# Patient Record
Sex: FEMALE | Race: WHITE | NOT HISPANIC OR LATINO | Employment: FULL TIME | URBAN - METROPOLITAN AREA
[De-identification: names, ages, dates, MRNs, and addresses within clinical notes are randomized per-mention and may not be internally consistent; named-entity substitution may affect disease eponyms.]

---

## 2017-11-30 ENCOUNTER — OFFICE VISIT (OUTPATIENT)
Dept: OBSTETRICS AND GYNECOLOGY | Facility: CLINIC | Age: 27
End: 2017-11-30

## 2017-11-30 VITALS
HEIGHT: 67 IN | BODY MASS INDEX: 35.31 KG/M2 | WEIGHT: 225 LBS | DIASTOLIC BLOOD PRESSURE: 98 MMHG | HEART RATE: 91 BPM | SYSTOLIC BLOOD PRESSURE: 142 MMHG

## 2017-11-30 DIAGNOSIS — Z01.419 WELL WOMAN EXAM WITH ROUTINE GYNECOLOGICAL EXAM: Primary | ICD-10-CM

## 2017-11-30 PROCEDURE — 99395 PREV VISIT EST AGE 18-39: CPT | Performed by: OBSTETRICS & GYNECOLOGY

## 2017-11-30 RX ORDER — MULTIVIT WITH MINERALS/LUTEIN
1000 TABLET ORAL DAILY
COMMUNITY

## 2017-11-30 RX ORDER — VALSARTAN 160 MG/1
TABLET ORAL
Refills: 1 | COMMUNITY
Start: 2017-09-14 | End: 2018-03-29

## 2017-11-30 RX ORDER — ERGOCALCIFEROL 1.25 MG/1
CAPSULE ORAL
Refills: 0 | COMMUNITY
Start: 2017-09-14 | End: 2018-05-31 | Stop reason: SDUPTHER

## 2017-11-30 RX ORDER — LANOLIN ALCOHOL/MO/W.PET/CERES
1000 CREAM (GRAM) TOPICAL DAILY
COMMUNITY

## 2017-11-30 NOTE — PROGRESS NOTES
Subjective   Callie Parks is a 27 y.o. female here for annual exam. Last Pap - 2014  History of Present Illness  Patient has Nexplanon and left upper extremity that was placed 3 years ago in February 2018.  She is ready to start pursuing fertility.  She has been treated for hypertension by her primary care provider.  Currently she is on valsartan.  She is aware that this is not ideal for her pregnancy.  We discussed that she would need to change her hypertensive therapy and get it under good control prior to pursuing fertility.  She sees her primary care provider in mid December.    Denies a history of abnormal Pap smears.  She is sexually active with her .  She previously lived in [place] and moved back to [place] recently where her family lives.  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  History reviewed. No pertinent past medical history.  History reviewed. No pertinent surgical history.  Social History   Substance Use Topics   • Smoking status: Never Smoker   • Smokeless tobacco: Never Used   • Alcohol use No     History reviewed. No pertinent family history.      Review of Systems   Constitutional: Negative for chills, fever and unexpected weight change.   HENT: Negative for congestion, nosebleeds and sore throat.    Eyes: Negative for visual disturbance.   Respiratory: Negative for cough, chest tightness and shortness of breath.    Cardiovascular: Negative for chest pain and palpitations.   Gastrointestinal: Negative for abdominal pain, constipation, diarrhea, nausea and vomiting.   Endocrine: Negative for polyuria.   Genitourinary: Negative for difficulty urinating, dyspareunia, dysuria, frequency, genital sores, hematuria, menstrual problem, pelvic pain, urgency, vaginal bleeding, vaginal discharge and vaginal pain.   Musculoskeletal: Negative for arthralgias and joint swelling.   Skin: Negative for color change and rash.   Neurological: Negative for dizziness, light-headedness and  "headaches.   Hematological: Does not bruise/bleed easily.   Psychiatric/Behavioral: Negative for dysphoric mood. The patient is not nervous/anxious.        Objective    /98  Pulse 91  Ht 67\" (170.2 cm)  Wt 225 lb (102 kg)  BMI 35.24 kg/m2   Physical Exam   Constitutional: She is oriented to person, place, and time. She appears well-developed and well-nourished. No distress.   HENT:   Head: Normocephalic and atraumatic.   Neck: No tracheal deviation present. No thyromegaly present.   Cardiovascular: Normal rate, regular rhythm and normal heart sounds.  Exam reveals no gallop and no friction rub.    No murmur heard.  Pulmonary/Chest: Effort normal and breath sounds normal. No respiratory distress. She has no wheezes. She has no rales. She exhibits no tenderness. Right breast exhibits no inverted nipple, no mass, no nipple discharge, no skin change and no tenderness. Left breast exhibits no inverted nipple, no mass, no nipple discharge, no skin change and no tenderness.   Abdominal: Soft. She exhibits no distension and no mass. There is no tenderness.   Genitourinary: Uterus normal. No labial fusion. There is no rash, lesion or injury on the right labia. There is no rash, lesion or injury on the left labia. Uterus is not deviated, not enlarged, not fixed and not tender. Cervix exhibits no motion tenderness, no discharge and no friability. Right adnexum displays no mass, no tenderness and no fullness. Left adnexum displays no mass, no tenderness and no fullness. No tenderness or bleeding in the vagina. No vaginal discharge found.   Musculoskeletal: Normal range of motion. She exhibits no edema or deformity.   Lymphadenopathy:     She has no cervical adenopathy.   Neurological: She is alert and oriented to person, place, and time.   Skin: Skin is warm and dry. No rash noted. She is not diaphoretic.   nexplanon palpable in left upper extremity   Psychiatric: She has a normal mood and affect. Her behavior is " normal. Judgment and thought content normal.         Assessment/Plan   Problems Addressed this Visit     None      Visit Diagnoses     Well woman exam with routine gynecological exam    -  Primary          Well woman counseling/screening:    Cervical cancer screening:    Reports no h/o cervical dysplasia   HPV vaccination recommended.   The patient is due for a pap today .    Screening guidelines discussed with patient  Breast cancer screening:    Discussed risks and benefits of earlier versus later screening   Clinical breast exam recommended for age 20-39 years every 1-3 years   Mammogram recommended starting age 40   Breast self awareness encouraged  STD Screening   Declined  Contraception :   Nexplanon in place, due out in Feb 2018   Patient was advised to change BP medication and ensure adequate control prior to getting Nexplanon removed. She is agreeable to this and plans to meet with PCP in mid December.  Acceptable blood pressure medications in pregnancy include Procardia XL and labetalol.  Patient was recommended to start taking a prenatal vitamin.  She was counseled on the removal procedure, risks and benefits.  We also discussed that in pregnancy some hypertension does improve and some patients may come off medication but she is at higher risk of preeclampsia and other pregnancy complications given hypertension and weight  Family history    does not demonstrate need for genetics referral   Healthy lifestyle counseling:   Patient was counseled on BMI of 35.  We discussed the risks including the risk to her fertility and her pregnancy.  I recommended that she start trying to lose weight.  When she does become pregnant if her BMI is over 30 we would recommend minimal to no weight gain healthy balanced diet.

## 2017-12-01 ENCOUNTER — DOCUMENTATION (OUTPATIENT)
Dept: OBSTETRICS AND GYNECOLOGY | Facility: CLINIC | Age: 27
End: 2017-12-01

## 2017-12-01 ENCOUNTER — TELEPHONE (OUTPATIENT)
Dept: OBSTETRICS AND GYNECOLOGY | Facility: CLINIC | Age: 27
End: 2017-12-01

## 2017-12-06 ENCOUNTER — TELEPHONE (OUTPATIENT)
Dept: OBSTETRICS AND GYNECOLOGY | Facility: CLINIC | Age: 27
End: 2017-12-06

## 2017-12-06 LAB
CONV .: NORMAL
CYTOLOGIST CVX/VAG CYTO: NORMAL
CYTOLOGY CVX/VAG DOC THIN PREP: NORMAL
DX ICD CODE: NORMAL
HIV 1 & 2 AB SER-IMP: NORMAL
OTHER STN SPEC: NORMAL
PATH REPORT.FINAL DX SPEC: NORMAL
STAT OF ADQ CVX/VAG CYTO-IMP: NORMAL

## 2017-12-06 NOTE — TELEPHONE ENCOUNTER
----- Message from Felicia Boswell MD sent at 12/6/2017  1:20 PM EST -----  Please notify patient of normal pap smear. Thanks!

## 2018-03-29 ENCOUNTER — PROCEDURE VISIT (OUTPATIENT)
Dept: OBSTETRICS AND GYNECOLOGY | Facility: CLINIC | Age: 28
End: 2018-03-29

## 2018-03-29 VITALS
HEART RATE: 70 BPM | WEIGHT: 216 LBS | HEIGHT: 67 IN | BODY MASS INDEX: 33.9 KG/M2 | SYSTOLIC BLOOD PRESSURE: 135 MMHG | DIASTOLIC BLOOD PRESSURE: 99 MMHG

## 2018-03-29 DIAGNOSIS — Z30.09 ENCOUNTER FOR OTHER GENERAL COUNSELING OR ADVICE ON CONTRACEPTION: Primary | ICD-10-CM

## 2018-03-29 PROCEDURE — 11982 REMOVE DRUG IMPLANT DEVICE: CPT | Performed by: OBSTETRICS & GYNECOLOGY

## 2018-03-29 RX ORDER — PRENATAL VIT NO.126/IRON/FOLIC 28MG-0.8MG
1 TABLET ORAL DAILY
COMMUNITY
End: 2018-12-19 | Stop reason: HOSPADM

## 2018-03-29 RX ORDER — NIFEDIPINE 60 MG/1
60 TABLET, FILM COATED, EXTENDED RELEASE ORAL DAILY
Refills: 5 | COMMUNITY
Start: 2018-03-22 | End: 2018-10-12 | Stop reason: SDUPTHER

## 2018-03-29 NOTE — PROGRESS NOTES
Procedure Note     Pre-Operative Diagnosis: 1. Desire for removal of Nexplanon device from left arm    Post-Operative Diagnosis: Same     Procedure: Nexplanon removal    Anesthetic: 3mL 1% plain Xylocaine     Estimated Blood Loss: Nil     Complications: no complications were noted     Counseling: Patient was seen and counseled.  Patient desires removal of the implant device because she desires conception.  She was counseled on other contraceptive options.  She was counseled on prenatal vitamin use if no contraceptive option chosen and healthy timing/spacing of pregnancy. She expressed understanding. She has already switched to an antihypertensive that is safe in pregnancy. The risks of removal were reviewed including bleeding, infection, damage to surrounding structures, and scarring.    Description of Procedure:   Patient was consented for procedure; questions were answered. The implant was identified in her left upper extremity . The area of the distal implant was cleaned with alcohol and injected with 3mL of 1% lidocaine.  The skin was prepped with betadine and incised approximately 0.5cm parallel to the plane of the implant.  The implant was identified and grasped with a hemostat.  The overlying capsule was dissected off with a hemostat, and the implant was removed in its entirety.  A single interrupted stitch of 4-0 monocryl was placed in the skin.  The incision was covered with Steri strips.  Patient tolerated the procedure well.     Felicia Boswell MD

## 2018-05-07 ENCOUNTER — INITIAL PRENATAL (OUTPATIENT)
Dept: OBSTETRICS AND GYNECOLOGY | Facility: CLINIC | Age: 28
End: 2018-05-07

## 2018-05-07 VITALS — DIASTOLIC BLOOD PRESSURE: 97 MMHG | BODY MASS INDEX: 33.2 KG/M2 | WEIGHT: 212 LBS | SYSTOLIC BLOOD PRESSURE: 141 MMHG

## 2018-05-07 DIAGNOSIS — Z34.01 ENCOUNTER FOR SUPERVISION OF NORMAL FIRST PREGNANCY IN FIRST TRIMESTER: Primary | ICD-10-CM

## 2018-05-07 PROBLEM — O10.919 CHRONIC HYPERTENSION AFFECTING PREGNANCY: Status: ACTIVE | Noted: 2018-05-07

## 2018-05-07 PROBLEM — E03.8 SUBCLINICAL HYPOTHYROIDISM: Status: ACTIVE | Noted: 2018-05-07

## 2018-05-07 PROBLEM — O09.90 HIGH-RISK PREGNANCY: Status: ACTIVE | Noted: 2018-05-07

## 2018-05-07 PROCEDURE — 0501F PRENATAL FLOW SHEET: CPT | Performed by: OBSTETRICS & GYNECOLOGY

## 2018-05-07 NOTE — PATIENT INSTRUCTIONS
"Nausea/vomiting in pregnancy:  To help with nausea and vomiting you may try the following over the counter products:  Nola chews, nola tea, nola gum  Mint tea, peppermint gum or candy  B6/Doxylamine: to be taken daily, not just when symptoms occur  Pyridoxine (also known as B6): 10 to 25 mg orally every six to eight hours; the maximum treatment dose suggested for pregnant women is 200 mg/day.  Doxylamine (available in some over-the-counter sleeping pills (eg, Unisom Sleep Tabs) and as an antihistamine chewable tablet (Aldex AN)). One-half of the 25 mg over-the-counter tablet or two chewable 5 mg tablets can be used off-label as an antiemetic  · The Association of Professors of Gynecology and Obstetrics has released a smart phone application that can help you monitor and manage your symptoms.  The Application is \"Managing NVP,\" and has a green icon that says \"APGO WELLMOM.\"    If these do not work, we may need to try prescription medications.    Please contact us if you are unable to tolerate liquids (even sips of water) for over six to eight hours, have weight loss of over 10% of your body weight, blood in vomit, fever (temp of 100.4 or higher), or other concerning symptoms arise.    Travel During Pregnancy:  • Always use seatbelts.  A lap belt should be worn below the abdomen (across the hips) and the shoulder belt should be worn across the center of your chest (between the breasts) away from your neck.  Do not put the shoulder belt under your arm or behind your back.  Pull any slack out of the belt.  • Air travel is safe in most uncomplicated pregnancies, but we do not recommend air travel past 36 weeks.  Airlines may also have restrictions, so check with your airline before flying.  For some international flights, the travel cut off may be as early as 28 weeks gestation, and some airlines may require letters from your physician.  • When going on long trips in car, plane, train, or bus, frequent ambulation " is important to prevent blood clots in legs and/or lungs.  The following may help with prevention of blood clots in legs: Drink lots of fluid, wear loose-fitting clothing, walk and stretch at regular intervals.    • Avoid areas with Zika outbreaks.  For the latest information, you may visit: www.cdc.gov/travel/notices/.  Resources: , , Committee Opinion 455  Nutrition during pregnancy  • Average weight gain during pregnancy is based on your pre-pregnancy body mass index (BMI).  See below for recommended weight gain:  o Underweight (BMI <18.5), we recommend 28 to 40lb weight gain  o Normal weight (BMI 18.5 to 24.9), we recommend a 25 to 35lb weight gain  o Overweight (BMI 25-29.9), we recommend a 15 to 25lb weight gain  o Obese (BMI >30), we recommend keeping weight gain under 20 lbs.    • You should speak with your physician regarding your specific weight goals for this pregnancy.   • Foods to avoid include:   o Fish: avoid certain types of fish such as shark, swordfish, amrita mackerel, tilefish.  Limit white (albacore) tuna to 6 oz per week.  Choose fish and shellfish such as shrimp, salmon, catfish, [place].  o Food-borne illness: Pregnant women are much more likely to get Listeriosis than non-pregnant women.  To help prevent this, avoid eating unpasteurized milk and unpasteurized milk products, hot dogs/lunch meat/cold cuts unless they are heated until steaming right before serving, refrigerated meat spreads, refrigerated smoked seafood, raw or undercooked seafood/eggs/meat.   • Vitamin D helps development of the baby’s bones and teeth.  Good sources of Vitamin D include milk fortified with Vitamin D and fatty fish such as salmon.    • Folic acid, also known as folate, helps develop the baby’s brain and spine.  You should make sure your vitamin contains extra folic acid - at least 400mcg.    • Iron helps make red blood cells.  You need to make extra red blood cell sin pregnancy.  We recommend eating  Iron-rich foods such as lean red meat, poultry, fish, dried beans and peas, iron-fortified cereals, and prune juice.  You may be recommended an iron supplement.  If so, it is absorbed more easily if taken with vitamin C-rich foods such as citrus fruits or tomatoes.    • It is important to eat a well balanced diet.  A good recourse for nutrition recommendations is: www.choosemyplate.gov.  • Limit caffeine intake to 200mg daily.  Some coffees/teas/sodas have very different levels of caffeine per serving, so check the nutrition labeling.   Resources: , Committee Opinion 548  Exercise during pregnancy  • If you are healthy and your pregnancy is normal, it is safe to continue or start most types of exercise.   Physical activity does not increase your risk of miscarriage, low birth weight or early delivery, but you should discuss specific limitations or any complications with your physician.    • Benefits of exercise during pregnancy include: reduced back pain, less constipation, promotes overall health and healthy weight gain which may decrease risks for certain pregnancy complications such as diabetes and/or preeclampsia.  • The CDC recommends 150 minutes of moderate intensity aerobic exercise per week.  Moderate intensity means that you are moving enough to raise your heart rate and start sweating, but you can talk normally.  Brisk walking, swimming/water workouts, modified yoga/pilates, and use of elliptical machines and/or stationary bikes are examples of aerobic activity.    • Precautions:  o Stay hydrated.  Drink plenty of water before, during and after your workout  o Wear supportive clothing such as a sports bra  o Do not become overheated.  Do not exercise outside when it is very hot or humid  o Avoid lying flat on your back as much as possible  o AVOID contact sports, skydiving, sports that risk falling (such as skiing, surfing, off-road cycling, gymnastics, horseback riding), hot yoga/hot pilates, scuba  diving  • Stop exercising if you experience: bleeding from the vagina, feeling dizzy/faint, shortness of breath before starting exercise, chest pain, headache, muscle weakness, calf pain or swelling, regular uterine contractions, fluid leaking from the vagina.    • Postpartum exercise: Continuing exercise after you deliver your baby will help boost your energy, strengthen muscles, promote better sleep, and relieve stress.  It also may be useful in preventing postpartum depression.  150 minutes of moderate-intensity aerobic activity is recommended.  Types of exercise and when you can start a regular exercise routine may be limited by the type of delivery you had.  Please discuss with your physician prior to resuming or starting exercise.    Resources: ,   Back pain during pregnancy  • Back pain is very common during pregnancy.  It may arise due to strain on your back muscles, weakness of the abdominal muscles, and pregnancy hormones.    • To prevent back pain:  o Wear shoes with good support. Flat shoes and high heels may not have good arch support.  o Consider a firm mattress  o Use good lifting practices.  Do not bend from the waist to pick things up.  Squat down and bend your knees, keeping a straight back.  o Sit in chairs with good back support or use a small pillow behind your lower back.    o Sleep on your side with one or two pillows between your legs  • To ease back pain:   o Exercise can help stretch strained muscles and strengthen weak muscles to promote good posture  • Contact your health care provider with severe pain, pain that persists for more than 2 weeks, fever, burning during urination, or vaginal bleeding.  Resources:

## 2018-05-07 NOTE — PROGRESS NOTES
Initial OB Visit    CC- Missed Menses    Callie Parks is being seen today for her first obstetrical visit.  She is a 27 y.o.    5w4d gestation.   FOB: Rigo Parks  This is a planned pregnancy.   Denies spotting, bleeding, cramping  #: 1, Date: None, Sex: None, Weight: None, GA: None, Delivery: None, Apgar1: None, Apgar5: None, Living: None, Birth Comments: None      Current obstetric complaints: mild nausea, no vomiting   Prior obstetric issues, potential pregnancy concerns: None  Family history of genetic issues (includes FOB):  FOB's brother had gastroschisis and passed away 2-3 months after birth.    Prior infections concerning in pregnancy (Rash, fever since LMP): none  Varicella Hx - reports vaccination   Prior testing for Cystic Fibrosis Carrier or Sickle Cell Trait- none  History of abnormal pap smears: none  History of STIs: no  Prepregnancy BMI - Body mass index is 33.2 kg/m².    Past Medical History:   Diagnosis Date   • Hypertension        Past Surgical History:   Procedure Laterality Date   • ADENOIDECTOMY     • WISDOM TOOTH EXTRACTION         Current Outpatient Prescriptions:   •  NIFEdipine CC (ADALAT CC) 60 MG 24 hr tablet, Take 60 mg by mouth Daily., Disp: , Rfl: 5  •  Prenatal Vit-Fe Fumarate-FA (PRENATAL, CLASSIC, VITAMIN) 28-0.8 MG tablet tablet, Take 1 tablet by mouth Daily., Disp: , Rfl:   •  vitamin B-12 (CYANOCOBALAMIN) 1000 MCG tablet, Take 1,000 mcg by mouth Daily., Disp: , Rfl:   •  vitamin D (ERGOCALCIFEROL) 19090 units capsule capsule, TK ONE C PO  Q 7 DAYS UNTIL GONE, Disp: , Rfl: 0  •  vitamin E 1000 UNIT capsule, Take 1,000 Units by mouth Daily., Disp: , Rfl:     No Known Allergies    Social History     Social History   • Marital status:      Spouse name: N/A   • Number of children: N/A   • Years of education: N/A     Occupational History   • Not on file.     Social History Main Topics   • Smoking status: Never Smoker   • Smokeless tobacco: Never Used   • Alcohol  use No   • Drug use: No   • Sexual activity: Yes     Partners: Male     Birth control/ protection: None     Other Topics Concern   • Not on file     Social History Narrative   • No narrative on file       Family History   Problem Relation Age of Onset   • Breast cancer Neg Hx    • Ovarian cancer Neg Hx    • Uterine cancer Neg Hx    • Colon cancer Neg Hx        Review of systems     Constitutional: negative for chills, fevers and for fatigue  Eyes: negative  Ears, nose, mouth, throat, and face: negative for hearing loss and nasal congestion  Respiratory: negative for asthma and wheezing  Cardiovascular: negative for chest pain and dyspnea  Gastrointestinal: negative for dyspepsia, dysphagia abdominal pain  Genitourinary:negative for urinary incontinence  Integument/breast: negative for breast lump  Hematologic/lymphatic: negative for bleeding  Musculoskeletal:negative for aches  Neurological: negative for numbness/tingling  Behavioral/Psych: negative for anhedonia  Allergic/Immunologic: negative for rash, allergy    Objective    /97   Wt 96.2 kg (212 lb)   LMP 03/29/2018 (Exact Date)   BMI 33.20 kg/m²       General Appearance:    Alert, cooperative, in no acute distress, habitus obese   Head:    Normocephalic, without obvious abnormality, atraumatic   Eyes:            Lids and lashes normal, conjunctivae and sclerae normal, no   icterus, no pallor, corneas clear   Ears:    Ears appear intact with no abnormalities noted       Neck:   No adenopathy, supple, trachea midline, no thyromegaly   Back:     No kyphosis present, no scoliosis present,                       Lungs:     Clear to auscultation,respirations regular, even and                   unlabored    Heart:    Regular rhythm and normal rate, normal S1 and S2, no            murmur, no gallop, no rub, no click   Breast Exam:    No masses, No nipple discharge   Abdomen:     Normal bowel sounds, no masses, no organomegaly, soft        non-tender,  "non-distended, no guarding, no rebound                 tenderness   Genitalia:    Vulva - BUS-WNL, NEFG    Vagina - No discharge, No bleeding    Cervix - No Lesions, closed     Uterus - Consistent with 5 weeks    Adnexa - No bill, NT    Pelvimetry - clinically adequate, gynecoid pelvis     Extremities:   Moves all extremities well, no edema, no cyanosis, no              redness   Pulses:   Pulses palpable and equal bilaterally   Skin:   No bleeding, bruising or rash   Lymph nodes:   No palpable adenopathy   Neurologic:   Sensation intact, A&O times 3      Assessment  Pregnancy at 5w4d   Chronic hypertension, on medication   \"Borderline\" hypothyroid, no medication  Obesity     Plan    Initial labs drawn, GC/CHL screen done  Patient is on Prenatal vitamins  Problem list reviewed and updated.  Reviewed routine prenatal care with the office to include but not limited to:   Zika (travel restrictions/ok to use insect repellant), not to changing cat litter, food restrictions, avoidance of alcohol, tobacco and drugs and saunas/hot tubs, anticipated weight gain/nutrition requirements.  Reviewed nature of practice and hospital.  Reviewed recommended follow up, importance of compliance with care. We reviewed testing in pregnancy including HIV testing and urine drug screen.    Reviewed aneuploidy screening and CF screening - declines at this time. Was given [employer] pamphlet and discussed limitations of testing.  She is on Keto diet which has uncertain safety in pregnancy. We reviewed recommendation for 15lb weight gain in pregnancy given BMI.  Reviewed healthy, balanced diet.  On Procardia, discussed use of ASA for preeclampsia prevention.  Will review further at next visit. Baseline labs today.   Counseled on limitations of ultrasound in pregnancy.  All questions answered.       Patient Active Problem List    Diagnosis Date Noted   • High-risk pregnancy 05/07/2018     Note Last Updated: 5/7/2018     Dated by LMP  [ ] first " trim labs [ ] 1 hour GCT  [x] Pap NIL Dec 2017  [ ] GC/CT  [x] flu [ ] tdap  Aneuploidy discussed, **  CF discussed,  **  AFP discussed, **  Breastfeeding encouraged         • Chronic hypertension affecting pregnancy 05/07/2018     Note Last Updated: 5/7/2018     On procardia  Baseline labs  ASA?     • Subclinical hypothyroidism 05/07/2018     Note Last Updated: 5/7/2018     Per PCP  TSH at first visit         Felicia Boswell MD

## 2018-05-08 ENCOUNTER — TELEPHONE (OUTPATIENT)
Dept: OBSTETRICS AND GYNECOLOGY | Facility: CLINIC | Age: 28
End: 2018-05-08

## 2018-05-08 PROBLEM — R74.01 TRANSAMINITIS: Status: ACTIVE | Noted: 2018-05-08

## 2018-05-08 LAB
ABO GROUP BLD: (no result)
ALBUMIN SERPL-MCNC: 4.7 G/DL (ref 3.5–5.5)
ALBUMIN/GLOB SERPL: 2 {RATIO} (ref 1.2–2.2)
ALP SERPL-CCNC: 77 IU/L (ref 39–117)
ALT SERPL-CCNC: 81 IU/L (ref 0–32)
AST SERPL-CCNC: 45 IU/L (ref 0–40)
BASOPHILS # BLD AUTO: 0 X10E3/UL (ref 0–0.2)
BASOPHILS NFR BLD AUTO: 0 %
BILIRUB SERPL-MCNC: 0.3 MG/DL (ref 0–1.2)
BLD GP AB SCN SERPL QL: NEGATIVE
BUN SERPL-MCNC: 11 MG/DL (ref 6–20)
BUN/CREAT SERPL: 18 (ref 9–23)
CALCIUM SERPL-MCNC: 9.5 MG/DL (ref 8.7–10.2)
CHLORIDE SERPL-SCNC: 101 MMOL/L (ref 96–106)
CO2 SERPL-SCNC: 21 MMOL/L (ref 18–29)
CREAT SERPL-MCNC: 0.61 MG/DL (ref 0.57–1)
CREAT UR-MCNC: 102.7 MG/DL
EOSINOPHIL # BLD AUTO: 0 X10E3/UL (ref 0–0.4)
EOSINOPHIL NFR BLD AUTO: 0 %
ERYTHROCYTE [DISTWIDTH] IN BLOOD BY AUTOMATED COUNT: 13.8 % (ref 12.3–15.4)
GFR SERPLBLD CREATININE-BSD FMLA CKD-EPI: 126 ML/MIN/1.73
GFR SERPLBLD CREATININE-BSD FMLA CKD-EPI: 146 ML/MIN/1.73
GLOBULIN SER CALC-MCNC: 2.3 G/DL (ref 1.5–4.5)
GLUCOSE SERPL-MCNC: 90 MG/DL (ref 65–99)
HBA1C MFR BLD: 5.3 % (ref 4.8–5.6)
HBV SURFACE AG SERPL QL IA: NEGATIVE
HCT VFR BLD AUTO: 41.8 % (ref 34–46.6)
HGB BLD-MCNC: 14.2 G/DL (ref 11.1–15.9)
HIV 1+2 AB+HIV1 P24 AG SERPL QL IA: NON REACTIVE
IMM GRANULOCYTES # BLD: 0 X10E3/UL (ref 0–0.1)
IMM GRANULOCYTES NFR BLD: 0 %
LYMPHOCYTES # BLD AUTO: 2.4 X10E3/UL (ref 0.7–3.1)
LYMPHOCYTES NFR BLD AUTO: 26 %
MCH RBC QN AUTO: 30.5 PG (ref 26.6–33)
MCHC RBC AUTO-ENTMCNC: 34 G/DL (ref 31.5–35.7)
MCV RBC AUTO: 90 FL (ref 79–97)
MONOCYTES # BLD AUTO: 0.5 X10E3/UL (ref 0.1–0.9)
MONOCYTES NFR BLD AUTO: 6 %
NEUTROPHILS # BLD AUTO: 6.3 X10E3/UL (ref 1.4–7)
NEUTROPHILS NFR BLD AUTO: 68 %
PLATELET # BLD AUTO: 244 X10E3/UL (ref 150–379)
POTASSIUM SERPL-SCNC: 4.2 MMOL/L (ref 3.5–5.2)
PROT SERPL-MCNC: 7 G/DL (ref 6–8.5)
PROT UR-MCNC: 6 MG/DL
PROT/CREAT UR: 58.4 MG/G CREA (ref 0–200)
RBC # BLD AUTO: 4.65 X10E6/UL (ref 3.77–5.28)
RH BLD: POSITIVE
RPR SER QL: NON REACTIVE
RUBV IGG SERPL IA-ACNC: <0.9 INDEX
SODIUM SERPL-SCNC: 140 MMOL/L (ref 134–144)
TSH SERPL DL<=0.005 MIU/L-ACNC: 2.35 UIU/ML (ref 0.45–4.5)
WBC # BLD AUTO: 9.3 X10E3/UL (ref 3.4–10.8)

## 2018-05-08 NOTE — TELEPHONE ENCOUNTER
Called and spoke with patient regarding labs.  She reports elevated transaminases in the past that then improved.  She thinks the elevation was around August 2017 and had an ultrasound that showed ALVARADO.  Normal in the beginning of November.  Informed of Rubella non immune.  Plan repeat CMP in one month.

## 2018-05-09 LAB
AMPHETAMINES UR QL SCN: NEGATIVE NG/ML
BARBITURATES UR QL SCN: NEGATIVE NG/ML
BENZODIAZ UR QL: NEGATIVE NG/ML
BZE UR QL: NEGATIVE NG/ML
CANNABINOIDS UR QL SCN: NEGATIVE NG/ML
METHADONE UR QL SCN: NEGATIVE NG/ML
OPIATES UR QL: NEGATIVE NG/ML
PCP UR QL: NEGATIVE NG/ML
PROPOXYPH UR QL SCN: NEGATIVE NG/ML

## 2018-05-10 LAB
BACTERIA UR CULT: NORMAL
BACTERIA UR CULT: NORMAL
C TRACH RRNA SPEC QL NAA+PROBE: NEGATIVE
N GONORRHOEA RRNA SPEC QL NAA+PROBE: NEGATIVE
T VAGINALIS RRNA SPEC QL NAA+PROBE: NEGATIVE

## 2018-05-31 ENCOUNTER — ROUTINE PRENATAL (OUTPATIENT)
Dept: OBSTETRICS AND GYNECOLOGY | Facility: CLINIC | Age: 28
End: 2018-05-31

## 2018-05-31 ENCOUNTER — PROCEDURE VISIT (OUTPATIENT)
Dept: OBSTETRICS AND GYNECOLOGY | Facility: CLINIC | Age: 28
End: 2018-05-31

## 2018-05-31 VITALS — WEIGHT: 213 LBS | BODY MASS INDEX: 33.35 KG/M2 | SYSTOLIC BLOOD PRESSURE: 131 MMHG | DIASTOLIC BLOOD PRESSURE: 94 MMHG

## 2018-05-31 DIAGNOSIS — O09.91 HIGH-RISK PREGNANCY IN FIRST TRIMESTER: ICD-10-CM

## 2018-05-31 DIAGNOSIS — Z34.91 PREGNANCY WITH UNCERTAIN DATES IN FIRST TRIMESTER: Primary | ICD-10-CM

## 2018-05-31 DIAGNOSIS — R74.01 TRANSAMINITIS: Primary | ICD-10-CM

## 2018-05-31 PROCEDURE — 0502F SUBSEQUENT PRENATAL CARE: CPT | Performed by: OBSTETRICS & GYNECOLOGY

## 2018-05-31 PROCEDURE — 76817 TRANSVAGINAL US OBSTETRIC: CPT | Performed by: OBSTETRICS & GYNECOLOGY

## 2018-05-31 RX ORDER — MULTIVIT-MIN/IRON/FOLIC ACID/K 18-600-40
CAPSULE ORAL DAILY
COMMUNITY
End: 2021-05-26

## 2018-05-31 NOTE — PATIENT INSTRUCTIONS
"ICD 10 code: O09.90 Supervision of high risk pregnancy    Nausea/vomiting in pregnancy:  To help with nausea and vomiting you may try the following over the counter products:  Nola chews, nola tea, nola gum  Mint tea, peppermint gum or candy  B6/Doxylamine: to be taken daily, not just when symptoms occur  Pyridoxine (also known as B6): 10 to 25 mg orally every six to eight hours; the maximum treatment dose suggested for pregnant women is 200 mg/day.  Doxylamine (available in some over-the-counter sleeping pills (eg, Unisom Sleep Tabs) and as an antihistamine chewable tablet (Aldex AN)). One-half of the 25 mg over-the-counter tablet or two chewable 5 mg tablets can be used off-label as an antiemetic  · The Association of Professors of Gynecology and Obstetrics has released a smart phone application that can help you monitor and manage your symptoms.  The Application is \"Managing NVP,\" and has a green icon that says \"APGO WELLMOM.\"    If these do not work, we may need to try prescription medications.    Please contact us if you are unable to tolerate liquids (even sips of water) for over six to eight hours, have weight loss of over 10% of your body weight, blood in vomit, fever (temp of 100.4 or higher), or other concerning symptoms arise.            "

## 2018-05-31 NOTE — PROGRESS NOTES
Chief Complaint   Patient presents with   • Routine Prenatal Visit      Callie Parks is a 27 y.o.  at 9w0d   Reports no issues. Has had some nausea and one episode of emesis yesterday  Denies vaginal bleeding, cramping  /94   Wt 96.6 kg (213 lb)   LMP 2018 (Exact Date)   BMI 33.35 kg/m²    Abd: gravid, nontender  See OB Flowsheet  ASSESSMENT:   IUP at 9w0d   Transaminitis  Chronic hypertension  Nausea without vomiting  PLAN:  See updated Problem List   Reviewed dating US.  Sure of LMP, will keep due date at 1/3/18  Transaminitis: patient was on keto diet which she thinks may have flaired  Will repeat at next visit  Desires aneuploidy, will check with insurance about cell free DNA vs. Quad and if desires CF  Continue antihypertensive, start ASA 81mg; 24 hour urine protein at 14 weeks  B6/doxyalmine for nausea/vomiting  First trimester bleeding/pain precautions reviewed.  Return in about 4 weeks (around 2018).      Patient Active Problem List    Diagnosis Date Noted   • Transaminitis 2018     Note Last Updated: 2018     AST/ALT: 45/81 at new OB     • High-risk pregnancy 2018     Note Last Updated: 2018     Dated by LMP  [ ] first trim labs [ ] 1 hour GCT  [x] Pap NIL Dec 2017  [ ] GC/CT  [x] flu [ ] tdap  Aneuploidy discussed, **  CF discussed,  **  AFP discussed, **  Breastfeeding encouraged         • Chronic hypertension affecting pregnancy 2018     Note Last Updated: 2018     On procardia  Baseline labs  ASA?     • Subclinical hypothyroidism 2018     Note Last Updated: 2018     Per PCP  TSH at first visit         Orders Placed This Encounter   Procedures   • Comprehensive Metabolic Panel

## 2018-06-29 ENCOUNTER — ROUTINE PRENATAL (OUTPATIENT)
Dept: OBSTETRICS AND GYNECOLOGY | Facility: CLINIC | Age: 28
End: 2018-06-29

## 2018-06-29 VITALS — WEIGHT: 213 LBS | SYSTOLIC BLOOD PRESSURE: 133 MMHG | BODY MASS INDEX: 33.35 KG/M2 | DIASTOLIC BLOOD PRESSURE: 90 MMHG

## 2018-06-29 DIAGNOSIS — O10.919 CHRONIC HYPERTENSION AFFECTING PREGNANCY: ICD-10-CM

## 2018-06-29 DIAGNOSIS — O09.91 HIGH-RISK PREGNANCY IN FIRST TRIMESTER: Primary | ICD-10-CM

## 2018-06-29 PROCEDURE — 0502F SUBSEQUENT PRENATAL CARE: CPT | Performed by: OBSTETRICS & GYNECOLOGY

## 2018-06-29 RX ORDER — ASPIRIN 81 MG/1
81 TABLET, CHEWABLE ORAL DAILY
COMMUNITY
End: 2018-12-19 | Stop reason: HOSPADM

## 2018-06-29 NOTE — PROGRESS NOTES
Chief Complaint   Patient presents with   • Routine Prenatal Visit      Callie Parks is a 27 y.o.  at 13w1d   Reports some heartburn and reflux which she thinks is contributing to her nausea/vomiting  Denies vaginal bleeding, cramping  /90   Wt 96.6 kg (213 lb)   LMP 2018 (Exact Date)   BMI 33.35 kg/m²    Abd: gravid, nontender  See OB Flowsheet  ASSESSMENT:   IUP at 13w1d   Chronic hypertension  Transaminitis  PLAN:  See updated Problem List   Reviewed aneuploidy testing - she declines any further testing unless indicated by US or other risk factors  Repeat CMP today.  24 hour urine protein to be collected.  First trimester bleeding/pain precautions reviewed.  Return in about 4 weeks (around 2018).      Patient Active Problem List    Diagnosis Date Noted   • Transaminitis 2018     Note Last Updated: 2018     AST/ALT: 45/81 at new OB     • High-risk pregnancy 2018     Note Last Updated: 2018     Dated by LMP=9w US  [x] first trim labs [ ] 1 hour GCT  [x] Pap NIL Dec 2017  [x] GC/CT  [x] flu [ ] tdap  Aneuploidy discussed, declines  CF discussed,  declines  AFP discussed, declines  Breastfeeding encouraged         • Chronic hypertension affecting pregnancy 2018     Note Last Updated: 2018     On procardia  Baseline labs  On ASA  Repeat CMP and 24 hour urine in first trimester     • Subclinical hypothyroidism 2018     Note Last Updated: 2018     Per PCP  TSH at first visit         Orders Placed This Encounter   Procedures   • Creatinine, Urine, 24 Hour - Urine, Clean Catch   • Protein, Urine, 24 Hour - Urine, Clean Catch

## 2018-06-30 LAB
ALBUMIN SERPL-MCNC: 4.6 G/DL (ref 3.5–5.2)
ALBUMIN/GLOB SERPL: 1.8 G/DL
ALP SERPL-CCNC: 67 U/L (ref 39–117)
ALT SERPL-CCNC: 61 U/L (ref 1–33)
AST SERPL-CCNC: 28 U/L (ref 1–32)
BILIRUB SERPL-MCNC: 0.4 MG/DL (ref 0.1–1.2)
BUN SERPL-MCNC: 10 MG/DL (ref 6–20)
BUN/CREAT SERPL: 19.6 (ref 7–25)
CALCIUM SERPL-MCNC: 9.4 MG/DL (ref 8.6–10.5)
CHLORIDE SERPL-SCNC: 100 MMOL/L (ref 98–107)
CO2 SERPL-SCNC: 22.9 MMOL/L (ref 22–29)
CREAT SERPL-MCNC: 0.51 MG/DL (ref 0.57–1)
GLOBULIN SER CALC-MCNC: 2.6 GM/DL
GLUCOSE SERPL-MCNC: 91 MG/DL (ref 65–99)
POTASSIUM SERPL-SCNC: 4.1 MMOL/L (ref 3.5–5.2)
PROT SERPL-MCNC: 7.2 G/DL (ref 6–8.5)
SODIUM SERPL-SCNC: 138 MMOL/L (ref 136–145)

## 2018-07-13 ENCOUNTER — TELEPHONE (OUTPATIENT)
Dept: OBSTETRICS AND GYNECOLOGY | Facility: CLINIC | Age: 28
End: 2018-07-13

## 2018-07-13 NOTE — TELEPHONE ENCOUNTER
Pt informed her ALT is still slightly elevated and that will continue to watch this as it is only mildly elevated and lower from before

## 2018-07-27 ENCOUNTER — ROUTINE PRENATAL (OUTPATIENT)
Dept: OBSTETRICS AND GYNECOLOGY | Facility: CLINIC | Age: 28
End: 2018-07-27

## 2018-07-27 VITALS — WEIGHT: 212 LBS | DIASTOLIC BLOOD PRESSURE: 95 MMHG | SYSTOLIC BLOOD PRESSURE: 130 MMHG | BODY MASS INDEX: 33.2 KG/M2

## 2018-07-27 DIAGNOSIS — O09.92 HIGH-RISK PREGNANCY IN SECOND TRIMESTER: Primary | ICD-10-CM

## 2018-07-27 PROCEDURE — 0502F SUBSEQUENT PRENATAL CARE: CPT | Performed by: OBSTETRICS & GYNECOLOGY

## 2018-07-27 NOTE — PROGRESS NOTES
Chief Complaint   Patient presents with   • Routine Prenatal Visit      Callie Parks is a 27 y.o.  at 17w1d   Reports no issues  Turned in 24 hour urine today  Denies vaginal bleeding, cramping, contractions, LOF  Has felt fetal movements.   /95   Wt 96.2 kg (212 lb)   LMP 2018 (Exact Date)   BMI 33.20 kg/m²    Abd: gravid, nontender  See OB Flowsheet  ASSESSMENT:   IUP at 17w1d   Chronic hypertension, on medication  PLAN:  Problem list updated  Continue Procardia and ASA.  24 hour urine pending  Second trimester precautions reviewed including  labor precautions, anticipated fetal movements  Rh +, GCT at 24 to 28 weeks  Return in about 3 weeks (around 2018) for OB ultrasound, 4 weeks for OB visit.    Patient Active Problem List    Diagnosis Date Noted   • Transaminitis 2018     Note Last Updated: 2018     AST/ALT: 45/81 at new OB     • High-risk pregnancy 2018     Note Last Updated: 2018     Dated by LMP=9w US  [x] first trim labs [ ] 1 hour GCT  [x] Pap NIL Dec 2017  [x] GC/CT  [x] flu [ ] tdap  Aneuploidy discussed, declines  CF discussed,  declines  AFP discussed, declines  Breastfeeding encouraged         • Chronic hypertension affecting pregnancy 2018     Note Last Updated: 2018     On procardia  Baseline labs  On ASA  Repeat CMP and 24 hour urine in first trimester     • Subclinical hypothyroidism 2018     Note Last Updated: 2018     Per PCP  TSH at first visit         No orders of the defined types were placed in this encounter.

## 2018-07-28 LAB
PROT 24H UR-MRATE: 163 MG/24 HR (ref 30–150)
PROT UR-MCNC: 6.5 MG/DL

## 2018-08-23 ENCOUNTER — PROCEDURE VISIT (OUTPATIENT)
Dept: OBSTETRICS AND GYNECOLOGY | Facility: CLINIC | Age: 28
End: 2018-08-23

## 2018-08-23 ENCOUNTER — ROUTINE PRENATAL (OUTPATIENT)
Dept: OBSTETRICS AND GYNECOLOGY | Facility: CLINIC | Age: 28
End: 2018-08-23

## 2018-08-23 VITALS — BODY MASS INDEX: 34.14 KG/M2 | SYSTOLIC BLOOD PRESSURE: 136 MMHG | WEIGHT: 218 LBS | DIASTOLIC BLOOD PRESSURE: 97 MMHG

## 2018-08-23 DIAGNOSIS — Z36.89 ENCOUNTER FOR FETAL ANATOMIC SURVEY: Primary | ICD-10-CM

## 2018-08-23 DIAGNOSIS — O09.92 SUPERVISION OF HIGH RISK PREGNANCY IN SECOND TRIMESTER: Primary | ICD-10-CM

## 2018-08-23 PROCEDURE — 76805 OB US >/= 14 WKS SNGL FETUS: CPT | Performed by: OBSTETRICS & GYNECOLOGY

## 2018-08-23 PROCEDURE — 0502F SUBSEQUENT PRENATAL CARE: CPT | Performed by: OBSTETRICS & GYNECOLOGY

## 2018-08-23 NOTE — PROGRESS NOTES
Chief Complaint   Patient presents with   • Routine Prenatal Visit      Callie Parks is a 27 y.o.  at 21w0d   Reports no issues.  Went to Collect in [place] and had some lower extremity edema during the trip.  This is subsequently resolved with rest and foot elevation.  Denies vaginal bleeding, cramping, contractions, LOF  Has felt fetal movements.   Denies headache/vision changes.  Reports BP at home mostly normal range.  Continued on Procardia and Aspirin  /97   Wt 98.9 kg (218 lb)   LMP 2018 (Exact Date)   BMI 34.14 kg/m²    Abd: gravid, nontender  See OB Flowsheet  ASSESSMENT:   IUP at 21w0d   Chronic hypertension on medication  PLAN:  Problem list updated  Reviewed normal anatomy US, breech.  Reviewed limitations of ultrasound in detecting aneuploidy.  Reviewed signs/symptoms of preeclampsia; baseline 24 hour urine 163.  Plan for ANFS at 32 weeks and serial growth US starting at 24 to 28 weeks  Second trimester precautions reviewed including  labor precautions, anticipated fetal movements  Rh +, GCT next visit  Return in about 4 weeks (around 2018).    Patient Active Problem List    Diagnosis Date Noted   • Transaminitis 2018     Note Last Updated: 2018     AST/ALT: 45/81 at new OB     • High-risk pregnancy 2018     Note Last Updated: 2018     Dated by LMP=9w US  [x] first trim labs [ ] 1 hour GCT  [x] Pap NIL Dec 2017  [x] GC/CT  [x] flu [ ] tdap  Aneuploidy discussed, declines  CF discussed,  declines  AFP discussed, declines  Breastfeeding encouraged         • Chronic hypertension affecting pregnancy 2018     Note Last Updated: 2018     On procardia  Baseline labs  On ASA  Repeat CMP and 24 hour urine in first trimester     • Subclinical hypothyroidism 2018     Note Last Updated: 2018     Per PCP  TSH at first visit         No orders of the defined types were placed in this encounter.

## 2018-08-31 ENCOUNTER — TELEPHONE (OUTPATIENT)
Dept: OBSTETRICS AND GYNECOLOGY | Facility: CLINIC | Age: 28
End: 2018-08-31

## 2018-08-31 RX ORDER — VALACYCLOVIR HYDROCHLORIDE 1 G/1
TABLET, FILM COATED ORAL
Qty: 20 TABLET | Refills: 0 | Status: SHIPPED | OUTPATIENT
Start: 2018-08-31 | End: 2018-09-13

## 2018-09-13 ENCOUNTER — ROUTINE PRENATAL (OUTPATIENT)
Dept: OBSTETRICS AND GYNECOLOGY | Facility: CLINIC | Age: 28
End: 2018-09-13

## 2018-09-13 VITALS — BODY MASS INDEX: 34.45 KG/M2 | WEIGHT: 220 LBS | DIASTOLIC BLOOD PRESSURE: 97 MMHG | SYSTOLIC BLOOD PRESSURE: 137 MMHG

## 2018-09-13 DIAGNOSIS — O09.92 HIGH-RISK PREGNANCY IN SECOND TRIMESTER: Primary | ICD-10-CM

## 2018-09-13 LAB
ERYTHROCYTE [DISTWIDTH] IN BLOOD BY AUTOMATED COUNT: 15 % (ref 11.7–13)
HCT VFR BLD AUTO: 37.3 % (ref 35.6–45.5)
HGB BLD-MCNC: 12.1 G/DL (ref 11.9–15.5)
MCH RBC QN AUTO: 30.5 PG (ref 26.9–32)
MCHC RBC AUTO-ENTMCNC: 32.4 G/DL (ref 32.4–36.3)
MCV RBC AUTO: 94 FL (ref 80.5–98.2)
PLATELET # BLD AUTO: 212 10*3/MM3 (ref 140–500)
RBC # BLD AUTO: 3.97 10*6/MM3 (ref 3.9–5.2)
WBC # BLD AUTO: 12.87 10*3/MM3 (ref 4.5–10.7)

## 2018-09-13 PROCEDURE — 0502F SUBSEQUENT PRENATAL CARE: CPT | Performed by: OBSTETRICS & GYNECOLOGY

## 2018-09-13 NOTE — PROGRESS NOTES
Chief Complaint   Patient presents with   • Routine Prenatal Visit      Callie Parks is a 27 y.o.  at 24w0d   Reports no issues   Denies vaginal bleeding, cramping, contractions, LOF  Has felt fetal movements.   /97   Wt 99.8 kg (220 lb)   LMP 2018 (Exact Date)   BMI 34.45 kg/m²    Abd: gravid, nontender  See OB Flowsheet  ASSESSMENT:   IUP at 24w0d   Chronic hypertension, on medication  PLAN:  Problem list updated  Plan for growth US next visit.   Continue antihypertensive medication and ASA.  Follow up with symptoms  Second trimester precautions reviewed including  labor precautions, anticipated fetal movements  Rh +, GCT today  FU 4 weeks for OB US and OB visit    Patient Active Problem List    Diagnosis Date Noted   • Transaminitis 2018     Note Last Updated: 2018     AST/ALT: 45/81 at new OB     • High-risk pregnancy 2018     Note Last Updated: 2018     Dated by LMP=9w US  [x] first trim labs [ ] 1 hour GCT  [x] Pap NIL Dec 2017  [x] GC/CT  [x] flu [ ] tdap  Aneuploidy discussed, declines  CF discussed,  declines  AFP discussed, declines  Breastfeeding encouraged         • Chronic hypertension affecting pregnancy 2018     Note Last Updated: 2018     On procardia  Baseline labs  On ASA  Repeat CMP and 24 hour urine in first trimester     • Subclinical hypothyroidism 2018     Note Last Updated: 2018     Per PCP  TSH at first visit         No orders of the defined types were placed in this encounter.

## 2018-09-14 ENCOUNTER — TELEPHONE (OUTPATIENT)
Dept: OBSTETRICS AND GYNECOLOGY | Facility: CLINIC | Age: 28
End: 2018-09-14

## 2018-09-14 DIAGNOSIS — R73.09 ABNORMAL GLUCOSE: Primary | ICD-10-CM

## 2018-09-14 PROBLEM — O99.810 ABNORMAL GLUCOSE AFFECTING PREGNANCY: Status: ACTIVE | Noted: 2018-09-14

## 2018-09-14 LAB — GLUCOSE 1H P 50 G GLC PO SERPL-MCNC: 143 MG/DL (ref 65–139)

## 2018-09-14 NOTE — TELEPHONE ENCOUNTER
Please call patient and let her know that her one hour glucose testing (diabetes screen) was elevated.  She needs to complete a three hour glucose screening test as soon as possible.  For this test, she should come in fasting (nothing to eat or drink) for at least 8 hours.  We recommend doing it early in the morning.  The test will take three hours as we need to test a blood sugar each hour.  Thanks!

## 2018-09-17 NOTE — TELEPHONE ENCOUNTER
09/17/18  Called patient to inform results and instructions on the 3 hr glucose, no answer. Left a message to return call.

## 2018-09-27 ENCOUNTER — TELEPHONE (OUTPATIENT)
Dept: OBSTETRICS AND GYNECOLOGY | Facility: CLINIC | Age: 28
End: 2018-09-27

## 2018-09-27 LAB
GLUCOSE 1H P 100 G GLC PO SERPL-MCNC: 149 MG/DL (ref 40–300)
GLUCOSE 2H P 100 G GLC PO SERPL-MCNC: 145 MG/DL (ref 40–300)
GLUCOSE 3H P 100 G GLC PO SERPL-MCNC: 105 MG/DL (ref 40–300)
GLUCOSE P FAST SERPL-MCNC: 76 MG/DL

## 2018-10-12 ENCOUNTER — ROUTINE PRENATAL (OUTPATIENT)
Dept: OBSTETRICS AND GYNECOLOGY | Facility: CLINIC | Age: 28
End: 2018-10-12

## 2018-10-12 ENCOUNTER — PROCEDURE VISIT (OUTPATIENT)
Dept: OBSTETRICS AND GYNECOLOGY | Facility: CLINIC | Age: 28
End: 2018-10-12

## 2018-10-12 VITALS — BODY MASS INDEX: 34.76 KG/M2 | SYSTOLIC BLOOD PRESSURE: 131 MMHG | DIASTOLIC BLOOD PRESSURE: 96 MMHG | WEIGHT: 222 LBS

## 2018-10-12 DIAGNOSIS — E03.8 SUBCLINICAL HYPOTHYROIDISM: ICD-10-CM

## 2018-10-12 DIAGNOSIS — Z36.89 ENCOUNTER FOR ULTRASOUND TO CHECK FETAL GROWTH: Primary | ICD-10-CM

## 2018-10-12 DIAGNOSIS — O09.93 HIGH-RISK PREGNANCY IN THIRD TRIMESTER: ICD-10-CM

## 2018-10-12 DIAGNOSIS — O99.210 OBESITY DURING PREGNANCY: ICD-10-CM

## 2018-10-12 DIAGNOSIS — I15.9 SECONDARY HYPERTENSION: Primary | ICD-10-CM

## 2018-10-12 DIAGNOSIS — O10.919 CHRONIC HYPERTENSION AFFECTING PREGNANCY: ICD-10-CM

## 2018-10-12 DIAGNOSIS — O10.919 HTN IN PREGNANCY, CHRONIC: ICD-10-CM

## 2018-10-12 LAB
ALBUMIN SERPL-MCNC: 4.2 G/DL (ref 3.5–5.2)
ALBUMIN/GLOB SERPL: 1.6 G/DL
ALP SERPL-CCNC: 111 U/L (ref 39–117)
ALT SERPL-CCNC: 48 U/L (ref 1–33)
AST SERPL-CCNC: 36 U/L (ref 1–32)
BILIRUB SERPL-MCNC: 0.2 MG/DL (ref 0.1–1.2)
BUN SERPL-MCNC: 6 MG/DL (ref 6–20)
BUN/CREAT SERPL: 13 (ref 7–25)
CALCIUM SERPL-MCNC: 9.4 MG/DL (ref 8.6–10.5)
CHLORIDE SERPL-SCNC: 104 MMOL/L (ref 98–107)
CO2 SERPL-SCNC: 21.3 MMOL/L (ref 22–29)
CREAT SERPL-MCNC: 0.46 MG/DL (ref 0.57–1)
CREAT UR-MCNC: 81.5 MG/DL
ERYTHROCYTE [DISTWIDTH] IN BLOOD BY AUTOMATED COUNT: 14.5 % (ref 11.7–13)
GLOBULIN SER CALC-MCNC: 2.7 GM/DL
GLUCOSE SERPL-MCNC: 89 MG/DL (ref 65–99)
HCT VFR BLD AUTO: 39 % (ref 35.6–45.5)
HGB BLD-MCNC: 13.1 G/DL (ref 11.9–15.5)
MCH RBC QN AUTO: 31.2 PG (ref 26.9–32)
MCHC RBC AUTO-ENTMCNC: 33.6 G/DL (ref 32.4–36.3)
MCV RBC AUTO: 92.9 FL (ref 80.5–98.2)
PLATELET # BLD AUTO: 215 10*3/MM3 (ref 140–500)
POTASSIUM SERPL-SCNC: 4.2 MMOL/L (ref 3.5–5.2)
PROT SERPL-MCNC: 6.9 G/DL (ref 6–8.5)
PROT UR-MCNC: 12 MG/DL
PROT/CREAT UR: 147.2 MG/G CREA (ref 0–200)
RBC # BLD AUTO: 4.2 10*6/MM3 (ref 3.9–5.2)
SODIUM SERPL-SCNC: 139 MMOL/L (ref 136–145)
WBC # BLD AUTO: 15.36 10*3/MM3 (ref 4.5–10.7)

## 2018-10-12 PROCEDURE — 0502F SUBSEQUENT PRENATAL CARE: CPT | Performed by: OBSTETRICS & GYNECOLOGY

## 2018-10-12 PROCEDURE — 76816 OB US FOLLOW-UP PER FETUS: CPT | Performed by: OBSTETRICS & GYNECOLOGY

## 2018-10-12 RX ORDER — NIFEDIPINE 60 MG/1
60 TABLET, FILM COATED, EXTENDED RELEASE ORAL DAILY
Qty: 28 TABLET | Refills: 5 | Status: SHIPPED | OUTPATIENT
Start: 2018-10-12 | End: 2018-11-24 | Stop reason: HOSPADM

## 2018-10-12 NOTE — PROGRESS NOTES
Chief Complaint   Patient presents with   • Routine Prenatal Visit      Callie Parks is a 28 y.o.  at 28w1d   Reports some RUQ pain that started last week. Attributes to diet and has had in the past. She was on vacation and ate poorly.  /96   Wt 101 kg (222 lb)   LMP 2018 (Exact Date)   BMI 34.76 kg/m²    Abd: gravid, nontender, no RUQ pain to palpation  See OB Flowsheet  ASSESSMENT:   IUP at 28w1d   Chronic hypertension  RUQ pain  PLAN:  Reviewed preeclamspia precautions, labs will be obtained.  If no improvement in one week repeat US  Continue to monitor BP at home. Growth US reviewed today and appropriate but breech.  Declined Tdap as she has URI symptoms today.  Will get flu at work. Tdap next visit  third trimester precautions reviewed.  Return in about 2 weeks (around 10/26/2018).

## 2018-10-26 ENCOUNTER — ROUTINE PRENATAL (OUTPATIENT)
Dept: OBSTETRICS AND GYNECOLOGY | Facility: CLINIC | Age: 28
End: 2018-10-26

## 2018-10-26 VITALS — BODY MASS INDEX: 35.7 KG/M2 | SYSTOLIC BLOOD PRESSURE: 147 MMHG | WEIGHT: 228 LBS | DIASTOLIC BLOOD PRESSURE: 103 MMHG

## 2018-10-26 DIAGNOSIS — O09.93 HIGH-RISK PREGNANCY IN THIRD TRIMESTER: Primary | ICD-10-CM

## 2018-10-26 LAB
CREAT UR-MCNC: 67.6 MG/DL
ERYTHROCYTE [DISTWIDTH] IN BLOOD BY AUTOMATED COUNT: 14.2 % (ref 11.7–13)
HCT VFR BLD AUTO: 40.2 % (ref 35.6–45.5)
HGB BLD-MCNC: 12.7 G/DL (ref 11.9–15.5)
MCH RBC QN AUTO: 29.8 PG (ref 26.9–32)
MCHC RBC AUTO-ENTMCNC: 31.6 G/DL (ref 32.4–36.3)
MCV RBC AUTO: 94.4 FL (ref 80.5–98.2)
PLATELET # BLD AUTO: 209 10*3/MM3 (ref 140–500)
PROT UR-MCNC: 13 MG/DL
PROT/CREAT UR: 192.3 MG/G CREA (ref 0–200)
RBC # BLD AUTO: 4.26 10*6/MM3 (ref 3.9–5.2)
WBC # BLD AUTO: 11.69 10*3/MM3 (ref 4.5–10.7)

## 2018-10-26 PROCEDURE — 90471 IMMUNIZATION ADMIN: CPT | Performed by: OBSTETRICS & GYNECOLOGY

## 2018-10-26 PROCEDURE — 0502F SUBSEQUENT PRENATAL CARE: CPT | Performed by: OBSTETRICS & GYNECOLOGY

## 2018-10-26 PROCEDURE — 90715 TDAP VACCINE 7 YRS/> IM: CPT | Performed by: OBSTETRICS & GYNECOLOGY

## 2018-10-26 NOTE — PROGRESS NOTES
Chief Complaint   Patient presents with   • Routine Prenatal Visit      Callie Parks is a 28 y.o.  at 30w1d   Denies headache, vision changes, right upper quadrant pain.  She reports that her blood pressures at home have been in the normal to low mild range. Notes normal fetal movements    BP (!) 147/103   Wt 103 kg (228 lb)   LMP 2018 (Exact Date)   BMI 35.70 kg/m²    Abd: gravid, nontender  See OB Flowsheet    ASSESSMENT:   1. IUP at 30w1d   2. Chronic hypertension  PLAN:  Preeclampsia precautions reviewed.  BP slightly worse this visit however, she reports normal and frequent monitoring at home.  We will repeat labs today.  We have reviewed the symptoms of severe preeclampsia and indications for sooner follow-up.  She received the flu shot at work.  Agrees to TDAP today.  We discussed delivery at 37 weeks for chronic hypertension on medication if stable.    Return in about 2 weeks (around 2018) for Growth US, BPP, OB visit.  Orders Placed This Encounter   Procedures   • Tdap Vaccine Greater Than or Equal To 6yo IM   • CBC (No Diff)   • Comprehensive Metabolic Panel   • Protein / Creatinine Ratio, Urine - Urine, Clean Catch

## 2018-10-27 LAB
ALBUMIN SERPL-MCNC: 3.8 G/DL (ref 3.5–5.2)
ALBUMIN/GLOB SERPL: 1.4 G/DL
ALP SERPL-CCNC: 118 U/L (ref 39–117)
ALT SERPL-CCNC: 44 U/L (ref 1–33)
AST SERPL-CCNC: 36 U/L (ref 1–32)
BILIRUB SERPL-MCNC: 0.2 MG/DL (ref 0.1–1.2)
BUN SERPL-MCNC: 6 MG/DL (ref 6–20)
BUN/CREAT SERPL: 12.8 (ref 7–25)
CALCIUM SERPL-MCNC: 9.4 MG/DL (ref 8.6–10.5)
CHLORIDE SERPL-SCNC: 105 MMOL/L (ref 98–107)
CO2 SERPL-SCNC: 21.7 MMOL/L (ref 22–29)
CREAT SERPL-MCNC: 0.47 MG/DL (ref 0.57–1)
GLOBULIN SER CALC-MCNC: 2.7 GM/DL
GLUCOSE SERPL-MCNC: 76 MG/DL (ref 65–99)
POTASSIUM SERPL-SCNC: 3.9 MMOL/L (ref 3.5–5.2)
PROT SERPL-MCNC: 6.5 G/DL (ref 6–8.5)
SODIUM SERPL-SCNC: 140 MMOL/L (ref 136–145)
T4 FREE SERPL-MCNC: 0.87 NG/DL (ref 0.93–1.7)
TSH SERPL DL<=0.005 MIU/L-ACNC: 5.03 MIU/ML (ref 0.27–4.2)

## 2018-10-29 ENCOUNTER — TELEPHONE (OUTPATIENT)
Dept: OBSTETRICS AND GYNECOLOGY | Facility: CLINIC | Age: 28
End: 2018-10-29

## 2018-10-29 RX ORDER — LEVOTHYROXINE SODIUM 0.05 MG/1
50 TABLET ORAL DAILY
Qty: 30 TABLET | Refills: 1 | Status: SHIPPED | OUTPATIENT
Start: 2018-10-29 | End: 2021-01-13 | Stop reason: SDUPTHER

## 2018-11-08 ENCOUNTER — ROUTINE PRENATAL (OUTPATIENT)
Dept: OBSTETRICS AND GYNECOLOGY | Facility: CLINIC | Age: 28
End: 2018-11-08

## 2018-11-08 ENCOUNTER — PROCEDURE VISIT (OUTPATIENT)
Dept: OBSTETRICS AND GYNECOLOGY | Facility: CLINIC | Age: 28
End: 2018-11-08

## 2018-11-08 VITALS — BODY MASS INDEX: 35.7 KG/M2 | SYSTOLIC BLOOD PRESSURE: 146 MMHG | WEIGHT: 228 LBS | DIASTOLIC BLOOD PRESSURE: 104 MMHG

## 2018-11-08 DIAGNOSIS — O10.919 HTN IN PREGNANCY, CHRONIC: ICD-10-CM

## 2018-11-08 DIAGNOSIS — O99.210 OBESITY DURING PREGNANCY: Primary | ICD-10-CM

## 2018-11-08 DIAGNOSIS — O09.93 HIGH-RISK PREGNANCY IN THIRD TRIMESTER: Primary | ICD-10-CM

## 2018-11-08 DIAGNOSIS — O10.919 CHRONIC HYPERTENSION AFFECTING PREGNANCY: ICD-10-CM

## 2018-11-08 PROCEDURE — 76816 OB US FOLLOW-UP PER FETUS: CPT | Performed by: OBSTETRICS & GYNECOLOGY

## 2018-11-08 PROCEDURE — 76819 FETAL BIOPHYS PROFIL W/O NST: CPT | Performed by: OBSTETRICS & GYNECOLOGY

## 2018-11-08 PROCEDURE — 0502F SUBSEQUENT PRENATAL CARE: CPT | Performed by: OBSTETRICS & GYNECOLOGY

## 2018-11-08 NOTE — PROGRESS NOTES
Chief Complaint   Patient presents with   • Routine Prenatal Visit      Callie Parks is a 28 y.o.  at 32w0d   Reports no issues. Has been taking BP at home and only 1 out >15 was 150's systolic/100s diastolic with repeat 7 minutes later that was normal.  Mostly normal to low mild BP.  Denies headache/vision changes/RUQ pain.  Notes normal fetal movements    BP (!) 146/104   Wt 103 kg (228 lb)   LMP 2018 (Exact Date)   BMI 35.70 kg/m²    Abd: gravid, nontender  See OB Flowsheet    ASSESSMENT:   1. IUP at 32w0d   2. Chronic hypertension on medication  3. Hypothyroidism  PLAN:  Will  synthroid (unable to leave VM previously as VM full).  Reviewed indications for follow up/BP medication change/preeclampsia signs/symptoms.  At this time home BP monitoring is mostly normal to low mild and so no indication to increase medication.  Will monitor closely with weekly visits and weekly BPP  Growth US reviewed - vertex, EFW 1729g.  BPP weekly and monthly growth US  Repeat TSH in 4 weeks  IOL scheduled for 39 weeks on  with cervical ripening likely.     Return in about 1 week (around 11/15/2018).  No orders of the defined types were placed in this encounter.

## 2018-11-16 ENCOUNTER — ROUTINE PRENATAL (OUTPATIENT)
Dept: OBSTETRICS AND GYNECOLOGY | Facility: CLINIC | Age: 28
End: 2018-11-16

## 2018-11-16 ENCOUNTER — PROCEDURE VISIT (OUTPATIENT)
Dept: OBSTETRICS AND GYNECOLOGY | Facility: CLINIC | Age: 28
End: 2018-11-16

## 2018-11-16 VITALS — BODY MASS INDEX: 36.48 KG/M2 | SYSTOLIC BLOOD PRESSURE: 150 MMHG | DIASTOLIC BLOOD PRESSURE: 106 MMHG | WEIGHT: 233 LBS

## 2018-11-16 DIAGNOSIS — O99.210 OBESITY DURING PREGNANCY: Primary | ICD-10-CM

## 2018-11-16 DIAGNOSIS — O10.919 HTN IN PREGNANCY, CHRONIC: ICD-10-CM

## 2018-11-16 DIAGNOSIS — I10 CHRONIC HYPERTENSION: Primary | ICD-10-CM

## 2018-11-16 DIAGNOSIS — O10.919 CHRONIC HYPERTENSION AFFECTING PREGNANCY: ICD-10-CM

## 2018-11-16 PROCEDURE — 0502F SUBSEQUENT PRENATAL CARE: CPT | Performed by: OBSTETRICS & GYNECOLOGY

## 2018-11-16 PROCEDURE — 76819 FETAL BIOPHYS PROFIL W/O NST: CPT | Performed by: OBSTETRICS & GYNECOLOGY

## 2018-11-16 NOTE — PROGRESS NOTES
Chief Complaint   Patient presents with   • Routine Prenatal Visit      Callie Parks is a 28 y.o.  at 33w1d   Patient has list of BP at home.  Highest is one 150/100 which was before she took her blood pressure medication that morning. Denies any headache/vision changes/RUQ pain.  Most BP are in normal to low 140's over 90s range.  No severe BP at home.  Taking Procardia 60XL qAM    BP (!) 150/106   Wt 106 kg (233 lb)   LMP 2018 (Exact Date)   BMI 36.48 kg/m²    Abd: gravid, nontender  See OB Flowsheet    ASSESSMENT:   1. IUP at 33w1d   Chronic hypertension, on medication  ANFS  Hypothyroid    PLAN:  Reviewed preeclampsia precautions. Patient will repeat labs today due to isolated severe.    We reviewed increasing medication which based on BP in office I would consider, but her BP at home have been normal to low mild range.  Given this, I recommended continuing to pattern and call if BP elevate.  Pt was encouraged to follow up next week. Has limitations due to work schedule.  She will get BPP and I will check in on her by phone next week.  If severe range blood pressure at home, headache/vision changes/RUQ pain arise, or if labs abnormal, recommend evaluation in L&D.  Patient expressed understanding  BPP , MAIK nl.   Reviewed common symptoms of the third trimester.  Counseled on labor precautions and anticipated fetal movements.  Reviewed kick counts.  Patient is aware of the location of L&D.        No Follow-up on file.  Orders Placed This Encounter   Procedures   • Comprehensive Metabolic Panel   • CBC (No Diff)   • Protein / Creatinine Ratio, Urine - Urine, Clean Catch

## 2018-11-17 LAB
ALBUMIN SERPL-MCNC: 3.4 G/DL (ref 3.5–5.2)
ALBUMIN/GLOB SERPL: 1.3 G/DL
ALP SERPL-CCNC: 140 U/L (ref 39–117)
ALT SERPL-CCNC: 37 U/L (ref 1–33)
AST SERPL-CCNC: 32 U/L (ref 1–32)
BILIRUB SERPL-MCNC: 0.2 MG/DL (ref 0.1–1.2)
BUN SERPL-MCNC: 6 MG/DL (ref 6–20)
BUN/CREAT SERPL: 14.3 (ref 7–25)
CALCIUM SERPL-MCNC: 9.2 MG/DL (ref 8.6–10.5)
CHLORIDE SERPL-SCNC: 106 MMOL/L (ref 98–107)
CO2 SERPL-SCNC: 21.7 MMOL/L (ref 22–29)
CREAT SERPL-MCNC: 0.42 MG/DL (ref 0.57–1)
CREAT UR-MCNC: 37.4 MG/DL
ERYTHROCYTE [DISTWIDTH] IN BLOOD BY AUTOMATED COUNT: 13.7 % (ref 11.7–13)
GLOBULIN SER CALC-MCNC: 2.7 GM/DL
GLUCOSE SERPL-MCNC: 80 MG/DL (ref 65–99)
HCT VFR BLD AUTO: 39.5 % (ref 35.6–45.5)
HGB BLD-MCNC: 12.9 G/DL (ref 11.9–15.5)
MCH RBC QN AUTO: 30.5 PG (ref 26.9–32)
MCHC RBC AUTO-ENTMCNC: 32.7 G/DL (ref 32.4–36.3)
MCV RBC AUTO: 93.4 FL (ref 80.5–98.2)
PLATELET # BLD AUTO: 168 10*3/MM3 (ref 140–500)
POTASSIUM SERPL-SCNC: 3.9 MMOL/L (ref 3.5–5.2)
PROT SERPL-MCNC: 6.1 G/DL (ref 6–8.5)
PROT UR-MCNC: 8 MG/DL
PROT/CREAT UR: 213.9 MG/G CREA (ref 0–200)
RBC # BLD AUTO: 4.23 10*6/MM3 (ref 3.9–5.2)
SODIUM SERPL-SCNC: 140 MMOL/L (ref 136–145)
WBC # BLD AUTO: 10.39 10*3/MM3 (ref 4.5–10.7)

## 2018-11-21 ENCOUNTER — PROCEDURE VISIT (OUTPATIENT)
Dept: OBSTETRICS AND GYNECOLOGY | Facility: CLINIC | Age: 28
End: 2018-11-21

## 2018-11-21 ENCOUNTER — HOSPITAL ENCOUNTER (INPATIENT)
Facility: HOSPITAL | Age: 28
LOS: 3 days | Discharge: HOME | End: 2018-11-24
Attending: OBSTETRICS & GYNECOLOGY | Admitting: OBSTETRICS & GYNECOLOGY

## 2018-11-21 ENCOUNTER — ROUTINE PRENATAL (OUTPATIENT)
Dept: OBSTETRICS AND GYNECOLOGY | Facility: CLINIC | Age: 28
End: 2018-11-21

## 2018-11-21 VITALS — SYSTOLIC BLOOD PRESSURE: 162 MMHG | BODY MASS INDEX: 37.42 KG/M2 | DIASTOLIC BLOOD PRESSURE: 110 MMHG | WEIGHT: 239 LBS

## 2018-11-21 DIAGNOSIS — O10.919 HTN IN PREGNANCY, CHRONIC: ICD-10-CM

## 2018-11-21 DIAGNOSIS — O10.919 CHRONIC HYPERTENSION AFFECTING PREGNANCY: ICD-10-CM

## 2018-11-21 DIAGNOSIS — E03.9 HYPOTHYROIDISM AFFECTING PREGNANCY IN THIRD TRIMESTER: ICD-10-CM

## 2018-11-21 DIAGNOSIS — O99.210 OBESITY DURING PREGNANCY: ICD-10-CM

## 2018-11-21 DIAGNOSIS — Z34.03 PRIMIGRAVIDA IN THIRD TRIMESTER: Primary | ICD-10-CM

## 2018-11-21 DIAGNOSIS — O99.283 HYPOTHYROIDISM AFFECTING PREGNANCY IN THIRD TRIMESTER: ICD-10-CM

## 2018-11-21 DIAGNOSIS — O99.210 OBESITY DURING PREGNANCY: Primary | ICD-10-CM

## 2018-11-21 PROBLEM — O16.9 HYPERTENSION AFFECTING PREGNANCY: Status: ACTIVE | Noted: 2018-11-21

## 2018-11-21 LAB
ABO GROUP BLD: NORMAL
ALBUMIN SERPL-MCNC: 3.2 G/DL (ref 3.5–5.2)
ALBUMIN/GLOB SERPL: 1.1 G/DL
ALP SERPL-CCNC: 137 U/L (ref 39–117)
ALT SERPL W P-5'-P-CCNC: 35 U/L (ref 1–33)
ANION GAP SERPL CALCULATED.3IONS-SCNC: 12.7 MMOL/L
AST SERPL-CCNC: 32 U/L (ref 1–32)
BACTERIA UR QL AUTO: NORMAL /HPF
BASOPHILS # BLD AUTO: 0.03 10*3/MM3 (ref 0–0.2)
BASOPHILS NFR BLD AUTO: 0.3 % (ref 0–1.5)
BILIRUB SERPL-MCNC: 0.2 MG/DL (ref 0.1–1.2)
BILIRUB UR QL STRIP: NEGATIVE
BLD GP AB SCN SERPL QL: NEGATIVE
BUN BLD-MCNC: 7 MG/DL (ref 6–20)
BUN/CREAT SERPL: 16.3 (ref 7–25)
CALCIUM SPEC-SCNC: 9.1 MG/DL (ref 8.6–10.5)
CHLORIDE SERPL-SCNC: 106 MMOL/L (ref 98–107)
CLARITY UR: CLEAR
CO2 SERPL-SCNC: 20.3 MMOL/L (ref 22–29)
COLOR UR: YELLOW
CREAT BLD-MCNC: 0.43 MG/DL (ref 0.57–1)
DEPRECATED RDW RBC AUTO: 44.6 FL (ref 37–54)
EOSINOPHIL # BLD AUTO: 0.05 10*3/MM3 (ref 0–0.7)
EOSINOPHIL NFR BLD AUTO: 0.5 % (ref 0.3–6.2)
ERYTHROCYTE [DISTWIDTH] IN BLOOD BY AUTOMATED COUNT: 13.7 % (ref 11.7–13)
GFR SERPL CREATININE-BSD FRML MDRD: >150 ML/MIN/1.73
GLOBULIN UR ELPH-MCNC: 2.8 GM/DL
GLUCOSE BLD-MCNC: 85 MG/DL (ref 65–99)
GLUCOSE UR STRIP-MCNC: NEGATIVE MG/DL
HCT VFR BLD AUTO: 36.8 % (ref 35.6–45.5)
HGB BLD-MCNC: 12.8 G/DL (ref 11.9–15.5)
HGB UR QL STRIP.AUTO: NEGATIVE
HYALINE CASTS UR QL AUTO: NORMAL /LPF
IMM GRANULOCYTES # BLD: 0.03 10*3/MM3 (ref 0–0.03)
IMM GRANULOCYTES NFR BLD: 0.3 % (ref 0–0.5)
KETONES UR QL STRIP: NEGATIVE
LEUKOCYTE ESTERASE UR QL STRIP.AUTO: NEGATIVE
LYMPHOCYTES # BLD AUTO: 2.72 10*3/MM3 (ref 0.9–4.8)
LYMPHOCYTES NFR BLD AUTO: 28.2 % (ref 19.6–45.3)
MCH RBC QN AUTO: 31.2 PG (ref 26.9–32)
MCHC RBC AUTO-ENTMCNC: 34.8 G/DL (ref 32.4–36.3)
MCV RBC AUTO: 89.8 FL (ref 80.5–98.2)
MONOCYTES # BLD AUTO: 0.89 10*3/MM3 (ref 0.2–1.2)
MONOCYTES NFR BLD AUTO: 9.2 % (ref 5–12)
NEUTROPHILS # BLD AUTO: 5.97 10*3/MM3 (ref 1.9–8.1)
NEUTROPHILS NFR BLD AUTO: 61.8 % (ref 42.7–76)
NITRITE UR QL STRIP: NEGATIVE
PH UR STRIP.AUTO: 6.5 [PH] (ref 5–8)
PLATELET # BLD AUTO: 163 10*3/MM3 (ref 140–500)
PMV BLD AUTO: 13.1 FL (ref 6–12)
POTASSIUM BLD-SCNC: 3.7 MMOL/L (ref 3.5–5.2)
PROT SERPL-MCNC: 6 G/DL (ref 6–8.5)
PROT UR QL STRIP: ABNORMAL
RBC # BLD AUTO: 4.1 10*6/MM3 (ref 3.9–5.2)
RBC # UR: NORMAL /HPF
REF LAB TEST METHOD: NORMAL
RH BLD: POSITIVE
SODIUM BLD-SCNC: 139 MMOL/L (ref 136–145)
SP GR UR STRIP: 1.01 (ref 1–1.03)
SQUAMOUS #/AREA URNS HPF: NORMAL /HPF
T&S EXPIRATION DATE: NORMAL
UROBILINOGEN UR QL STRIP: ABNORMAL
WBC NRBC COR # BLD: 9.66 10*3/MM3 (ref 4.5–10.7)
WBC UR QL AUTO: NORMAL /HPF

## 2018-11-21 PROCEDURE — 80053 COMPREHEN METABOLIC PANEL: CPT | Performed by: OBSTETRICS & GYNECOLOGY

## 2018-11-21 PROCEDURE — 86901 BLOOD TYPING SEROLOGIC RH(D): CPT | Performed by: OBSTETRICS & GYNECOLOGY

## 2018-11-21 PROCEDURE — G0378 HOSPITAL OBSERVATION PER HR: HCPCS

## 2018-11-21 PROCEDURE — 86900 BLOOD TYPING SEROLOGIC ABO: CPT | Performed by: OBSTETRICS & GYNECOLOGY

## 2018-11-21 PROCEDURE — 86850 RBC ANTIBODY SCREEN: CPT | Performed by: OBSTETRICS & GYNECOLOGY

## 2018-11-21 PROCEDURE — 85025 COMPLETE CBC W/AUTO DIFF WBC: CPT | Performed by: OBSTETRICS & GYNECOLOGY

## 2018-11-21 PROCEDURE — 99219 PR INITIAL OBSERVATION CARE/DAY 50 MINUTES: CPT | Performed by: OBSTETRICS & GYNECOLOGY

## 2018-11-21 PROCEDURE — 76819 FETAL BIOPHYS PROFIL W/O NST: CPT | Performed by: OBSTETRICS & GYNECOLOGY

## 2018-11-21 PROCEDURE — 0502F SUBSEQUENT PRENATAL CARE: CPT | Performed by: OBSTETRICS & GYNECOLOGY

## 2018-11-21 PROCEDURE — 25010000002 BETAMETHASONE ACET & SOD PHOS PER 4 MG: Performed by: OBSTETRICS & GYNECOLOGY

## 2018-11-21 PROCEDURE — 81001 URINALYSIS AUTO W/SCOPE: CPT | Performed by: OBSTETRICS & GYNECOLOGY

## 2018-11-21 RX ORDER — LABETALOL HYDROCHLORIDE 5 MG/ML
20 INJECTION, SOLUTION INTRAVENOUS ONCE
Status: COMPLETED | OUTPATIENT
Start: 2018-11-21 | End: 2018-11-21

## 2018-11-21 RX ORDER — BETAMETHASONE SODIUM PHOSPHATE AND BETAMETHASONE ACETATE 3; 3 MG/ML; MG/ML
12 INJECTION, SUSPENSION INTRA-ARTICULAR; INTRALESIONAL; INTRAMUSCULAR; SOFT TISSUE EVERY 24 HOURS
Status: COMPLETED | OUTPATIENT
Start: 2018-11-21 | End: 2018-11-22

## 2018-11-21 RX ORDER — LABETALOL 200 MG/1
400 TABLET, FILM COATED ORAL EVERY 12 HOURS SCHEDULED
Status: DISCONTINUED | OUTPATIENT
Start: 2018-11-21 | End: 2018-11-24 | Stop reason: HOSPADM

## 2018-11-21 RX ADMIN — BETAMETHASONE ACETATE AND BETAMETHASONE SODIUM PHOSPHATE 12 MG: 3; 3 INJECTION, SUSPENSION INTRA-ARTICULAR; INTRALESIONAL; INTRAMUSCULAR; SOFT TISSUE at 17:56

## 2018-11-21 RX ADMIN — LABETALOL HYDROCHLORIDE 20 MG: 5 INJECTION, SOLUTION INTRAVENOUS at 17:18

## 2018-11-21 RX ADMIN — LABETALOL HCL 400 MG: 200 TABLET, FILM COATED ORAL at 19:29

## 2018-11-21 NOTE — H&P
Patient Care Team:  Xochitl Hall DO as PCP - General    Chief complaint high blood pressures    Subjective     History of Present Illness - Patient is a 28 year old  with chronic HTN who was seen in office today for routine visit.  Patient was feeling well; however, BP was noted to be 150's/100's on two separate readings.  She had trace protein.  She denied headache or visual symptoms.  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  She took Procardia prior to pregnancy for HTN.    Review of Systems   Constitutional: Negative for chills and fever.   HENT: Negative for hearing loss and nosebleeds.    Eyes: Negative for photophobia and visual disturbance.   Respiratory: Positive for cough. Negative for chest tightness and shortness of breath.    Cardiovascular: Negative for chest pain and palpitations.   Gastrointestinal: Negative for abdominal pain, nausea and vomiting.   Genitourinary: Negative for dysuria and frequency.   Musculoskeletal: Negative for gait problem and joint swelling.   Skin: Negative for pallor and rash.   Neurological: Negative for tremors and speech difficulty.   Hematological: Negative for adenopathy. Does not bruise/bleed easily.   Psychiatric/Behavioral: Negative for behavioral problems and confusion.        Past Medical History:   Diagnosis Date   • Elevated liver enzymes    • Gestational hypertension    • Hypertension      Past Surgical History:   Procedure Laterality Date   • ADENOIDECTOMY     • WISDOM TOOTH EXTRACTION       Family History   Problem Relation Age of Onset   • Breast cancer Neg Hx    • Ovarian cancer Neg Hx    • Uterine cancer Neg Hx    • Colon cancer Neg Hx      Social History     Tobacco Use   • Smoking status: Never Smoker   • Smokeless tobacco: Never Used   Substance Use Topics   • Alcohol use: No   • Drug use: No     Medications Prior to Admission   Medication Sig Dispense  Refill Last Dose   • aspirin 81 MG chewable tablet Chew 81 mg Daily.   11/20/2018 at 0900   • Cholecalciferol (VITAMIN D) 2000 units capsule Take  by mouth Daily.   11/20/2018 at 0900   • levothyroxine (SYNTHROID) 50 MCG tablet Take 1 tablet by mouth Daily. 30 tablet 1 11/21/2018 at 0800   • NIFEdipine CC (ADALAT CC) 60 MG 24 hr tablet Take 1 tablet by mouth Daily. 28 tablet 5 11/21/2018 at 0800   • Prenatal Vit-Fe Fumarate-FA (PRENATAL, CLASSIC, VITAMIN) 28-0.8 MG tablet tablet Take 1 tablet by mouth Daily.   11/20/2018 at 0900   • vitamin B-12 (CYANOCOBALAMIN) 1000 MCG tablet Take 1,000 mcg by mouth Daily.   11/20/2018 at 0900   • vitamin E 1000 UNIT capsule Take 1,000 Units by mouth Daily.   11/20/2018 at 0900     Allergies:  Patient has no known allergies.    Objective      Vital Signs  Temp:  [98.1 °F (36.7 °C)] 98.1 °F (36.7 °C)  Heart Rate:  [75-97] 82  Resp:  [18] 18  BP: (162-185)/(106-116) 185/116    Physical Exam   Constitutional: She appears well-developed and well-nourished.   HENT:   Right Ear: External ear normal.   Left Ear: External ear normal.   Nose: Nose normal.   Eyes: Conjunctivae are normal.   Neck: Normal range of motion.   Cardiovascular: Normal rate, regular rhythm and normal heart sounds.   Pulmonary/Chest: Effort normal. No stridor. She has no wheezes.   Abdominal: Soft. There is no tenderness. There is no guarding.   Musculoskeletal: Normal range of motion. She exhibits edema.   Neurological: She is alert. Coordination normal.   Skin: Skin is warm and dry.   Psychiatric: She has a normal mood and affect. Her behavior is normal. Judgment and thought content normal.   Vitals reviewed.      Results Review:  WBC   Date Value Ref Range Status   11/21/2018 9.66 4.50 - 10.70 10*3/mm3 Final     RBC   Date Value Ref Range Status   11/21/2018 4.10 3.90 - 5.20 10*6/mm3 Final     Hemoglobin   Date Value Ref Range Status   11/21/2018 12.8 11.9 - 15.5 g/dL Final     Hematocrit   Date Value Ref Range  Status   11/21/2018 36.8 35.6 - 45.5 % Final     MCV   Date Value Ref Range Status   11/21/2018 89.8 80.5 - 98.2 fL Final     MCH   Date Value Ref Range Status   11/21/2018 31.2 26.9 - 32.0 pg Final     MCHC   Date Value Ref Range Status   11/21/2018 34.8 32.4 - 36.3 g/dL Final     RDW   Date Value Ref Range Status   11/21/2018 13.7 (H) 11.7 - 13.0 % Final     RDW-SD   Date Value Ref Range Status   11/21/2018 44.6 37.0 - 54.0 fl Final     MPV   Date Value Ref Range Status   11/21/2018 13.1 (H) 6.0 - 12.0 fL Final     Platelets   Date Value Ref Range Status   11/21/2018 163 140 - 500 10*3/mm3 Final     Neutrophil %   Date Value Ref Range Status   11/21/2018 61.8 42.7 - 76.0 % Final     Lymphocyte %   Date Value Ref Range Status   11/21/2018 28.2 19.6 - 45.3 % Final     Monocyte %   Date Value Ref Range Status   11/21/2018 9.2 5.0 - 12.0 % Final     Eosinophil %   Date Value Ref Range Status   11/21/2018 0.5 0.3 - 6.2 % Final     Basophil %   Date Value Ref Range Status   11/21/2018 0.3 0.0 - 1.5 % Final     Immature Grans %   Date Value Ref Range Status   11/21/2018 0.3 0.0 - 0.5 % Final     Neutrophils, Absolute   Date Value Ref Range Status   11/21/2018 5.97 1.90 - 8.10 10*3/mm3 Final     Lymphocytes, Absolute   Date Value Ref Range Status   11/21/2018 2.72 0.90 - 4.80 10*3/mm3 Final     Monocytes, Absolute   Date Value Ref Range Status   11/21/2018 0.89 0.20 - 1.20 10*3/mm3 Final     Eosinophils, Absolute   Date Value Ref Range Status   11/21/2018 0.05 0.00 - 0.70 10*3/mm3 Final     Basophils, Absolute   Date Value Ref Range Status   11/21/2018 0.03 0.00 - 0.20 10*3/mm3 Final     Immature Grans, Absolute   Date Value Ref Range Status   11/21/2018 0.03 0.00 - 0.03 10*3/mm3 Final     Lab Results   Component Value Date    BUN 6 11/16/2018    CREATININE 0.42 (L) 11/16/2018    EGFRIFNONA >150 11/16/2018    EGFRIFAFRI >150 11/16/2018    BCR 14.3 11/16/2018    K 3.9 11/16/2018    CO2 21.7 (L) 11/16/2018    CALCIUM 9.2  11/16/2018    PROTENTOTREF 6.1 11/16/2018    ALBUMIN 3.40 (L) 11/16/2018    LABIL2 1.3 11/16/2018    AST 32 11/16/2018    ALT 37 (H) 11/16/2018     NST Category 1      Assessment/Plan       Hypertension affecting pregnancy      Assessment & Plan  IUP at 33 weeks with chronic HTN, possible superimposed pre-eclampsia.  - Upon initial evaluation, BP were in severe range with -180's/110's.  I ordered betamethasone and gave one dose of IV Labetalol 20 mg.  This brought blood pressure down to normal range.  Plan to admit patient and complete 24 hour urine and finish course of steroids.  Serial BP monitored.  Will add Labetalol to regimen and start at 400 mg po BID.    - Fetal status reassuring overall.  I discussed the patients findings and my recommendations with patient and family    Heber Winters MD  11/21/18  6:49 PM    Time: More than 50% of time spent in counseling and coordination of care:  Total face-to-face/floor time 45 min.  Time spent in counseling 25 min. Counseling included the following topics: management of pregnancy induced hypertension in 3rd trimester.

## 2018-11-21 NOTE — PROGRESS NOTES
Cc:  Pregnancy follow up.  Patient presents today and feels well.  She denies headache, chest pain or visual symptoms.  Reports AFM.  No bleeding or spotting.  BPP done 8/8 with normal MAIK  BP repeated at 166/110  Patient sent to labor and delivery for further evaluation

## 2018-11-22 PROBLEM — Z34.90 PREGNANCY: Status: ACTIVE | Noted: 2018-11-22

## 2018-11-22 LAB
COLLECT DURATION TIME UR: 24 HRS
COLLECT DURATION TIME UR: 24 HRS
CREAT UR-MCNC: 77.9 MG/DL
CREATINE 24H UR-MRATE: 1.6 G/24 HR (ref 0.7–1.6)
PROT 24H UR-MRATE: 943 MG/24HOURS (ref 0–150)
PROT UR-MCNC: 46 MG/DL
SPECIMEN VOL 24H UR: 2050 ML
SPECIMEN VOL 24H UR: 2050 ML

## 2018-11-22 PROCEDURE — 82570 ASSAY OF URINE CREATININE: CPT | Performed by: OBSTETRICS & GYNECOLOGY

## 2018-11-22 PROCEDURE — 59025 FETAL NON-STRESS TEST: CPT

## 2018-11-22 PROCEDURE — 59025 FETAL NON-STRESS TEST: CPT | Performed by: OBSTETRICS & GYNECOLOGY

## 2018-11-22 PROCEDURE — 81050 URINALYSIS VOLUME MEASURE: CPT | Performed by: OBSTETRICS & GYNECOLOGY

## 2018-11-22 PROCEDURE — 84156 ASSAY OF PROTEIN URINE: CPT | Performed by: OBSTETRICS & GYNECOLOGY

## 2018-11-22 PROCEDURE — 25010000002 BETAMETHASONE ACET & SOD PHOS PER 4 MG: Performed by: OBSTETRICS & GYNECOLOGY

## 2018-11-22 RX ORDER — LEVOTHYROXINE SODIUM 0.05 MG/1
50 TABLET ORAL
Status: DISCONTINUED | OUTPATIENT
Start: 2018-11-23 | End: 2018-11-24 | Stop reason: HOSPADM

## 2018-11-22 RX ORDER — NIFEDIPINE 30 MG/1
30 TABLET, EXTENDED RELEASE ORAL
Status: DISCONTINUED | OUTPATIENT
Start: 2018-11-22 | End: 2018-11-23

## 2018-11-22 RX ADMIN — LABETALOL HCL 400 MG: 200 TABLET, FILM COATED ORAL at 08:18

## 2018-11-22 RX ADMIN — BETAMETHASONE ACETATE AND BETAMETHASONE SODIUM PHOSPHATE 12 MG: 3; 3 INJECTION, SUSPENSION INTRA-ARTICULAR; INTRALESIONAL; INTRAMUSCULAR; SOFT TISSUE at 18:08

## 2018-11-22 RX ADMIN — NIFEDIPINE 30 MG: 30 TABLET, FILM COATED, EXTENDED RELEASE ORAL at 21:55

## 2018-11-22 RX ADMIN — LABETALOL HCL 400 MG: 200 TABLET, FILM COATED ORAL at 20:17

## 2018-11-22 NOTE — PLAN OF CARE
Problem: Patient Care Overview  Goal: Plan of Care Review  Outcome: Ongoing (interventions implemented as appropriate)   11/22/18 0607   Coping/Psychosocial   Plan of Care Reviewed With patient;spouse   Plan of Care Review   Progress improving   OTHER   Outcome Summary Pt admitted with elevated BPs. After labatelol administration returned to WNLs. RNST. +FM. Denies LOF, VB, CTXs, visual disturbances, epigastric pain, HA. Rested well overnight.      Goal: Individualization and Mutuality  Outcome: Ongoing (interventions implemented as appropriate)    Goal: Discharge Needs Assessment  Outcome: Ongoing (interventions implemented as appropriate)   11/22/18 0607   Discharge Needs Assessment   Readmission Within the Last 30 Days no previous admission in last 30 days   Concerns to be Addressed no discharge needs identified   Patient/Family Anticipates Transition to home with family   Patient/Family Anticipated Services at Transition none   Transportation Anticipated car, drives self   Equipment Needed After Discharge none   Disability   Equipment Currently Used at Home none     Goal: Interprofessional Rounds/Family Conf  Outcome: Ongoing (interventions implemented as appropriate)   11/22/18 0607   Interdisciplinary Rounds/Family Conf   Participants patient;physician;nursing;pharmacy       Problem: Hypertensive Disorders in Pregnancy (Adult,Obstetrics,Pediatric)  Goal: Signs and Symptoms of Listed Potential Problems Will be Absent, Minimized or Managed (Hypertensive Disorders in Pregnancy)  Outcome: Ongoing (interventions implemented as appropriate)   11/22/18 0607   Goal/Outcome Evaluation   Problems Assessed (Hypertensive Disorders in Pregnancy) all   Problems Present (Hypertensive/in PG) none       Problem: Prenatal Patient, Hospitalized (Adult,Obstetrics,Pediatric)  Goal: Signs and Symptoms of Listed Potential Problems Will be Absent, Minimized or Managed (Prenatal Patient, Hospitalized)  Outcome: Ongoing (interventions  implemented as appropriate)   11/22/18 0607   Goal/Outcome Evaluation   Problems Assessed (Prenatal Patient) all   Problems Present (Prenatal Patient) none

## 2018-11-22 NOTE — PROGRESS NOTES
DAILY PROGRESS NOTE    Patient Identification:  Name: Callie Parks  Age: 28 y.o.  Sex: female  :  1990  MRN: [id number]               Subjective:  Interval History: 34 0/7 weeks with Chronic Hypertension  And superimposed PIH. No c/o headache, abdominal pain etc.  LFTs better this AM   with only elevation SGPT= 35 , lower than in MAY 2018 .  Pt states prior to pregnancy LFTs were worse. .  Platelet ct= 163.    Objective:    Scheduled Meds:  betamethasone acetate-betamethasone sodium phosphate 12 mg Intramuscular Q24H   labetalol 400 mg Oral Q12H     Continuous Infusions:   PRN Meds:    Vital signs in last 24 hours:  Temp:  [98.1 °F (36.7 °C)-98.7 °F (37.1 °C)] 98.7 °F (37.1 °C)  Heart Rate:  [71-97] 83  Resp:  [18] 18  BP: (127-185)/() 159/91    Intake/Output:    Intake/Output Summary (Last 24 hours) at 201838  Last data filed at 2018  Gross per 24 hour   Intake --   Output 400 ml   Net -400 ml       Exam:  General Appearance:    Alert, cooperative, no distress, appears stated age   Lungs:     Clear to auscultation bilaterally, respirations unlabored    Heart:    Regular rate and rhythm, S1 and S2 normal, no murmur, rub   or gallop   Abdomen:     Soft, non-tender, bowel sounds active all four quadrants,     no masses, no organomegaly   Extremities:   Extremities normal, atraumatic, no cyanosis or edema   Skin:   Skin color, texture, turgor normal, no rashes or lesions        Data Review:    Lab Results (last 24 hours)     Procedure Component Value Units Date/Time    Comprehensive Metabolic Panel [[id number]]  (Abnormal) Collected:  18    Specimen:  Blood Updated:  18     Glucose 85 mg/dL      BUN 7 mg/dL      Creatinine 0.43 mg/dL      Sodium 139 mmol/L      Potassium 3.7 mmol/L      Chloride 106 mmol/L      CO2 20.3 mmol/L      Calcium 9.1 mg/dL      Total Protein 6.0 g/dL      Albumin 3.20 g/dL      ALT (SGPT) 35 U/L      AST (SGOT) 32 U/L       Alkaline Phosphatase 137 U/L      Total Bilirubin 0.2 mg/dL      eGFR Non African Amer >150 mL/min/1.73      Globulin 2.8 gm/dL      A/G Ratio 1.1 g/dL      BUN/Creatinine Ratio 16.3     Anion Gap 12.7 mmol/L     CBC & Differential [[id number]] Collected:  11/21/18 1716    Specimen:  Blood Updated:  11/21/18 1734    Narrative:       The following orders were created for panel order CBC & Differential.  Procedure                               Abnormality         Status                     ---------                               -----------         ------                     CBC Auto Differential[[id number]]        Abnormal            Final result                 Please view results for these tests on the individual orders.    CBC Auto Differential [[id number]]  (Abnormal) Collected:  11/21/18 1716    Specimen:  Blood Updated:  11/21/18 1734     WBC 9.66 10*3/mm3      RBC 4.10 10*6/mm3      Hemoglobin 12.8 g/dL      Hematocrit 36.8 %      MCV 89.8 fL      MCH 31.2 pg      MCHC 34.8 g/dL      RDW 13.7 %      RDW-SD 44.6 fl      MPV 13.1 fL      Platelets 163 10*3/mm3      Neutrophil % 61.8 %      Lymphocyte % 28.2 %      Monocyte % 9.2 %      Eosinophil % 0.5 %      Basophil % 0.3 %      Immature Grans % 0.3 %      Neutrophils, Absolute 5.97 10*3/mm3      Lymphocytes, Absolute 2.72 10*3/mm3      Monocytes, Absolute 0.89 10*3/mm3      Eosinophils, Absolute 0.05 10*3/mm3      Basophils, Absolute 0.03 10*3/mm3      Immature Grans, Absolute 0.03 10*3/mm3     Urinalysis With Microscopic If Indicated (No Culture) - Urine, Clean Catch [[id number]]  (Abnormal) Collected:  11/21/18 1641    Specimen:  Urine, Clean Catch Updated:  11/21/18 1657     Color, UA Yellow     Appearance, UA Clear     pH, UA 6.5     Specific Gravity, UA 1.009     Glucose, UA Negative     Ketones, UA Negative     Bilirubin, UA Negative     Blood, UA Negative     Protein,  mg/dL (2+)     Leuk Esterase, UA Negative     Nitrite, UA Negative      Urobilinogen, UA 0.2 E.U./dL    Urinalysis, Microscopic Only - Urine, Clean Catch [[id number]] Collected:  11/21/18 1641    Specimen:  Urine, Clean Catch Updated:  11/21/18 1657     RBC, UA 0-2 /HPF      WBC, UA 0-2 /HPF      Bacteria, UA None Seen /HPF      Squamous Epithelial Cells, UA 0-2 /HPF      Hyaline Casts, UA None Seen /LPF      Methodology Automated Microscopy        Assessment:    Hypertension affecting pregnancy    Pregnancy    1. 34 Weeks with CHT and superimposed PIH  , no evidence HELLP as LFTs always mildly elevated and platelets normal    Plan:  1. Continue 24 hour urine today and to receive 2nd dose steroids   2. OK for Reg diet     Brigido Bejarano MD  11/22/2018

## 2018-11-22 NOTE — NON STRESS TEST
Callie Parks, a  at 34w0d with an ELISABETH of 1/3/2019, by Last Menstrual Period, was seen at [facility] for a nonstress test.    Chief Complaint   Patient presents with   • Hypertension-Pregnant     Elevated BPS in the office. Swelling. No HA, visual changes       Patient Active Problem List   Diagnosis   • High-risk pregnancy   • Chronic hypertension affecting pregnancy   • Subclinical hypothyroidism   • Transaminitis   • Abnormal glucose affecting pregnancy   • Hypertension affecting pregnancy   • Pregnancy       Start Time: 826  Stop Time: 906  Interpretation A  Nonstress Test Interpretation A: Reactive (18 0900 : Calista Holm, RN)

## 2018-11-23 LAB
ALBUMIN SERPL-MCNC: 2.7 G/DL (ref 3.5–5.2)
ALBUMIN/GLOB SERPL: 0.9 G/DL
ALP SERPL-CCNC: 119 U/L (ref 39–117)
ALT SERPL W P-5'-P-CCNC: 24 U/L (ref 1–33)
ANION GAP SERPL CALCULATED.3IONS-SCNC: 12.8 MMOL/L
AST SERPL-CCNC: 19 U/L (ref 1–32)
BASOPHILS # BLD AUTO: 0.02 10*3/MM3 (ref 0–0.2)
BASOPHILS NFR BLD AUTO: 0.2 % (ref 0–1.5)
BILIRUB SERPL-MCNC: 0.2 MG/DL (ref 0.1–1.2)
BUN BLD-MCNC: 8 MG/DL (ref 6–20)
BUN/CREAT SERPL: 15.1 (ref 7–25)
CALCIUM SPEC-SCNC: 9 MG/DL (ref 8.6–10.5)
CHLORIDE SERPL-SCNC: 110 MMOL/L (ref 98–107)
CO2 SERPL-SCNC: 18.2 MMOL/L (ref 22–29)
CREAT BLD-MCNC: 0.53 MG/DL (ref 0.57–1)
DEPRECATED RDW RBC AUTO: 48.3 FL (ref 37–54)
EOSINOPHIL # BLD AUTO: 0 10*3/MM3 (ref 0–0.7)
EOSINOPHIL NFR BLD AUTO: 0 % (ref 0.3–6.2)
ERYTHROCYTE [DISTWIDTH] IN BLOOD BY AUTOMATED COUNT: 14.3 % (ref 11.7–13)
GFR SERPL CREATININE-BSD FRML MDRD: 139 ML/MIN/1.73
GLOBULIN UR ELPH-MCNC: 3 GM/DL
GLUCOSE BLD-MCNC: 113 MG/DL (ref 65–99)
HCT VFR BLD AUTO: 33.8 % (ref 35.6–45.5)
HGB BLD-MCNC: 11.2 G/DL (ref 11.9–15.5)
LYMPHOCYTES # BLD AUTO: 1.6 10*3/MM3 (ref 0.9–4.8)
LYMPHOCYTES NFR BLD AUTO: 17.5 % (ref 19.6–45.3)
MCH RBC QN AUTO: 30.7 PG (ref 26.9–32)
MCHC RBC AUTO-ENTMCNC: 33.1 G/DL (ref 32.4–36.3)
MCV RBC AUTO: 92.6 FL (ref 80.5–98.2)
MONOCYTES # BLD AUTO: 0.47 10*3/MM3 (ref 0.2–1.2)
MONOCYTES NFR BLD AUTO: 5.2 % (ref 5–12)
NEUTROPHILS # BLD AUTO: 7.03 10*3/MM3 (ref 1.9–8.1)
NEUTROPHILS NFR BLD AUTO: 77.1 % (ref 42.7–76)
PLATELET # BLD AUTO: 140 10*3/MM3 (ref 140–500)
PMV BLD AUTO: 12.7 FL (ref 6–12)
POTASSIUM BLD-SCNC: 4.2 MMOL/L (ref 3.5–5.2)
PROT SERPL-MCNC: 5.7 G/DL (ref 6–8.5)
RBC # BLD AUTO: 3.65 10*6/MM3 (ref 3.9–5.2)
SODIUM BLD-SCNC: 141 MMOL/L (ref 136–145)
WBC NRBC COR # BLD: 9.12 10*3/MM3 (ref 4.5–10.7)

## 2018-11-23 PROCEDURE — 59025 FETAL NON-STRESS TEST: CPT | Performed by: OBSTETRICS & GYNECOLOGY

## 2018-11-23 PROCEDURE — 80053 COMPREHEN METABOLIC PANEL: CPT | Performed by: OBSTETRICS & GYNECOLOGY

## 2018-11-23 PROCEDURE — 85027 COMPLETE CBC AUTOMATED: CPT | Performed by: OBSTETRICS & GYNECOLOGY

## 2018-11-23 PROCEDURE — 59025 FETAL NON-STRESS TEST: CPT

## 2018-11-23 PROCEDURE — 59020 FETAL CONTRACT STRESS TEST: CPT

## 2018-11-23 PROCEDURE — 99232 SBSQ HOSP IP/OBS MODERATE 35: CPT | Performed by: OBSTETRICS & GYNECOLOGY

## 2018-11-23 RX ORDER — NIFEDIPINE 30 MG/1
30 TABLET, EXTENDED RELEASE ORAL
Status: DISCONTINUED | OUTPATIENT
Start: 2018-11-24 | End: 2018-11-24 | Stop reason: HOSPADM

## 2018-11-23 RX ADMIN — LABETALOL HCL 400 MG: 200 TABLET, FILM COATED ORAL at 20:54

## 2018-11-23 RX ADMIN — LEVOTHYROXINE SODIUM 50 MCG: 50 TABLET ORAL at 06:25

## 2018-11-23 RX ADMIN — LABETALOL HCL 400 MG: 200 TABLET, FILM COATED ORAL at 08:50

## 2018-11-23 NOTE — PLAN OF CARE
Problem: Patient Care Overview  Goal: Individualization and Mutuality   11/22/18 0607   Individualization   Patient Specific Interventions BP monitored. Fetal monitoring. Medications as ordered. 24 hour urine initiated/.       Problem: Hypertensive Disorders in Pregnancy (Adult,Obstetrics,Pediatric)  Intervention: Promote/Monitor Maternal/Fetal Wellbeing   11/23/18 0557   Reproductive Interventions   Fetal Wellbeing Promotion fetal heart rate monitored;intake and output monitored   Positioning   Body Position position maintained

## 2018-11-23 NOTE — PROGRESS NOTES
Patient name: Callie Parks  Age: 28 y.o.  Sex: female  YOB: 1990   MRN: [id number]  Admission Date: 2018    Gestational age: 34w1d    Chief Complaint   Patient presents with   • Hypertension-Pregnant     Elevated BPS in the office. Swelling. No HA, visual changes      Subjective:    Callie Parks is a 28 y.o.  at 34w1d who presented with new elevation in blood pressures, evaluation for severe preeclampsia.    Patient denies headache/vision changes/RUQ pain. Notes normal fetal movements.    She denies chest pain or SOB    Objective:   Temp:  [97.9 °F (36.6 °C)] 97.9 °F (36.6 °C)  Heart Rate:  [70-83] 79  Resp:  [16-18] 18  BP: (129-180)/(75-99) 149/89   Vitals:    18 0303 18 0402 18 0628 18 0832   BP: 129/75 156/83 153/87 149/89   BP Location: Right arm Right arm Right arm Right arm   Patient Position: Lying Lying Lying Lying   Pulse: 75 81 77 79   Resp:    18   Temp:    97.9 °F (36.6 °C)   TempSrc:    Oral   SpO2:    98%   Weight:       Height:            Exam:     Physical Examination: General appearance - alert, well appearing, and in no distress  Mental status - alert, oriented to person, place, and time  Eyes - sclera anicteric, left eye normal, right eye normal  Ears - right ear normal, left ear normal  Neck - normal ROM  Chest - clear to auscultation, no wheezes, rales or rhonchi, symmetric air entry  Heart - normal rate and regular rhythm  Abdomen - soft, nontender, gravid,   Pelvic - examination not indicated  Back exam -  CVA tenderness  Neurological - alert, oriented, normal speech, no focal findings or movement disorder noted, 2 + reflexes, no clonus  Musculoskeletal - no joint tenderness, deformity or swelling  Extremities -  pedal edema noted  Skin - normal coloration and turgor, no rashes, no suspicious skin lesions noted    Labs:  Lab Results   Component Value Date    WBC 9.12 2018    HGB 11.2 (L) 2018    HCT 33.8 (L) 2018     MCV 92.6 11/23/2018     11/23/2018       NST: Category 1        Assessment:      IUP at 34w1d   Chronic hypertension with superimposed preeclampsia    Plan:    Continue labetalol 400mg BID  Started on Procardia 30XL last night (was on Procardia 60XL at home).  Will time Procardia 30XL for midway between doses of labetalol.  At this time, no symptoms of severe disease.  Would monitor BP for at least next 24 hours and repeat labs in AM.  Discussed with patient activity restriction for the remainder of pregnancy.  Normal activities of daily living are acceptable, but would limit working (currently works 3 twelve hour shifts per week) and possibility of inpatient management till delivery versus outpatient management with frequent visits/fetal monitoring/labs.  Patient expressed understanding   NST BID  Felicia Boswell MD  11/23/2018  10:26 AM

## 2018-11-23 NOTE — PLAN OF CARE
Problem: Patient Care Overview  Goal: Plan of Care Review  Outcome: Ongoing (interventions implemented as appropriate)   11/22/18 0607 11/22/18 0831 11/22/18 1937   Coping/Psychosocial   Plan of Care Reviewed With --  patient;spouse;family --    Plan of Care Review   Progress improving --  --    OTHER   Outcome Summary --  --  pt finished 24 hr urine, continue to monitor BPs will draw labs in AM     Goal: Discharge Needs Assessment  Outcome: Ongoing (interventions implemented as appropriate)   11/22/18 0607   Discharge Needs Assessment   Readmission Within the Last 30 Days no previous admission in last 30 days   Concerns to be Addressed no discharge needs identified   Patient/Family Anticipates Transition to home with family   Patient/Family Anticipated Services at Transition none   Transportation Anticipated car, drives self   Equipment Needed After Discharge none   Disability   Equipment Currently Used at Home none     Goal: Interprofessional Rounds/Family Conf  Outcome: Ongoing (interventions implemented as appropriate)   11/22/18 0607   Interdisciplinary Rounds/Family Conf   Participants patient;physician;nursing;pharmacy       Problem: Hypertensive Disorders in Pregnancy (Adult,Obstetrics,Pediatric)  Goal: Signs and Symptoms of Listed Potential Problems Will be Absent, Minimized or Managed (Hypertensive Disorders in Pregnancy)  Outcome: Ongoing (interventions implemented as appropriate)   11/22/18 1937   Goal/Outcome Evaluation   Problems Assessed (Hypertensive Disorders in Pregnancy) all   Problems Present (Hypertensive/in PG) none       Problem: Prenatal Patient, Hospitalized (Adult,Obstetrics,Pediatric)  Goal: Signs and Symptoms of Listed Potential Problems Will be Absent, Minimized or Managed (Prenatal Patient, Hospitalized)  Outcome: Ongoing (interventions implemented as appropriate)   11/22/18 1937   Goal/Outcome Evaluation   Problems Assessed (Prenatal Patient) all   Problems Present (Prenatal Patient)  none

## 2018-11-23 NOTE — PLAN OF CARE
Problem: Patient Care Overview  Goal: Plan of Care Review  Outcome: Ongoing (interventions implemented as appropriate)   11/23/18 1850   Coping/Psychosocial   Plan of Care Reviewed With patient   Plan of Care Review   Progress improving     Goal: Individualization and Mutuality  Outcome: Ongoing (interventions implemented as appropriate)   11/22/18 0607 11/23/18 1850   Individualization   Patient Specific Goals (Include Timeframe) WNL BPS. Maintain pregnancy to term.  --    Patient Specific Interventions --  blood pressures every 4 hours, patient on labetalol and procardia scheduled, monitor for symptoms of worsening disease process.        Problem: Hypertensive Disorders in Pregnancy (Adult,Obstetrics,Pediatric)  Goal: Signs and Symptoms of Listed Potential Problems Will be Absent, Minimized or Managed (Hypertensive Disorders in Pregnancy)  Outcome: Ongoing (interventions implemented as appropriate)   11/23/18 1850   Goal/Outcome Evaluation   Problems Assessed (Hypertensive Disorders in Pregnancy) all   Problems Present (Hypertensive/in PG) none       Problem: Prenatal Patient, Hospitalized (Adult,Obstetrics,Pediatric)  Goal: Signs and Symptoms of Listed Potential Problems Will be Absent, Minimized or Managed (Prenatal Patient, Hospitalized)  Outcome: Ongoing (interventions implemented as appropriate)   11/23/18 1850   Goal/Outcome Evaluation   Problems Assessed (Prenatal Patient) all   Problems Present (Prenatal Patient) none          not examined

## 2018-11-23 NOTE — NON STRESS TEST
Callie Parks, a  at 34w1d with an ELISABETH of 1/3/2019, by Last Menstrual Period, was seen at [facility] ANTEPARTUM UNIT for a nonstress test.    Chief Complaint   Patient presents with   • Hypertension-Pregnant     Elevated BPS in the office. Swelling. No HA, visual changes       Patient Active Problem List   Diagnosis   • High-risk pregnancy   • Chronic hypertension affecting pregnancy   • Subclinical hypothyroidism   • Transaminitis   • Abnormal glucose affecting pregnancy   • Hypertension affecting pregnancy   • Pregnancy       Start Time: 1025  Stop Time:     Interpretation A  Nonstress Test Interpretation A: Reactive (18 1056 : Paloma Young RN)

## 2018-11-24 VITALS
BODY MASS INDEX: 37.51 KG/M2 | HEART RATE: 66 BPM | TEMPERATURE: 97.7 F | SYSTOLIC BLOOD PRESSURE: 136 MMHG | DIASTOLIC BLOOD PRESSURE: 85 MMHG | RESPIRATION RATE: 18 BRPM | HEIGHT: 67 IN | OXYGEN SATURATION: 98 % | WEIGHT: 239 LBS

## 2018-11-24 LAB
ALBUMIN SERPL-MCNC: 2.9 G/DL (ref 3.5–5.2)
ALBUMIN/GLOB SERPL: 1 G/DL
ALP SERPL-CCNC: 119 U/L (ref 39–117)
ALT SERPL W P-5'-P-CCNC: 22 U/L (ref 1–33)
ANION GAP SERPL CALCULATED.3IONS-SCNC: 11.5 MMOL/L
AST SERPL-CCNC: 20 U/L (ref 1–32)
BASOPHILS # BLD AUTO: 0.04 10*3/MM3 (ref 0–0.2)
BASOPHILS NFR BLD AUTO: 0.4 % (ref 0–1.5)
BILIRUB SERPL-MCNC: <0.2 MG/DL (ref 0.1–1.2)
BUN BLD-MCNC: 6 MG/DL (ref 6–20)
BUN/CREAT SERPL: 11.5 (ref 7–25)
CALCIUM SPEC-SCNC: 8.9 MG/DL (ref 8.6–10.5)
CHLORIDE SERPL-SCNC: 109 MMOL/L (ref 98–107)
CO2 SERPL-SCNC: 23.5 MMOL/L (ref 22–29)
CREAT BLD-MCNC: 0.52 MG/DL (ref 0.57–1)
DEPRECATED RDW RBC AUTO: 51.7 FL (ref 37–54)
EOSINOPHIL # BLD AUTO: 0.01 10*3/MM3 (ref 0–0.7)
EOSINOPHIL NFR BLD AUTO: 0.1 % (ref 0.3–6.2)
ERYTHROCYTE [DISTWIDTH] IN BLOOD BY AUTOMATED COUNT: 14.4 % (ref 11.7–13)
GFR SERPL CREATININE-BSD FRML MDRD: 143 ML/MIN/1.73
GLOBULIN UR ELPH-MCNC: 2.8 GM/DL
GLUCOSE BLD-MCNC: 77 MG/DL (ref 65–99)
HCT VFR BLD AUTO: 34 % (ref 35.6–45.5)
HGB BLD-MCNC: 10.6 G/DL (ref 11.9–15.5)
IMM GRANULOCYTES # BLD: 0.08 10*3/MM3 (ref 0–0.03)
IMM GRANULOCYTES NFR BLD: 0.9 % (ref 0–0.5)
LYMPHOCYTES # BLD AUTO: 3.04 10*3/MM3 (ref 0.9–4.8)
LYMPHOCYTES NFR BLD AUTO: 32.3 % (ref 19.6–45.3)
MCH RBC QN AUTO: 30.7 PG (ref 26.9–32)
MCHC RBC AUTO-ENTMCNC: 31.2 G/DL (ref 32.4–36.3)
MCV RBC AUTO: 98.6 FL (ref 80.5–98.2)
MONOCYTES # BLD AUTO: 0.8 10*3/MM3 (ref 0.2–1.2)
MONOCYTES NFR BLD AUTO: 8.5 % (ref 5–12)
NEUTROPHILS # BLD AUTO: 5.43 10*3/MM3 (ref 1.9–8.1)
NEUTROPHILS NFR BLD AUTO: 57.8 % (ref 42.7–76)
PLATELET # BLD AUTO: 142 10*3/MM3 (ref 140–500)
PMV BLD AUTO: 12.6 FL (ref 6–12)
POTASSIUM BLD-SCNC: 3.9 MMOL/L (ref 3.5–5.2)
PROT SERPL-MCNC: 5.7 G/DL (ref 6–8.5)
RBC # BLD AUTO: 3.45 10*6/MM3 (ref 3.9–5.2)
SODIUM BLD-SCNC: 144 MMOL/L (ref 136–145)
WBC NRBC COR # BLD: 9.4 10*3/MM3 (ref 4.5–10.7)

## 2018-11-24 PROCEDURE — 85025 COMPLETE CBC W/AUTO DIFF WBC: CPT | Performed by: OBSTETRICS & GYNECOLOGY

## 2018-11-24 PROCEDURE — 80053 COMPREHEN METABOLIC PANEL: CPT | Performed by: OBSTETRICS & GYNECOLOGY

## 2018-11-24 PROCEDURE — 99238 HOSP IP/OBS DSCHRG MGMT 30/<: CPT | Performed by: OBSTETRICS & GYNECOLOGY

## 2018-11-24 PROCEDURE — 59025 FETAL NON-STRESS TEST: CPT | Performed by: OBSTETRICS & GYNECOLOGY

## 2018-11-24 PROCEDURE — 59025 FETAL NON-STRESS TEST: CPT

## 2018-11-24 RX ORDER — LABETALOL 200 MG/1
400 TABLET, FILM COATED ORAL 2 TIMES DAILY
Qty: 60 TABLET | Refills: 1 | Status: ON HOLD | OUTPATIENT
Start: 2018-11-24 | End: 2018-12-19 | Stop reason: SDUPTHER

## 2018-11-24 RX ORDER — NIFEDIPINE 30 MG/1
30 TABLET, FILM COATED, EXTENDED RELEASE ORAL DAILY
Qty: 30 TABLET | Refills: 1 | Status: ON HOLD | OUTPATIENT
Start: 2018-11-24 | End: 2018-12-19 | Stop reason: SDUPTHER

## 2018-11-24 RX ADMIN — LEVOTHYROXINE SODIUM 50 MCG: 50 TABLET ORAL at 06:28

## 2018-11-24 RX ADMIN — NIFEDIPINE 30 MG: 30 TABLET, FILM COATED, EXTENDED RELEASE ORAL at 02:27

## 2018-11-24 RX ADMIN — LABETALOL HCL 400 MG: 200 TABLET, FILM COATED ORAL at 09:00

## 2018-11-24 NOTE — NON STRESS TEST
Reason for test: Antepartum  Date of Test: 2018  Time frame of test: 9038-9777  RN NST Interpretation: Reactive       Callie Parks, antonio  at 34w1d with an ELISABETH of 1/3/2019, by Last Menstrual Period, was seen at [facility]TEPARTUM UN[facility] for a nonstress test.    Chief Complaint   Patient presents with   • Hypertension-Pregnant     Elevated BPS in the office. Swelling. No HA, visual changes       Patient Active Problem List   Diagnosis   • High-risk pregnancy   • Chronic hypertension affecting pregnancy   • Subclinical hypothyroidism   • Transaminitis   • Abnormal glucose affecting pregnancy   • Hypertension affecting pregnancy   • Pregnancy       Start Time:   Stop Time:

## 2018-11-24 NOTE — DISCHARGE SUMMARY
Date of Discharge:  2018    Discharge Diagnosis: 34 weeks 2 days gestation with chronic hypertension and superimposed PIH    Presenting Problem/History of Present Illness  Hypertension affecting pregnancy [O16.9]  Pregnancy [Z34.90]       Hospital Course  Patient is a 28 y.o. female  admitted 3 days ago with history of chronic hypertension on 60 mg XL Procardia and worsening blood pressures.  Patient received 2 doses of steroids and a 24-hour urine protein was performed and came back elevated at 943 mg.  Patient had no headaches or abdominal pain or other signs of severe PIH.  She was placed on labetalol 400 mg twice a day, and Procardia XL 30 was added.  She is currently doing well on Limited bed rest and would like to go home.  She has been told to stop working.  She can take her blood pressure daily at home and will call if it is rising or she begins to experience headaches blurred vision etc.  She did have a minimal elevation of LFTs at admission, which she says she has even when not pregnant, but this has resolved during her admission and they are now normal today.  Her platelet count is 142,000 and stable.      Procedures Performed         Consults:   Consults     No orders found from 10/23/2018 to 2018.          Pertinent Test Results: as above    Condition on Discharge:  Good     Vital Signs  Temp:  [97.7 °F (36.5 °C)-98 °F (36.7 °C)] 97.7 °F (36.5 °C)  Heart Rate:  [66-80] 66  Resp:  [16-18] 18  BP: (131-150)/(83-98) 136/85    Physical Exam:   VSS afebrile  /85 now.  Fetal movement.  NST reactive.    Discharge Disposition  Home on bedrest    Discharge Medications     Discharge Medications      ASK your doctor about these medications      Instructions Start Date   aspirin 81 MG chewable tablet   81 mg, Oral, Daily      levothyroxine 50 MCG tablet  Commonly known as:  SYNTHROID   50 mcg, Oral, Daily      NIFEdipine CC 60 MG 24 hr tablet  Commonly known as:  ADALAT CC   60 mg, Oral,  Daily      prenatal (CLASSIC) vitamin 28-0.8 MG tablet tablet   1 tablet, Oral, Daily      vitamin B-12 1000 MCG tablet  Commonly known as:  CYANOCOBALAMIN   1,000 mcg, Oral, Daily      Vitamin D 2000 units capsule   Oral, Daily      vitamin E 1000 UNIT capsule   1,000 Units, Oral, Daily             Discharge Diet:   reg  Activity at Discharge:   Bedrest   Follow-up Appointments  Future Appointments   Date Time Provider Department Center   11/30/2018  1:00 PM [facility] None   11/30/2018  1:30 PM Felicia Boswell MD [facility] None   12/6/2018 10:40 AM [facility] None   12/6/2018 11:15 AM Felicia Boswell MD [facility] None   12/13/2018 10:40 AM [facility] None   12/13/2018 11:15 AM Felicia Boswell MD [facility] None   12/13/2018  9:00 PM [facility] LD INDUCTION ROOM [facility] L[facility]         Test Results Pending at Discharge       Brigido Bejarano MD  11/24/18  12:00 PM    Time: 1204

## 2018-11-24 NOTE — NON STRESS TEST
Callie Parks, a  at 34w2d with an ELISABETH of 1/3/2019, by Last Menstrual Period, was seen at [facility]TEPARTUM UNIT for a nonstress test.    Chief Complaint   Patient presents with   • Hypertension-Pregnant     Elevated BPS in the office. Swelling. No HA, visual changes       Patient Active Problem List   Diagnosis   • High-risk pregnancy   • Chronic hypertension affecting pregnancy   • Subclinical hypothyroidism   • Transaminitis   • Abnormal glucose affecting pregnancy   • Hypertension affecting pregnancy   • Pregnancy       Start Time: 957  Stop Time: 1037    Interpretation A  Nonstress Test Interpretation A: Reactive (18 1047 : Carlota White, RN)  Comments A: NST from 0957 to 1037 (18 1047 : Carlota White, RN)

## 2018-11-24 NOTE — PLAN OF CARE
Problem: Patient Care Overview  Goal: Individualization and Mutuality   11/22/18 0607 11/23/18 3301   Individualization   Patient Specific Goals (Include Timeframe) WNL BPS. Maintain pregnancy to term.  --    Patient Specific Interventions --  blood pressures every 4 hours, patient on labetalol and procardia scheduled, monitor for symptoms of worsening disease process.        Problem: Hypertensive Disorders in Pregnancy (Adult,Obstetrics,Pediatric)  Goal: Signs and Symptoms of Listed Potential Problems Will be Absent, Minimized or Managed (Hypertensive Disorders in Pregnancy)  Outcome: Ongoing (interventions implemented as appropriate)      Problem: Prenatal Patient, Hospitalized (Adult,Obstetrics,Pediatric)  Goal: Signs and Symptoms of Listed Potential Problems Will be Absent, Minimized or Managed (Prenatal Patient, Hospitalized)  Outcome: Ongoing (interventions implemented as appropriate)

## 2018-11-24 NOTE — PLAN OF CARE
Problem: Patient Care Overview  Goal: Plan of Care Review  Outcome: Outcome(s) achieved Date Met: 11/24/18 11/24/18 1200   Coping/Psychosocial   Plan of Care Reviewed With patient   Plan of Care Review   Progress improving   OTHER   Outcome Summary PT will follow up Monday amarilys Boswell, Labs improved, desires discharge, will continue bedrest, stay off work     Goal: Individualization and Mutuality  Outcome: Unable to achieve outcome(s) by discharge Date Met: 11/24/18    Goal: Discharge Needs Assessment  Outcome: Outcome(s) achieved Date Met: 11/24/18    Goal: Interprofessional Rounds/Family Conf  Outcome: Outcome(s) achieved Date Met: 11/24/18      Problem: Hypertensive Disorders in Pregnancy (Adult,Obstetrics,Pediatric)  Goal: Signs and Symptoms of Listed Potential Problems Will be Absent, Minimized or Managed (Hypertensive Disorders in Pregnancy)  Outcome: Unable to achieve outcome(s) by discharge Date Met: 11/24/18      Problem: Prenatal Patient, Hospitalized (Adult,Obstetrics,Pediatric)  Goal: Signs and Symptoms of Listed Potential Problems Will be Absent, Minimized or Managed (Prenatal Patient, Hospitalized)  Outcome: Unable to achieve outcome(s) by discharge Date Met: 11/24/18

## 2018-11-26 ENCOUNTER — TELEPHONE (OUTPATIENT)
Dept: OBSTETRICS AND GYNECOLOGY | Facility: CLINIC | Age: 28
End: 2018-11-26

## 2018-11-26 ENCOUNTER — PROCEDURE VISIT (OUTPATIENT)
Dept: OBSTETRICS AND GYNECOLOGY | Facility: CLINIC | Age: 28
End: 2018-11-26

## 2018-11-26 ENCOUNTER — ROUTINE PRENATAL (OUTPATIENT)
Dept: OBSTETRICS AND GYNECOLOGY | Facility: CLINIC | Age: 28
End: 2018-11-26

## 2018-11-26 VITALS — SYSTOLIC BLOOD PRESSURE: 147 MMHG | WEIGHT: 228 LBS | DIASTOLIC BLOOD PRESSURE: 92 MMHG | BODY MASS INDEX: 35.71 KG/M2

## 2018-11-26 DIAGNOSIS — O10.919 HTN IN PREGNANCY, CHRONIC: Primary | ICD-10-CM

## 2018-11-26 DIAGNOSIS — O14.93 PRE-ECLAMPSIA IN THIRD TRIMESTER: Primary | ICD-10-CM

## 2018-11-26 PROCEDURE — 76819 FETAL BIOPHYS PROFIL W/O NST: CPT | Performed by: OBSTETRICS & GYNECOLOGY

## 2018-11-26 PROCEDURE — 0502F SUBSEQUENT PRENATAL CARE: CPT | Performed by: OBSTETRICS & GYNECOLOGY

## 2018-11-26 NOTE — PATIENT INSTRUCTIONS
Please call if:   Systolic >159 or diastolic >109 for two readings 20 minutes apart   OR   Systolics >50% in 150's or diastolics >50% in 100s over a day    Or call with headache/spots in your vision/ right upper abdominal pain

## 2018-11-26 NOTE — TELEPHONE ENCOUNTER
Pt called, requesting an appt to be seen today. Pt was released from the hospital on Saturday and was told to follow up before the end of the week. Where can this pt be put? Please advise.       Pt callback: [phone]

## 2018-11-26 NOTE — PROGRESS NOTES
Chief Complaint   Patient presents with   • Hypertension      Callie Parks is a 28 y.o.  at 34w4d   Reports no headache/vision changes/RUQ pain. Reports her blood pressure prior to coming here was 140/90s.  She is staking labetalol 400mg BID and Procardia 30XL in the middle of the night.  She has felt normal fetal movements, no vaginal bleeding or LOF.  /92   Wt 103 kg (228 lb)   LMP 2018 (Exact Date)   BMI 35.71 kg/m²    Abd: gravid, nontender  See OB Flowsheet    ASSESSMENT:   1. IUP at 34w4d   2. Chronic hypertension with superimposed preeclampsia without severe features  PLAN:  BPP today   Patient does not have persistent severe blood pressures. I reviewed her blood pressures she has taken since discharge from the hospital and they are mild outside of one 160 systolic prior to her medication.  She denies any headache/vision changes/RUQ pain.  I reviewed with patient that I would have a very low threshold to recommend inpatient mangement.  She desires to stay outpatient if possible. Given that she is diligent in taking her blood pressures and compliant with weekly labs and twice weekly BPP I think this is reasonable, but if severe blood pressures were to arise at home or if she had a headache/spots in her vision/RUQ pain or any other concerning finding, she should go to the hospital immediately.  Reviewed that I would recommend calling if her blood pressures were greater than 50% in the 150s systolic or 100s diastolic.  She will take blood pressures at least 3 times per day.  We reviewed the risks of severe preeclampsia and eclampsia. Daily fetal kick counts.  Follow up on Friday.    Return in about 1 week (around 12/3/2018) for BPP twice weekly and weekly office visit.  Orders Placed This Encounter   Procedures   • CBC (No Diff)     Order Specific Question:   [facility] Has the patient fasted?     Answer:   No   • Comprehensive Metabolic Panel     Order Specific Question:   [facility] Has  the patient fasted?     Answer:   No   • Protein, Urine, 24 Hour - Urine, Clean Catch   • Creatinine Clearance, Urine, 24 Hour - Urine, Clean Catch

## 2018-11-26 NOTE — TELEPHONE ENCOUNTER
I do need to see her today and she needs twice weekly BPP - can she get a BPP and see me at 12:30?

## 2018-11-27 LAB
ALBUMIN SERPL-MCNC: 3.1 G/DL (ref 3.5–5.2)
ALBUMIN/GLOB SERPL: 1 G/DL
ALP SERPL-CCNC: 150 U/L (ref 39–117)
ALT SERPL-CCNC: 22 U/L (ref 1–33)
AST SERPL-CCNC: 23 U/L (ref 1–32)
BILIRUB SERPL-MCNC: 0.2 MG/DL (ref 0.1–1.2)
BUN SERPL-MCNC: 10 MG/DL (ref 6–20)
BUN/CREAT SERPL: 15.9 (ref 7–25)
CALCIUM SERPL-MCNC: 9.2 MG/DL (ref 8.6–10.5)
CHLORIDE SERPL-SCNC: 104 MMOL/L (ref 98–107)
CO2 SERPL-SCNC: 22.8 MMOL/L (ref 22–29)
CREAT SERPL-MCNC: 0.63 MG/DL (ref 0.57–1)
ERYTHROCYTE [DISTWIDTH] IN BLOOD BY AUTOMATED COUNT: 14.4 % (ref 11.7–13)
GLOBULIN SER CALC-MCNC: 3 GM/DL
GLUCOSE SERPL-MCNC: 109 MG/DL (ref 65–99)
HCT VFR BLD AUTO: 39.1 % (ref 35.6–45.5)
HGB BLD-MCNC: 13 G/DL (ref 11.9–15.5)
MCH RBC QN AUTO: 31.1 PG (ref 26.9–32)
MCHC RBC AUTO-ENTMCNC: 33.2 G/DL (ref 32.4–36.3)
MCV RBC AUTO: 93.5 FL (ref 80.5–98.2)
PLATELET # BLD AUTO: 174 10*3/MM3 (ref 140–500)
POTASSIUM SERPL-SCNC: 4.1 MMOL/L (ref 3.5–5.2)
PROT SERPL-MCNC: 6.1 G/DL (ref 6–8.5)
RBC # BLD AUTO: 4.18 10*6/MM3 (ref 3.9–5.2)
SODIUM SERPL-SCNC: 139 MMOL/L (ref 136–145)
WBC # BLD AUTO: 10.66 10*3/MM3 (ref 4.5–10.7)

## 2018-11-30 ENCOUNTER — ROUTINE PRENATAL (OUTPATIENT)
Dept: OBSTETRICS AND GYNECOLOGY | Facility: CLINIC | Age: 28
End: 2018-11-30

## 2018-11-30 ENCOUNTER — PROCEDURE VISIT (OUTPATIENT)
Dept: OBSTETRICS AND GYNECOLOGY | Facility: CLINIC | Age: 28
End: 2018-11-30

## 2018-11-30 VITALS — BODY MASS INDEX: 35.4 KG/M2 | WEIGHT: 226 LBS | SYSTOLIC BLOOD PRESSURE: 150 MMHG | DIASTOLIC BLOOD PRESSURE: 96 MMHG

## 2018-11-30 DIAGNOSIS — O10.919 HTN IN PREGNANCY, CHRONIC: Primary | ICD-10-CM

## 2018-11-30 DIAGNOSIS — Z34.90 PRENATAL CARE, ANTEPARTUM: Primary | ICD-10-CM

## 2018-11-30 PROCEDURE — 76819 FETAL BIOPHYS PROFIL W/O NST: CPT | Performed by: OBSTETRICS & GYNECOLOGY

## 2018-11-30 PROCEDURE — 0502F SUBSEQUENT PRENATAL CARE: CPT | Performed by: OBSTETRICS & GYNECOLOGY

## 2018-11-30 NOTE — PROGRESS NOTES
Chief Complaint   Patient presents with   • Routine Prenatal Visit      Callie Parks is a 28 y.o.  at 35w1d   Patient denies headache, right upper quadrant pain, vision changes.  She reports that she is doing well overall.  Her blood pressures at home since yesterday have been: 131/75 (315AM), 138/78 (845), 122/84 (12PM), 127/82 (1600), 132/97 (2000), 126/86 (2300), 133/76 (0300), 140/94 (0920), 135/87 (1130).  Notes normal fetal movements  /96   Wt 103 kg (226 lb)   LMP 2018 (Exact Date)   BMI 35.40 kg/m²    Abd: gravid, nontender  See OB Flowsheet  ASSESSMENT:   1. IUP at 35w1d   2. Preeclampsia without severe features superimposed on chronic hypertension  PLAN:  GBS today  Re-iterated severe features and reasons for delivery.  Discussed low threshold to come to ED with severe blood pressure, severe symptoms, decreased fetal movements, or other concerning findings  GBS today.  Reviewed common symptoms of the third trimester.  Counseled on labor precautions and anticipated fetal movements.  Reviewed kick counts.  Patient is aware of the location of L&D.    Pt in agreement with plan for outpatient management.  Repeat BPP and labs on Monday, BPP and OB visit Friday    No Follow-up on file.  Orders Placed This Encounter   Procedures   • Group B Streptococcus Culture - Swab, Vaginal/Rectum

## 2018-12-01 LAB
CREAT 24H UR-MRATE: 1.82 G/24 HR (ref 0.7–1.6)
CREAT CL 24H UR+SERPL-VRATE: 213.9 ML/MIN (ref 74.3–113.9)
CREAT SERPL-MCNC: 0.59 MG/DL (ref 0.57–1)
CREAT UR-MCNC: 79 MG/DL
PROT 24H UR-MRATE: 598 MG/24HOURS
PROT UR-MCNC: 26 MG/DL

## 2018-12-03 ENCOUNTER — PROCEDURE VISIT (OUTPATIENT)
Dept: OBSTETRICS AND GYNECOLOGY | Facility: CLINIC | Age: 28
End: 2018-12-03

## 2018-12-03 DIAGNOSIS — O10.919 CHRONIC HYPERTENSION AFFECTING PREGNANCY: ICD-10-CM

## 2018-12-03 DIAGNOSIS — O10.919 HTN IN PREGNANCY, CHRONIC: Primary | ICD-10-CM

## 2018-12-03 LAB
ERYTHROCYTE [DISTWIDTH] IN BLOOD BY AUTOMATED COUNT: 14 % (ref 11.7–13)
HCT VFR BLD AUTO: 40.4 % (ref 35.6–45.5)
HGB BLD-MCNC: 12.7 G/DL (ref 11.9–15.5)
MCH RBC QN AUTO: 30.3 PG (ref 26.9–32)
MCHC RBC AUTO-ENTMCNC: 31.4 G/DL (ref 32.4–36.3)
MCV RBC AUTO: 96.4 FL (ref 80.5–98.2)
PLATELET # BLD AUTO: 192 10*3/MM3 (ref 140–500)
RBC # BLD AUTO: 4.19 10*6/MM3 (ref 3.9–5.2)
WBC # BLD AUTO: 9.33 10*3/MM3 (ref 4.5–10.7)

## 2018-12-03 PROCEDURE — 76819 FETAL BIOPHYS PROFIL W/O NST: CPT | Performed by: OBSTETRICS & GYNECOLOGY

## 2018-12-04 LAB
ALBUMIN SERPL-MCNC: 3.8 G/DL (ref 3.5–5.2)
ALBUMIN/GLOB SERPL: 1.5 G/DL
ALP SERPL-CCNC: 169 U/L (ref 39–117)
ALT SERPL-CCNC: 30 U/L (ref 1–33)
AST SERPL-CCNC: 26 U/L (ref 1–32)
BILIRUB SERPL-MCNC: 0.2 MG/DL (ref 0.1–1.2)
BUN SERPL-MCNC: 11 MG/DL (ref 6–20)
BUN/CREAT SERPL: 17.7 (ref 7–25)
CALCIUM SERPL-MCNC: 9.3 MG/DL (ref 8.6–10.5)
CHLORIDE SERPL-SCNC: 106 MMOL/L (ref 98–107)
CO2 SERPL-SCNC: 22.7 MMOL/L (ref 22–29)
CREAT SERPL-MCNC: 0.62 MG/DL (ref 0.57–1)
GLOBULIN SER CALC-MCNC: 2.6 GM/DL
GLUCOSE SERPL-MCNC: 73 MG/DL (ref 65–99)
POTASSIUM SERPL-SCNC: 4.4 MMOL/L (ref 3.5–5.2)
PROT SERPL-MCNC: 6.4 G/DL (ref 6–8.5)
SODIUM SERPL-SCNC: 139 MMOL/L (ref 136–145)
TSH SERPL DL<=0.005 MIU/L-ACNC: 3.31 MIU/ML (ref 0.27–4.2)

## 2018-12-05 LAB — B-HEM STREP SPEC QL CULT: NEGATIVE

## 2018-12-06 ENCOUNTER — ROUTINE PRENATAL (OUTPATIENT)
Dept: OBSTETRICS AND GYNECOLOGY | Facility: CLINIC | Age: 28
End: 2018-12-06

## 2018-12-06 ENCOUNTER — PROCEDURE VISIT (OUTPATIENT)
Dept: OBSTETRICS AND GYNECOLOGY | Facility: CLINIC | Age: 28
End: 2018-12-06

## 2018-12-06 VITALS — BODY MASS INDEX: 35.08 KG/M2 | SYSTOLIC BLOOD PRESSURE: 134 MMHG | WEIGHT: 224 LBS | DIASTOLIC BLOOD PRESSURE: 100 MMHG

## 2018-12-06 DIAGNOSIS — I10 CHRONIC HYPERTENSION: Primary | ICD-10-CM

## 2018-12-06 DIAGNOSIS — O10.919 HTN IN PREGNANCY, CHRONIC: Primary | ICD-10-CM

## 2018-12-06 PROCEDURE — 76816 OB US FOLLOW-UP PER FETUS: CPT | Performed by: OBSTETRICS & GYNECOLOGY

## 2018-12-06 PROCEDURE — 76819 FETAL BIOPHYS PROFIL W/O NST: CPT | Performed by: OBSTETRICS & GYNECOLOGY

## 2018-12-06 PROCEDURE — 0502F SUBSEQUENT PRENATAL CARE: CPT | Performed by: OBSTETRICS & GYNECOLOGY

## 2018-12-06 NOTE — PROGRESS NOTES
Chief Complaint   Patient presents with   • Routine Prenatal Visit      Callie Parks is a 28 y.o.  at 36w0d   Denies headache/vision changes/RUQ pain.  Denies vaginal bleeding/LOF/ctx  Notes normal fetal movements  Blood pressures at home, max 140's/90s immediately prior to labetalol    /100   Wt 102 kg (224 lb)   LMP 2018 (Exact Date)   BMI 35.08 kg/m²    Abd: gravid, nontender  See OB Flowsheet    ASSESSMENT:   1. IUP at 36w0d   2. Superimposed preeclampsia without severe features    PLAN:  Labs, BPP, visit Monday  To go to hospital with severe signs such as headache/vision changes/RUQ pain.  Reviewed common symptoms of the third trimester.  Counseled on labor precautions and anticipated fetal movements.  Reviewed kick counts.  Patient is aware of the location of L&D.        Return for BPP on Monday and labs and appointment - okay to overbook.  Orders Placed This Encounter   Procedures   • Comprehensive Metabolic Panel   • CBC (No Diff)

## 2018-12-10 ENCOUNTER — ROUTINE PRENATAL (OUTPATIENT)
Dept: OBSTETRICS AND GYNECOLOGY | Facility: CLINIC | Age: 28
End: 2018-12-10

## 2018-12-10 ENCOUNTER — PROCEDURE VISIT (OUTPATIENT)
Dept: OBSTETRICS AND GYNECOLOGY | Facility: CLINIC | Age: 28
End: 2018-12-10

## 2018-12-10 VITALS — WEIGHT: 229 LBS | BODY MASS INDEX: 35.87 KG/M2 | DIASTOLIC BLOOD PRESSURE: 102 MMHG | SYSTOLIC BLOOD PRESSURE: 145 MMHG

## 2018-12-10 DIAGNOSIS — O10.919 CHRONIC HYPERTENSION AFFECTING PREGNANCY: Primary | ICD-10-CM

## 2018-12-10 DIAGNOSIS — O99.213 OBESITY COMPLICATING PREGNANCY IN THIRD TRIMESTER: ICD-10-CM

## 2018-12-10 DIAGNOSIS — O14.93 PRE-ECLAMPSIA IN THIRD TRIMESTER: Primary | ICD-10-CM

## 2018-12-10 PROCEDURE — 0502F SUBSEQUENT PRENATAL CARE: CPT | Performed by: OBSTETRICS & GYNECOLOGY

## 2018-12-10 PROCEDURE — 76819 FETAL BIOPHYS PROFIL W/O NST: CPT | Performed by: OBSTETRICS & GYNECOLOGY

## 2018-12-10 RX ORDER — OXYTOCIN 10 [USP'U]/ML
125 INJECTION, SOLUTION INTRAMUSCULAR; INTRAVENOUS CONTINUOUS PRN
Status: CANCELLED | OUTPATIENT
Start: 2018-12-10

## 2018-12-10 RX ORDER — LIDOCAINE HYDROCHLORIDE 10 MG/ML
5 INJECTION, SOLUTION EPIDURAL; INFILTRATION; INTRACAUDAL; PERINEURAL AS NEEDED
Status: CANCELLED | OUTPATIENT
Start: 2018-12-10

## 2018-12-10 RX ORDER — SODIUM CHLORIDE 0.9 % (FLUSH) 0.9 %
3 SYRINGE (ML) INJECTION EVERY 12 HOURS SCHEDULED
Status: CANCELLED | OUTPATIENT
Start: 2018-12-10

## 2018-12-10 RX ORDER — ONDANSETRON 4 MG/1
4 TABLET, FILM COATED ORAL EVERY 6 HOURS PRN
Status: CANCELLED | OUTPATIENT
Start: 2018-12-10

## 2018-12-10 RX ORDER — SODIUM CHLORIDE 0.9 % (FLUSH) 0.9 %
1-10 SYRINGE (ML) INJECTION AS NEEDED
Status: CANCELLED | OUTPATIENT
Start: 2018-12-10

## 2018-12-10 RX ORDER — ONDANSETRON 2 MG/ML
4 INJECTION INTRAMUSCULAR; INTRAVENOUS EVERY 6 HOURS PRN
Status: CANCELLED | OUTPATIENT
Start: 2018-12-10

## 2018-12-10 RX ORDER — OXYTOCIN 10 [USP'U]/ML
999 INJECTION, SOLUTION INTRAMUSCULAR; INTRAVENOUS ONCE
Status: CANCELLED | OUTPATIENT
Start: 2018-12-10

## 2018-12-10 RX ORDER — CARBOPROST TROMETHAMINE 250 UG/ML
250 INJECTION, SOLUTION INTRAMUSCULAR AS NEEDED
Status: CANCELLED | OUTPATIENT
Start: 2018-12-10

## 2018-12-10 RX ORDER — MISOPROSTOL 100 UG/1
800 TABLET ORAL AS NEEDED
Status: CANCELLED | OUTPATIENT
Start: 2018-12-10

## 2018-12-10 RX ORDER — OXYTOCIN 10 [USP'U]/ML
250 INJECTION, SOLUTION INTRAMUSCULAR; INTRAVENOUS CONTINUOUS
Status: CANCELLED | OUTPATIENT
Start: 2018-12-10 | End: 2018-12-10

## 2018-12-10 RX ORDER — METHYLERGONOVINE MALEATE 0.2 MG/ML
200 INJECTION INTRAVENOUS ONCE AS NEEDED
Status: CANCELLED | OUTPATIENT
Start: 2018-12-10

## 2018-12-10 RX ORDER — ONDANSETRON 4 MG/1
4 TABLET, ORALLY DISINTEGRATING ORAL EVERY 6 HOURS PRN
Status: CANCELLED | OUTPATIENT
Start: 2018-12-10

## 2018-12-10 NOTE — PROGRESS NOTES
Chief Complaint   Patient presents with   • Routine Prenatal Visit      Callie Parks is a 28 y.o.  at 36w4d   Denies hypertension, vision changes, right upper quadrant pain.  Notes normal fetal movements.  Has been taking frequent blood pressures at home and the highest is 144/93 right before her labetalol was due.    BP (!) 145/102   Wt 104 kg (229 lb)   LMP 2018 (Exact Date)   BMI 35.87 kg/m²    Abd: gravid, nontender  See OB Flowsheet    ASSESSMENT:   1. IUP at 36w4d   2. Superimposed preeclampsia with severe features  PLAN:  Preeclampsia precautiosn reviewed  BPP   Vertex  Reviewed common symptoms of the third trimester.  Counseled on labor precautions and anticipated fetal movements.  Reviewed kick counts.  Patient is aware of the location of L&D.    Plan for IOL Thursday night with cervidil, orders placed.        No orders of the defined types were placed in this encounter.

## 2018-12-11 LAB
ERYTHROCYTE [DISTWIDTH] IN BLOOD BY AUTOMATED COUNT: 15.2 % (ref 11.7–13)
HCT VFR BLD AUTO: 43.4 % (ref 35.6–45.5)
HGB BLD-MCNC: 13.3 G/DL (ref 11.9–15.5)
MCH RBC QN AUTO: 31.2 PG (ref 26.9–32)
MCHC RBC AUTO-ENTMCNC: 30.6 G/DL (ref 32.4–36.3)
MCV RBC AUTO: 101.9 FL (ref 80.5–98.2)
PLATELET # BLD AUTO: 149 10*3/MM3 (ref 140–500)
RBC # BLD AUTO: 4.26 10*6/MM3 (ref 3.9–5.2)
WBC # BLD AUTO: 9.39 10*3/MM3 (ref 4.5–10.7)

## 2018-12-12 LAB
ALBUMIN SERPL-MCNC: 3.5 G/DL (ref 3.5–5.2)
ALBUMIN/GLOB SERPL: 1.3 G/DL
ALP SERPL-CCNC: 181 U/L (ref 39–117)
ALT SERPL-CCNC: 32 U/L (ref 1–33)
AST SERPL-CCNC: 30 U/L (ref 1–32)
BILIRUB SERPL-MCNC: 0.2 MG/DL (ref 0.1–1.2)
BUN SERPL-MCNC: 12 MG/DL (ref 6–20)
BUN/CREAT SERPL: 17.4 (ref 7–25)
CALCIUM SERPL-MCNC: 9.1 MG/DL (ref 8.6–10.5)
CHLORIDE SERPL-SCNC: 107 MMOL/L (ref 98–107)
CO2 SERPL-SCNC: 21.5 MMOL/L (ref 22–29)
CREAT SERPL-MCNC: 0.69 MG/DL (ref 0.57–1)
GLOBULIN SER CALC-MCNC: 2.6 GM/DL
GLUCOSE SERPL-MCNC: 84 MG/DL (ref 65–99)
POTASSIUM SERPL-SCNC: 4.6 MMOL/L (ref 3.5–5.2)
PROT SERPL-MCNC: 6.1 G/DL (ref 6–8.5)
SODIUM SERPL-SCNC: 141 MMOL/L (ref 136–145)

## 2018-12-13 ENCOUNTER — APPOINTMENT (OUTPATIENT)
Dept: LABOR AND DELIVERY | Facility: HOSPITAL | Age: 28
End: 2018-12-13

## 2018-12-13 ENCOUNTER — ANESTHESIA EVENT (OUTPATIENT)
Dept: LABOR AND DELIVERY | Facility: HOSPITAL | Age: 28
End: 2018-12-13

## 2018-12-13 ENCOUNTER — HOSPITAL ENCOUNTER (INPATIENT)
Facility: HOSPITAL | Age: 28
LOS: 6 days | Discharge: HOME | End: 2018-12-19
Attending: OBSTETRICS & GYNECOLOGY | Admitting: OBSTETRICS & GYNECOLOGY

## 2018-12-13 ENCOUNTER — ANESTHESIA (OUTPATIENT)
Dept: LABOR AND DELIVERY | Facility: HOSPITAL | Age: 28
End: 2018-12-13

## 2018-12-13 DIAGNOSIS — O14.93 PRE-ECLAMPSIA IN THIRD TRIMESTER: ICD-10-CM

## 2018-12-13 PROBLEM — O14.90 PREECLAMPSIA: Status: ACTIVE | Noted: 2018-12-13

## 2018-12-13 LAB
ABO GROUP BLD: NORMAL
ALBUMIN SERPL-MCNC: 3.2 G/DL (ref 3.5–5.2)
ALBUMIN/GLOB SERPL: 1.1 G/DL
ALP SERPL-CCNC: 168 U/L (ref 39–117)
ALT SERPL W P-5'-P-CCNC: 28 U/L (ref 1–33)
ANION GAP SERPL CALCULATED.3IONS-SCNC: 12.7 MMOL/L
AST SERPL-CCNC: 31 U/L (ref 1–32)
BILIRUB SERPL-MCNC: 0.2 MG/DL (ref 0.1–1.2)
BLD GP AB SCN SERPL QL: NEGATIVE
BUN BLD-MCNC: 13 MG/DL (ref 6–20)
BUN/CREAT SERPL: 25 (ref 7–25)
CALCIUM SPEC-SCNC: 9 MG/DL (ref 8.6–10.5)
CHLORIDE SERPL-SCNC: 107 MMOL/L (ref 98–107)
CO2 SERPL-SCNC: 20.3 MMOL/L (ref 22–29)
CREAT BLD-MCNC: 0.52 MG/DL (ref 0.57–1)
DEPRECATED RDW RBC AUTO: 44.6 FL (ref 37–54)
ERYTHROCYTE [DISTWIDTH] IN BLOOD BY AUTOMATED COUNT: 13.5 % (ref 11.7–13)
EXPIRATION DATE: ABNORMAL
GFR SERPL CREATININE-BSD FRML MDRD: 143 ML/MIN/1.73
GLOBULIN UR ELPH-MCNC: 3 GM/DL
GLUCOSE BLD-MCNC: 95 MG/DL (ref 65–99)
HCT VFR BLD AUTO: 36.7 % (ref 35.6–45.5)
HGB BLD-MCNC: 12.7 G/DL (ref 11.9–15.5)
Lab: ABNORMAL
MCH RBC QN AUTO: 31.1 PG (ref 26.9–32)
MCHC RBC AUTO-ENTMCNC: 34.6 G/DL (ref 32.4–36.3)
MCV RBC AUTO: 90 FL (ref 80.5–98.2)
PLATELET # BLD AUTO: 135 10*3/MM3 (ref 140–500)
PMV BLD AUTO: 13.6 FL (ref 6–12)
POTASSIUM BLD-SCNC: 4 MMOL/L (ref 3.5–5.2)
PROT SERPL-MCNC: 6.2 G/DL (ref 6–8.5)
PROT UR STRIP-MCNC: ABNORMAL MG/DL
RBC # BLD AUTO: 4.08 10*6/MM3 (ref 3.9–5.2)
RH BLD: POSITIVE
SODIUM BLD-SCNC: 140 MMOL/L (ref 136–145)
T&S EXPIRATION DATE: NORMAL
WBC NRBC COR # BLD: 8.64 10*3/MM3 (ref 4.5–10.7)

## 2018-12-13 PROCEDURE — 86900 BLOOD TYPING SEROLOGIC ABO: CPT | Performed by: OBSTETRICS & GYNECOLOGY

## 2018-12-13 PROCEDURE — 86901 BLOOD TYPING SEROLOGIC RH(D): CPT | Performed by: OBSTETRICS & GYNECOLOGY

## 2018-12-13 PROCEDURE — 86923 COMPATIBILITY TEST ELECTRIC: CPT

## 2018-12-13 PROCEDURE — 85027 COMPLETE CBC AUTOMATED: CPT | Performed by: OBSTETRICS & GYNECOLOGY

## 2018-12-13 PROCEDURE — 81002 URINALYSIS NONAUTO W/O SCOPE: CPT | Performed by: OBSTETRICS & GYNECOLOGY

## 2018-12-13 PROCEDURE — 86850 RBC ANTIBODY SCREEN: CPT | Performed by: OBSTETRICS & GYNECOLOGY

## 2018-12-13 PROCEDURE — 3E0P7VZ INTRODUCTION OF HORMONE INTO FEMALE REPRODUCTIVE, VIA NATURAL OR ARTIFICIAL OPENING: ICD-10-PCS | Performed by: OBSTETRICS & GYNECOLOGY

## 2018-12-13 PROCEDURE — 80053 COMPREHEN METABOLIC PANEL: CPT | Performed by: OBSTETRICS & GYNECOLOGY

## 2018-12-13 RX ORDER — EPHEDRINE SULFATE 50 MG/ML
5 INJECTION, SOLUTION INTRAVENOUS AS NEEDED
Status: DISCONTINUED | OUTPATIENT
Start: 2018-12-13 | End: 2018-12-15

## 2018-12-13 RX ORDER — LIDOCAINE HYDROCHLORIDE 10 MG/ML
5 INJECTION, SOLUTION EPIDURAL; INFILTRATION; INTRACAUDAL; PERINEURAL AS NEEDED
Status: DISCONTINUED | OUTPATIENT
Start: 2018-12-13 | End: 2018-12-15

## 2018-12-13 RX ORDER — ONDANSETRON 4 MG/1
4 TABLET, ORALLY DISINTEGRATING ORAL EVERY 6 HOURS PRN
Status: DISCONTINUED | OUTPATIENT
Start: 2018-12-13 | End: 2018-12-16 | Stop reason: HOSPADM

## 2018-12-13 RX ORDER — LABETALOL HYDROCHLORIDE 5 MG/ML
20 INJECTION, SOLUTION INTRAVENOUS ONCE
Status: COMPLETED | OUTPATIENT
Start: 2018-12-13 | End: 2018-12-14

## 2018-12-13 RX ORDER — ONDANSETRON 2 MG/ML
4 INJECTION INTRAMUSCULAR; INTRAVENOUS ONCE AS NEEDED
Status: DISCONTINUED | OUTPATIENT
Start: 2018-12-13 | End: 2018-12-15

## 2018-12-13 RX ORDER — SODIUM CHLORIDE 0.9 % (FLUSH) 0.9 %
3 SYRINGE (ML) INJECTION EVERY 12 HOURS SCHEDULED
Status: DISCONTINUED | OUTPATIENT
Start: 2018-12-13 | End: 2018-12-13

## 2018-12-13 RX ORDER — DIPHENHYDRAMINE HYDROCHLORIDE 50 MG/ML
12.5 INJECTION INTRAMUSCULAR; INTRAVENOUS EVERY 8 HOURS PRN
Status: DISCONTINUED | OUTPATIENT
Start: 2018-12-13 | End: 2018-12-15

## 2018-12-13 RX ORDER — SODIUM CHLORIDE, SODIUM LACTATE, POTASSIUM CHLORIDE, CALCIUM CHLORIDE 600; 310; 30; 20 MG/100ML; MG/100ML; MG/100ML; MG/100ML
125 INJECTION, SOLUTION INTRAVENOUS CONTINUOUS
Status: DISCONTINUED | OUTPATIENT
Start: 2018-12-14 | End: 2018-12-19 | Stop reason: HOSPADM

## 2018-12-13 RX ORDER — ONDANSETRON 4 MG/1
4 TABLET, FILM COATED ORAL EVERY 6 HOURS PRN
Status: DISCONTINUED | OUTPATIENT
Start: 2018-12-13 | End: 2018-12-16 | Stop reason: HOSPADM

## 2018-12-13 RX ORDER — ONDANSETRON 2 MG/ML
4 INJECTION INTRAMUSCULAR; INTRAVENOUS EVERY 6 HOURS PRN
Status: DISCONTINUED | OUTPATIENT
Start: 2018-12-13 | End: 2018-12-16 | Stop reason: HOSPADM

## 2018-12-13 RX ORDER — FAMOTIDINE 10 MG/ML
20 INJECTION, SOLUTION INTRAVENOUS ONCE AS NEEDED
Status: COMPLETED | OUTPATIENT
Start: 2018-12-13 | End: 2018-12-15

## 2018-12-13 RX ORDER — SODIUM CHLORIDE 0.9 % (FLUSH) 0.9 %
1-10 SYRINGE (ML) INJECTION AS NEEDED
Status: DISCONTINUED | OUTPATIENT
Start: 2018-12-13 | End: 2018-12-13

## 2018-12-13 RX ADMIN — DINOPROSTONE 10 MG: 10 INSERT VAGINAL at 22:07

## 2018-12-13 RX ADMIN — SODIUM CHLORIDE, POTASSIUM CHLORIDE, SODIUM LACTATE AND CALCIUM CHLORIDE 125 ML/HR: 600; 310; 30; 20 INJECTION, SOLUTION INTRAVENOUS at 21:30

## 2018-12-14 PROCEDURE — 88307 TISSUE EXAM BY PATHOLOGIST: CPT

## 2018-12-14 PROCEDURE — 10907ZC DRAINAGE OF AMNIOTIC FLUID, THERAPEUTIC FROM PRODUCTS OF CONCEPTION, VIA NATURAL OR ARTIFICIAL OPENING: ICD-10-PCS | Performed by: OBSTETRICS & GYNECOLOGY

## 2018-12-14 PROCEDURE — 3E033VJ INTRODUCTION OF OTHER HORMONE INTO PERIPHERAL VEIN, PERCUTANEOUS APPROACH: ICD-10-PCS | Performed by: OBSTETRICS & GYNECOLOGY

## 2018-12-14 PROCEDURE — C1755 CATHETER, INTRASPINAL: HCPCS

## 2018-12-14 PROCEDURE — C1755 CATHETER, INTRASPINAL: HCPCS | Performed by: ANESTHESIOLOGY

## 2018-12-14 RX ORDER — LABETALOL HYDROCHLORIDE 5 MG/ML
INJECTION, SOLUTION INTRAVENOUS
Status: COMPLETED
Start: 2018-12-14 | End: 2018-12-14

## 2018-12-14 RX ORDER — LABETALOL 200 MG/1
400 TABLET, FILM COATED ORAL 2 TIMES DAILY
Status: DISCONTINUED | OUTPATIENT
Start: 2018-12-14 | End: 2018-12-14

## 2018-12-14 RX ORDER — OXYTOCIN-SODIUM CHLORIDE 0.9% IV SOLN 30 UNIT/500ML 30-0.9/5 UT/ML-%
2-20 SOLUTION INTRAVENOUS
Status: DISCONTINUED | OUTPATIENT
Start: 2018-12-14 | End: 2018-12-15

## 2018-12-14 RX ORDER — LABETALOL 200 MG/1
400 TABLET, FILM COATED ORAL 2 TIMES DAILY
Status: DISCONTINUED | OUTPATIENT
Start: 2018-12-14 | End: 2018-12-19 | Stop reason: HOSPADM

## 2018-12-14 RX ORDER — ONDANSETRON 2 MG/ML
4 INJECTION INTRAMUSCULAR; INTRAVENOUS ONCE AS NEEDED
Status: COMPLETED | OUTPATIENT
Start: 2018-12-14 | End: 2018-12-15

## 2018-12-14 RX ORDER — DIPHENHYDRAMINE HYDROCHLORIDE 50 MG/ML
12.5 INJECTION INTRAMUSCULAR; INTRAVENOUS EVERY 8 HOURS PRN
Status: DISCONTINUED | OUTPATIENT
Start: 2018-12-14 | End: 2018-12-16 | Stop reason: HOSPADM

## 2018-12-14 RX ORDER — EPHEDRINE SULFATE 50 MG/ML
5 INJECTION, SOLUTION INTRAVENOUS AS NEEDED
Status: DISCONTINUED | OUTPATIENT
Start: 2018-12-14 | End: 2018-12-16 | Stop reason: HOSPADM

## 2018-12-14 RX ORDER — LABETALOL 200 MG/1
200 TABLET, FILM COATED ORAL 2 TIMES DAILY
Status: DISCONTINUED | OUTPATIENT
Start: 2018-12-14 | End: 2018-12-14

## 2018-12-14 RX ORDER — LABETALOL HYDROCHLORIDE 5 MG/ML
80 INJECTION, SOLUTION INTRAVENOUS ONCE
Status: COMPLETED | OUTPATIENT
Start: 2018-12-14 | End: 2018-12-14

## 2018-12-14 RX ORDER — FAMOTIDINE 10 MG/ML
20 INJECTION, SOLUTION INTRAVENOUS ONCE AS NEEDED
Status: DISCONTINUED | OUTPATIENT
Start: 2018-12-14 | End: 2018-12-16 | Stop reason: HOSPADM

## 2018-12-14 RX ORDER — NIFEDIPINE 30 MG/1
30 TABLET, EXTENDED RELEASE ORAL
Status: DISCONTINUED | OUTPATIENT
Start: 2018-12-14 | End: 2018-12-15

## 2018-12-14 RX ORDER — LABETALOL HYDROCHLORIDE 5 MG/ML
40 INJECTION, SOLUTION INTRAVENOUS ONCE
Status: COMPLETED | OUTPATIENT
Start: 2018-12-14 | End: 2018-12-14

## 2018-12-14 RX ADMIN — LABETALOL HYDROCHLORIDE 20 MG: 5 INJECTION, SOLUTION INTRAVENOUS at 03:44

## 2018-12-14 RX ADMIN — Medication 8 ML/HR: at 12:05

## 2018-12-14 RX ADMIN — SODIUM CHLORIDE, POTASSIUM CHLORIDE, SODIUM LACTATE AND CALCIUM CHLORIDE 125 ML/HR: 600; 310; 30; 20 INJECTION, SOLUTION INTRAVENOUS at 04:21

## 2018-12-14 RX ADMIN — NIFEDIPINE 30 MG: 30 TABLET, FILM COATED, EXTENDED RELEASE ORAL at 08:54

## 2018-12-14 RX ADMIN — LABETALOL HYDROCHLORIDE 40 MG: 5 INJECTION, SOLUTION INTRAVENOUS at 04:16

## 2018-12-14 RX ADMIN — LABETALOL HCL 400 MG: 200 TABLET, FILM COATED ORAL at 21:02

## 2018-12-14 RX ADMIN — LABETALOL HCL 400 MG: 200 TABLET, FILM COATED ORAL at 15:07

## 2018-12-14 RX ADMIN — SODIUM CHLORIDE, POTASSIUM CHLORIDE, SODIUM LACTATE AND CALCIUM CHLORIDE 125 ML/HR: 600; 310; 30; 20 INJECTION, SOLUTION INTRAVENOUS at 12:01

## 2018-12-14 RX ADMIN — OXYTOCIN 2 MILLI-UNITS/MIN: 10 INJECTION, SOLUTION INTRAMUSCULAR; INTRAVENOUS at 11:39

## 2018-12-14 RX ADMIN — LABETALOL HYDROCHLORIDE 80 MG: 5 INJECTION, SOLUTION INTRAVENOUS at 05:55

## 2018-12-14 NOTE — H&P
[facility]  Obstetric History and Physical    Chief Complaint   Patient presents with   • Scheduled Induction     Cervidil induction, schedulling form on chart, no leakage of fluid reported, positive fetal movement, no cxn       Subjective     Patient is a 28 y.o. female  currently at 37w1d, who presents for induction of labor for superimposed preeclampsia without severe features.  She denies headache/vision changes/RUQ pain.  Patient notes normal fetal movement.  She has been having frequent cramping since placement of the Cervidil at 10 PM.    This pregnancy has been complicated by chronic hypertension with superimposed preeclampsia developing in her third trimester, subclinical hypothyroidism with normal last TSH (on Synthroid, started at 30 weeks).        Prenatal Information:  Prenatal Results     Initial Prenatal Labs     Test Value Reference Range Date Time    Hemoglobin 14.2 g/dL 11.1 - 15.9 g/dL 18 165    Hematocrit 41.8 % 34.0 - 46.6 % 18 165    Platelets 135 10*3/mm3 140 - 500 10*3/mm3 18 214    Rubella IgG <0.90 index Immune >0.99 index 18 1659    Hepatitis B SAg Negative  Negative 18 165    Hepatitis C Ab        RPR Non Reactive  Non Reactive 18 1659    ABO O   18 214    Rh Positive   18    Antibody Screen Negative  Negative 18 165    HIV Non Reactive  Non Reactive 18 165    Urine Culture Final report   18 1500    Gonorrhea Negative  Negative 18 1300    Chlamydia Negative  Negative 18 1300    TSH 3.31 mIU/mL 0.27 - 4.2 mIU/mL 18 1519          2nd and 3rd Trimester     Test Value Reference Range Date Time    Hemoglobin (repeated) 12.7 g/dL 11.9 - 15.5 g/dL 18 214    Hematocrit (repeated) 36.7 % 35.6 - 45.5 % 18 214     mg/dL 65 - 139 mg/dL 18 1216    Antibody Screen (repeated) Negative   18 2141    GTT Fasting 76 mg/dL mg/dL 18 0927    GTT 1 Hr 149 mg/dL 40 -  300 mg/dL 09/26/18 0927    GTT 2 Hr 145 mg/dL 40 - 300 mg/dL 09/26/18 0927    GTT 3 Hr 105 mg/dL 40 - 300 mg/dL 09/26/18 0927    Group B Strep Negative  Negative 11/30/18 1449          Drug Screening     Test Value Reference Range Date Time    Amphetamine Screen Negative ng/mL Czswkj=0529 ng/mL 05/07/18 1500    Barbiturate Screen Negative ng/mL Rteuys=890 ng/mL 05/07/18 1500    Benzodiazepine Screen Negative ng/mL Hpuhkw=359 ng/mL 05/07/18 1500    Methadone Screen Negative ng/mL Wpwayh=876 ng/mL 05/07/18 1500    Phencyclidine Screen Negative ng/mL Cutoff=25 ng/mL 05/07/18 1500    Opiates Screen Negative ng/mL Keaifh=530 ng/mL 05/07/18 1500    THC Screen Negative ng/mL Cutoff=50 ng/mL 05/07/18 1500    Cocaine Screen Negative ng/mL Zwhbju=994 ng/mL 05/07/18 1500    Propoxyphene Screen Negative ng/mL Jzkiny=526 ng/mL 05/07/18 1500    Buprenorphine Screen        Methamphetamine Screen        Oxycodone Screen        Tryicyclic Antidepressants Screen              Other (Risk screening)     Test Value Reference Range Date Time    Varicella IgG        Parvovirus IgG        CMV IgG        Cystic Fibrosis        Hemoglobin electrophoresis        NIPT        MSAFP-4        AFP (for NTD only)                  External Prenatal Results     Pregnancy Outside Results - Transcribed From Office Records - See Scanned Records For Details     Test Value Date Time    Hgb 12.7 g/dL 12/13/18 2141    Hct 36.7 % 12/13/18 2141    ABO O  12/13/18 2141    Rh Positive  12/13/18 2141    Antibody Screen Negative  12/13/18 2141    Glucose Fasting GTT 76 mg/dL 09/26/18 0927    Glucose Tolerance Test 1 hour 149 mg/dL 09/26/18 0927    Glucose Tolerance Test 3 hour 105 mg/dL 09/26/18 0927    Gonorrhea (discrete) Negative  05/07/18 1300    Chlamydia (discrete) Negative  05/07/18 1300    RPR Non Reactive  05/07/18 1659    VDRL       Syphilis Antibody       Rubella <0.90 index 05/07/18 1659    HBsAg Negative  05/07/18 1659    Herpes Simplex Virus PCR        Herpes Simplex VIrus Culture       HIV Non Reactive  18 1659    Hep C RNA Quant PCR       Hep C Antibody       AFP       Group B Strep Negative  18 1449    GBS Susceptibility to Clindamycin       GBS Susceptibility to Erythromycin       Fetal Fibronectin       Genetic Testing, Maternal Blood             Drug Screening     Test Value Date Time    Urine Drug Screen       Amphetamine Screen Negative ng/mL 18 1500    Barbiturate Screen Negative ng/mL 18 1500    Benzodiazepine Screen Negative ng/mL 18 1500    Methadone Screen Negative ng/mL 18 1500    Phencyclidine Screen Negative ng/mL 18 1500    Opiates Screen       THC Screen       Cocaine Screen       Propoxyphene Screen Negative ng/mL 18 1500    Buprenorphine Screen       Methamphetamine Screen       Oxycodone Screen       Tricyclic Antidepressants Screen                    Past OB History:     Obstetric History       T0      L0     SAB0   TAB0   Ectopic0   Molar0   Multiple0   Live Births0       # Outcome Date GA Lbr Supa/2nd Weight Sex Delivery Anes PTL Lv   1 Current                   Past Medical History: Past Medical History:   Diagnosis Date   • Elevated liver enzymes    • Gestational hypertension    • Hypertension       Past Surgical History Past Surgical History:   Procedure Laterality Date   • ADENOIDECTOMY     • WISDOM TOOTH EXTRACTION        Family History: Family History   Problem Relation Age of Onset   • Breast cancer Neg Hx    • Ovarian cancer Neg Hx    • Uterine cancer Neg Hx    • Colon cancer Neg Hx       Social History:  reports that  has never smoked. she has never used smokeless tobacco.   reports that she does not drink alcohol.   reports that she does not use drugs.        General ROS: Pertinent items are noted in HPI    Objective       Vital Signs Range for the last 24 hours  Temperature: Temp:  [97.9 °F (36.6 °C)-98.6 °F (37 °C)] 98.3 °F (36.8 °C)   Temp Source: Temp src: Oral    BP: BP: (138-179)/() 145/77   Pulse: Heart Rate:  [54-78] 72   Respirations: Resp:  [16-18] 16   SPO2: SpO2:  [98 %] 98 %   O2 Amount (l/min):     O2 Devices Device (Oxygen Therapy): room air   Weight: Weight:  [104 kg (229 lb 6.4 oz)] 104 kg (229 lb 6.4 oz)     Physical Examination: General appearance - alert, well appearing, and in no distress  Mental status - alert, oriented to person, place, and time  Eyes - sclera anicteric, left eye normal, right eye normal  Chest - no tachypnea, retractions or cyanosis  Heart - normal rate and regular rhythm  Abdomen - soft, gravid, nontender, ctx palpate moderate  Pelvic - see RN exam  Back exam - full range of motion, no tenderness, palpable spasm or pain on motion  Neurological - alert, oriented, normal speech, no focal findings or movement disorder noted, no clonus, +1 reflexes bilaterally  Musculoskeletal - no joint tenderness, deformity or swelling  Extremities - pedal edema 2 +  Skin - normal coloration and turgor, no rashes, no suspicious skin lesions noted    Presentation: Vertex   Cervix: Exam by: Method: sterile exam per RN(MANSI De La Cruz RN)   Dilation: Cervical Dilation (cm): 0-1   Effacement: Cervical Effacement: 0%   Station: Fetal Station: 1--> -2       Fetal Heart Rate Assessment   Method: Fetal HR Assessment Method: external   Beats/min: Fetal HR (beats/min): 125   Baseline: Fetal Heart Baseline Rate: normal range   Varibility: Fetal HR Variability: moderate (amplitude range 6 to 25 bpm)   Accels: Fetal HR Accelerations: greater than/equal to 15 bpm, lasting at least 15 seconds   Decels: Fetal HR Decelerations: absent   Tracing Category:       Uterine Assessment   Method: Method: per patient report   Frequency (min): Contraction Frequency (Minutes): 3-4   Ctx Count in 10 min: Contractions in 10 Minutes: 1   Duration:     Intensity: Contraction Intensity: mild by palpation   Intensity by IUPC:     Resting Tone: Uterine Resting Tone: soft by palpation    Resting Tone by IUPC:     Rizwan Units:         Assessment/Plan       Preeclampsia        Assessment:  1.  Intrauterine pregnancy at 37w1d weeks gestation with reactive fetal status.    2.  Superimposed preeclampsia  3.  GBS status: Negative  4. Subclinical hypothyroidism  5. Elevated 1 hour with normal 3 hour GTT    Plan:  1. Cervidil in place, pt experiencing cramping.  Will recheck at 10AM.  Planning to get epidural when ready for pain control.  2. Superimposed preeclampsia: pt has had treatment of her blood pressures with IV medication per protocol. We will continue her home medications with hold parameters.  She has no other severe features.  Her platelets are low at 135 but do not meet threshold for severe features.  Continue to monitor closely and start magnesium as indicated.    3. Subclinical hypothyroidism: started on medication at 30 weeks.  Will hold postpartum and recheck at 6w visit   4. Plan of care has been reviewed with patient and .  Risks, benefits of treatment plan have been discussed.  All questions have been answered.      Felicia Boswell MD  12/14/2018  9:15 AM

## 2018-12-14 NOTE — ANESTHESIA PROCEDURE NOTES
Labor Epidural      Patient reassessed immediately prior to procedure    Patient location during procedure: OB  Performed By  Anesthesiologist: Adam Hawkins MD  Preanesthetic Checklist  Completed: patient identified, site marked, surgical consent, pre-op evaluation, timeout performed, IV checked, risks and benefits discussed and monitors and equipment checked  Additional Notes  Pt monitored by OB RN staff  Test dose -- 3cc 1.5% lidocaine with epi -- neg for HR change, neg for SAB  Prep:  Pt Position:sitting  Sterile Tech:cap, gloves, mask and sterile barrier  Prep:chlorhexidine gluconate and isopropyl alcohol  Monitoring:blood pressure monitoring, continuous pulse oximetry and EKG  Epidural Block Procedure:  Approach:midline  Guidance:palpation technique  Location:L4-L5  Needle Type:Tuohy  Needle Gauge:17  Loss of Resistance Medium: saline  Paresthesia: none  Aspiration:negative  Test Dose:negative  Number of Attempts: 1  Post Assessment:  Dressing:occlusive dressing applied and secured with tape  Pt Tolerance:patient tolerated the procedure well with no apparent complications  Complications:no

## 2018-12-14 NOTE — PLAN OF CARE
Problem: Patient Care Overview  Goal: Plan of Care Review  Outcome: Ongoing (interventions implemented as appropriate)   12/14/18 0646   Coping/Psychosocial   Plan of Care Reviewed With patient   Plan of Care Review   Progress improving     Goal: Discharge Needs Assessment  Outcome: Ongoing (interventions implemented as appropriate)   12/13/18 2110 12/13/18 2113   Discharge Needs Assessment   Readmission Within the Last 30 Days no previous admission in last 30 days --    Concerns to be Addressed no discharge needs identified --    Patient/Family Anticipates Transition to home --    Patient/Family Anticipated Services at Transition none --    Transportation Anticipated car, drives self;family or friend will provide --    Anticipated Changes Related to Illness none --    Equipment Needed After Discharge none --    Disability   Equipment Currently Used at Home --  none       Problem: Labor (Cervical Ripen, Induct, Augment) (Adult,Obstetrics,Pediatric)  Goal: Signs and Symptoms of Listed Potential Problems Will be Absent, Minimized or Managed (Labor)  Outcome: Ongoing (interventions implemented as appropriate)   12/14/18 0646   Goal/Outcome Evaluation   Problems Assessed (Labor) all   Problems Present (Labor) none       Problem: Hypertensive Disorders in Pregnancy (Adult,Obstetrics,Pediatric)  Goal: Signs and Symptoms of Listed Potential Problems Will be Absent, Minimized or Managed (Hypertensive Disorders in Pregnancy)  Outcome: Ongoing (interventions implemented as appropriate)   12/14/18 0646   Goal/Outcome Evaluation   Problems Assessed (Hypertensive Disorders in Pregnancy) all   Problems Present (Hypertensive/in PG) none

## 2018-12-14 NOTE — PROGRESS NOTES
Late entry for approximately 1300  Pt comfortable with epidural. She is on right side (where BP cuff is) and is trying to put on make up which may be cause of last severe range blood pressure. She is asymptomatic. Will repeat BP in appropriate position.  FHR: Category 1  Continue pitocin augmentation.

## 2018-12-14 NOTE — PLAN OF CARE
Problem: Patient Care Overview  Goal: Plan of Care Review  Outcome: Ongoing (interventions implemented as appropriate)   18   Coping/Psychosocial   Plan of Care Reviewed With patient;spouse   Plan of Care Review   Progress improving   OTHER   Outcome Summary Pt. got comfortable with epidural. Pitocin infusing. BPs being managed with medications.     Goal: Individualization and Mutuality  Outcome: Ongoing (interventions implemented as appropriate)   18   Individualization   Patient Specific Preferences Keep BPs low, vaginal delivery   Patient Specific Goals (Include Timeframe) Pain management with epidural   Patient Specific Interventions LUCIUS, breastfeeding, BP medications   Mutuality/Individual Preferences   What Anxieties, Fears, Concerns, or Questions Do You Have About Your Care?    What Information Would Help Us Give You More Personalized Care? Patient is a nurse on [employer]   How Would You and/or Your Support Person Like to Participate in Your Care? FOB involved in care and delivery   Mutuality/Individual Preferences   How to Address Anxieties/Fears Discuss POC     Goal: Discharge Needs Assessment   18   Discharge Needs Assessment   Readmission Within the Last 30 Days no previous admission in last 30 days   Concerns to be Addressed no discharge needs identified   Patient/Family Anticipates Transition to home with family   Patient/Family Anticipated Services at Transition none   Transportation Concerns car, none   Transportation Anticipated family or friend will provide   Anticipated Changes Related to Illness none   Equipment Needed After Discharge none   Disability   Equipment Currently Used at Home none       Problem: Labor (Cervical Ripen, Induct, Augment) (Adult,Obstetrics,Pediatric)  Goal: Signs and Symptoms of Listed Potential Problems Will be Absent, Minimized or Managed (Labor)  Outcome: Ongoing (interventions implemented as appropriate)   18    Goal/Outcome Evaluation   Problems Assessed (Labor) all   Problems Present (Labor) pain       Problem: Hypertensive Disorders in Pregnancy (Adult,Obstetrics,Pediatric)  Goal: Signs and Symptoms of Listed Potential Problems Will be Absent, Minimized or Managed (Hypertensive Disorders in Pregnancy)  Outcome: Ongoing (interventions implemented as appropriate)   18   Goal/Outcome Evaluation   Problems Assessed (Hypertensive Disorders in Pregnancy) all       Problem: Fall Risk,  (Adult,Obstetrics,Pediatric)  Goal: Identify Related Risk Factors and Signs and Symptoms   18   Fall Risk,  (Adult,Obstetrics,Pediatric)   Related Risk Factors (Fall Risk, ) prolonged bed rest;pregnancy weight gain;regional anesthesia   Signs and Symptoms (Fall Risk, ) presence of fall risk factors     Goal: Absence of Maternal Fall  Outcome: Ongoing (interventions implemented as appropriate)   18   Fall Risk,  (Adult,Obstetrics,Pediatric)   Absence of Maternal Fall achieves outcome       Problem: Skin Injury Risk (Adult)  Goal: Identify Related Risk Factors and Signs and Symptoms  Outcome: Ongoing (interventions implemented as appropriate)   18   Skin Injury Risk (Adult)   Related Risk Factors (Skin Injury Risk) mobility impaired;nutritional deficiencies      18   Skin Injury Risk (Adult)   Related Risk Factors (Skin Injury Risk) mobility impaired;nutritional deficiencies     Goal: Skin Health and Integrity  Outcome: Ongoing (interventions implemented as appropriate)   18   Skin Injury Risk (Adult)   Skin Health and Integrity achieves outcome       Problem: Anesthesia/Analgesia, Neuraxial (Obstetrics)  Goal: Signs and Symptoms of Listed Potential Problems Will be Absent, Minimized or Managed (Anesthesia/Analgesia, Neuraxial)  Outcome: Ongoing (interventions implemented as appropriate)   18   Goal/Outcome Evaluation    Problems Assessed (Neuraxial Anesthesia/Analgesia, OB) all   Problems Present (Neuraxial Anesth OB) none

## 2018-12-15 LAB
ALBUMIN SERPL-MCNC: 3 G/DL (ref 3.5–5.2)
ALBUMIN/GLOB SERPL: 1 G/DL
ALP SERPL-CCNC: 171 U/L (ref 39–117)
ALT SERPL W P-5'-P-CCNC: 25 U/L (ref 1–33)
ANION GAP SERPL CALCULATED.3IONS-SCNC: 13 MMOL/L
APTT PPP: 31.8 SECONDS (ref 22.7–35.4)
AST SERPL-CCNC: 23 U/L (ref 1–32)
BASOPHILS # BLD AUTO: 0.02 10*3/MM3 (ref 0–0.2)
BASOPHILS # BLD AUTO: 0.03 10*3/MM3 (ref 0–0.2)
BASOPHILS NFR BLD AUTO: 0.1 % (ref 0–1.5)
BASOPHILS NFR BLD AUTO: 0.2 % (ref 0–1.5)
BILIRUB SERPL-MCNC: 0.5 MG/DL (ref 0.1–1.2)
BUN BLD-MCNC: 8 MG/DL (ref 6–20)
BUN/CREAT SERPL: 13.3 (ref 7–25)
CALCIUM SPEC-SCNC: 8.9 MG/DL (ref 8.6–10.5)
CHLORIDE SERPL-SCNC: 106 MMOL/L (ref 98–107)
CO2 SERPL-SCNC: 19 MMOL/L (ref 22–29)
CREAT BLD-MCNC: 0.6 MG/DL (ref 0.57–1)
DEPRECATED RDW RBC AUTO: 44.5 FL (ref 37–54)
DEPRECATED RDW RBC AUTO: 45.8 FL (ref 37–54)
EOSINOPHIL # BLD AUTO: 0.01 10*3/MM3 (ref 0–0.7)
EOSINOPHIL # BLD AUTO: 0.01 10*3/MM3 (ref 0–0.7)
EOSINOPHIL NFR BLD AUTO: 0.1 % (ref 0.3–6.2)
EOSINOPHIL NFR BLD AUTO: 0.1 % (ref 0.3–6.2)
ERYTHROCYTE [DISTWIDTH] IN BLOOD BY AUTOMATED COUNT: 13.5 % (ref 11.7–13)
ERYTHROCYTE [DISTWIDTH] IN BLOOD BY AUTOMATED COUNT: 13.8 % (ref 11.7–13)
FIBRINOGEN PPP-MCNC: 380 MG/DL (ref 219–464)
GFR SERPL CREATININE-BSD FRML MDRD: 121 ML/MIN/1.73
GLOBULIN UR ELPH-MCNC: 2.9 GM/DL
GLUCOSE BLD-MCNC: 76 MG/DL (ref 65–99)
HCT VFR BLD AUTO: 31.9 % (ref 35.6–45.5)
HCT VFR BLD AUTO: 38.1 % (ref 35.6–45.5)
HGB BLD-MCNC: 10.7 G/DL (ref 11.9–15.5)
HGB BLD-MCNC: 13.2 G/DL (ref 11.9–15.5)
IMM GRANULOCYTES # BLD: 0.07 10*3/MM3 (ref 0–0.03)
IMM GRANULOCYTES NFR BLD: 0.4 % (ref 0–0.5)
INR PPP: 1.1 (ref 0.9–1.1)
LYMPHOCYTES # BLD AUTO: 1.96 10*3/MM3 (ref 0.9–4.8)
LYMPHOCYTES # BLD AUTO: 2.06 10*3/MM3 (ref 0.9–4.8)
LYMPHOCYTES NFR BLD AUTO: 10.4 % (ref 19.6–45.3)
LYMPHOCYTES NFR BLD AUTO: 14.8 % (ref 19.6–45.3)
MCH RBC QN AUTO: 30.8 PG (ref 26.9–32)
MCH RBC QN AUTO: 31.5 PG (ref 26.9–32)
MCHC RBC AUTO-ENTMCNC: 33.5 G/DL (ref 32.4–36.3)
MCHC RBC AUTO-ENTMCNC: 34.6 G/DL (ref 32.4–36.3)
MCV RBC AUTO: 90.9 FL (ref 80.5–98.2)
MCV RBC AUTO: 91.9 FL (ref 80.5–98.2)
MONOCYTES # BLD AUTO: 1.21 10*3/MM3 (ref 0.2–1.2)
MONOCYTES # BLD AUTO: 1.39 10*3/MM3 (ref 0.2–1.2)
MONOCYTES NFR BLD AUTO: 7.4 % (ref 5–12)
MONOCYTES NFR BLD AUTO: 8.7 % (ref 5–12)
NEUTROPHILS # BLD AUTO: 10.6 10*3/MM3 (ref 1.9–8.1)
NEUTROPHILS # BLD AUTO: 15.44 10*3/MM3 (ref 1.9–8.1)
NEUTROPHILS NFR BLD AUTO: 76.3 % (ref 42.7–76)
NEUTROPHILS NFR BLD AUTO: 81.9 % (ref 42.7–76)
PLATELET # BLD AUTO: 119 10*3/MM3 (ref 140–500)
PLATELET # BLD AUTO: 129 10*3/MM3 (ref 140–500)
PMV BLD AUTO: 13.4 FL (ref 6–12)
PMV BLD AUTO: 14.1 FL (ref 6–12)
POTASSIUM BLD-SCNC: 4 MMOL/L (ref 3.5–5.2)
PROT SERPL-MCNC: 5.9 G/DL (ref 6–8.5)
PROTHROMBIN TIME: 14 SECONDS (ref 11.7–14.2)
RBC # BLD AUTO: 3.47 10*6/MM3 (ref 3.9–5.2)
RBC # BLD AUTO: 4.19 10*6/MM3 (ref 3.9–5.2)
SODIUM BLD-SCNC: 138 MMOL/L (ref 136–145)
WBC NRBC COR # BLD: 13.9 10*3/MM3 (ref 4.5–10.7)
WBC NRBC COR # BLD: 18.83 10*3/MM3 (ref 4.5–10.7)

## 2018-12-15 PROCEDURE — 80053 COMPREHEN METABOLIC PANEL: CPT | Performed by: OBSTETRICS & GYNECOLOGY

## 2018-12-15 PROCEDURE — 85027 COMPLETE CBC AUTOMATED: CPT | Performed by: OBSTETRICS & GYNECOLOGY

## 2018-12-15 PROCEDURE — 59510 CESAREAN DELIVERY: CPT | Performed by: OBSTETRICS & GYNECOLOGY

## 2018-12-15 PROCEDURE — 85384 FIBRINOGEN ACTIVITY: CPT | Performed by: OBSTETRICS & GYNECOLOGY

## 2018-12-15 PROCEDURE — 25010000002 MORPHINE PER 10 MG: Performed by: ANESTHESIOLOGY

## 2018-12-15 PROCEDURE — 0W3R7ZZ CONTROL BLEEDING IN GENITOURINARY TRACT, VIA NATURAL OR ARTIFICIAL OPENING: ICD-10-PCS | Performed by: OBSTETRICS & GYNECOLOGY

## 2018-12-15 PROCEDURE — 25010000002 MORPHINE SULFATE (PF) 2 MG/ML SOLUTION: Performed by: ANESTHESIOLOGY

## 2018-12-15 PROCEDURE — 25010000002 ONDANSETRON PER 1 MG: Performed by: OBSTETRICS & GYNECOLOGY

## 2018-12-15 PROCEDURE — 25010000002 ONDANSETRON PER 1 MG: Performed by: ANESTHESIOLOGY

## 2018-12-15 PROCEDURE — 59899 UNLISTED PX MAT CARE&DLVR: CPT | Performed by: OBSTETRICS & GYNECOLOGY

## 2018-12-15 PROCEDURE — 85730 THROMBOPLASTIN TIME PARTIAL: CPT | Performed by: OBSTETRICS & GYNECOLOGY

## 2018-12-15 PROCEDURE — 85610 PROTHROMBIN TIME: CPT | Performed by: OBSTETRICS & GYNECOLOGY

## 2018-12-15 PROCEDURE — 25010000003 CEFAZOLIN IN DEXTROSE 2-4 GM/100ML-% SOLUTION

## 2018-12-15 PROCEDURE — 85025 COMPLETE CBC W/AUTO DIFF WBC: CPT | Performed by: OBSTETRICS & GYNECOLOGY

## 2018-12-15 RX ORDER — NIFEDIPINE 30 MG/1
30 TABLET, EXTENDED RELEASE ORAL DAILY
Status: DISCONTINUED | OUTPATIENT
Start: 2018-12-15 | End: 2018-12-19 | Stop reason: HOSPADM

## 2018-12-15 RX ORDER — LABETALOL HYDROCHLORIDE 5 MG/ML
INJECTION, SOLUTION INTRAVENOUS
Status: COMPLETED
Start: 2018-12-15 | End: 2018-12-15

## 2018-12-15 RX ORDER — OXYCODONE AND ACETAMINOPHEN 10; 325 MG/1; MG/1
1 TABLET ORAL EVERY 4 HOURS PRN
Status: DISCONTINUED | OUTPATIENT
Start: 2018-12-15 | End: 2018-12-19 | Stop reason: HOSPADM

## 2018-12-15 RX ORDER — OXYTOCIN 10 [USP'U]/ML
10 INJECTION, SOLUTION INTRAMUSCULAR; INTRAVENOUS ONCE
Status: DISCONTINUED | OUTPATIENT
Start: 2018-12-15 | End: 2018-12-15

## 2018-12-15 RX ORDER — MORPHINE SULFATE 1 MG/ML
INJECTION, SOLUTION EPIDURAL; INTRATHECAL; INTRAVENOUS AS NEEDED
Status: DISCONTINUED | OUTPATIENT
Start: 2018-12-15 | End: 2018-12-15 | Stop reason: SURG

## 2018-12-15 RX ORDER — CEFAZOLIN SODIUM 2 G/100ML
INJECTION, SOLUTION INTRAVENOUS
Status: COMPLETED
Start: 2018-12-15 | End: 2018-12-15

## 2018-12-15 RX ORDER — OXYTOCIN-SODIUM CHLORIDE 0.9% IV SOLN 30 UNIT/500ML 30-0.9/5 UT/ML-%
125 SOLUTION INTRAVENOUS CONTINUOUS PRN
Status: COMPLETED | OUTPATIENT
Start: 2018-12-15 | End: 2018-12-15

## 2018-12-15 RX ORDER — MORPHINE SULFATE 1 MG/ML
INJECTION, SOLUTION EPIDURAL; INTRATHECAL; INTRAVENOUS
Status: COMPLETED
Start: 2018-12-15 | End: 2018-12-15

## 2018-12-15 RX ORDER — LABETALOL HYDROCHLORIDE 5 MG/ML
20 INJECTION, SOLUTION INTRAVENOUS ONCE
Status: COMPLETED | OUTPATIENT
Start: 2018-12-15 | End: 2018-12-15

## 2018-12-15 RX ORDER — LABETALOL HYDROCHLORIDE 5 MG/ML
40 INJECTION, SOLUTION INTRAVENOUS ONCE
Status: COMPLETED | OUTPATIENT
Start: 2018-12-15 | End: 2018-12-15

## 2018-12-15 RX ORDER — MISOPROSTOL 200 UG/1
800 TABLET ORAL AS NEEDED
Status: DISCONTINUED | OUTPATIENT
Start: 2018-12-15 | End: 2018-12-16 | Stop reason: HOSPADM

## 2018-12-15 RX ORDER — METHYLERGONOVINE MALEATE 0.2 MG/ML
200 INJECTION INTRAVENOUS ONCE AS NEEDED
Status: DISCONTINUED | OUTPATIENT
Start: 2018-12-15 | End: 2018-12-16 | Stop reason: HOSPADM

## 2018-12-15 RX ORDER — SODIUM CHLORIDE, SODIUM LACTATE, POTASSIUM CHLORIDE, CALCIUM CHLORIDE 600; 310; 30; 20 MG/100ML; MG/100ML; MG/100ML; MG/100ML
999 INJECTION, SOLUTION INTRAVENOUS CONTINUOUS
Status: DISCONTINUED | OUTPATIENT
Start: 2018-12-15 | End: 2018-12-19 | Stop reason: HOSPADM

## 2018-12-15 RX ORDER — OXYTOCIN-SODIUM CHLORIDE 0.9% IV SOLN 30 UNIT/500ML 30-0.9/5 UT/ML-%
250 SOLUTION INTRAVENOUS CONTINUOUS
Status: ACTIVE | OUTPATIENT
Start: 2018-12-15 | End: 2018-12-15

## 2018-12-15 RX ORDER — ACETAMINOPHEN 500 MG
500 TABLET ORAL EVERY 6 HOURS
Status: DISCONTINUED | OUTPATIENT
Start: 2018-12-15 | End: 2018-12-19 | Stop reason: HOSPADM

## 2018-12-15 RX ORDER — CARBOPROST TROMETHAMINE 250 UG/ML
250 INJECTION, SOLUTION INTRAMUSCULAR AS NEEDED
Status: DISCONTINUED | OUTPATIENT
Start: 2018-12-15 | End: 2018-12-16 | Stop reason: HOSPADM

## 2018-12-15 RX ORDER — CEFAZOLIN SODIUM 2 G/100ML
2 INJECTION, SOLUTION INTRAVENOUS ONCE
Status: COMPLETED | OUTPATIENT
Start: 2018-12-15 | End: 2018-12-15

## 2018-12-15 RX ORDER — OXYTOCIN-SODIUM CHLORIDE 0.9% IV SOLN 30 UNIT/500ML 30-0.9/5 UT/ML-%
999 SOLUTION INTRAVENOUS ONCE
Status: DISCONTINUED | OUTPATIENT
Start: 2018-12-15 | End: 2018-12-16 | Stop reason: HOSPADM

## 2018-12-15 RX ORDER — IBUPROFEN 600 MG/1
600 TABLET ORAL EVERY 8 HOURS PRN
Status: DISCONTINUED | OUTPATIENT
Start: 2018-12-15 | End: 2018-12-15

## 2018-12-15 RX ORDER — LIDOCAINE HYDROCHLORIDE AND EPINEPHRINE 20; 5 MG/ML; UG/ML
INJECTION, SOLUTION EPIDURAL; INFILTRATION; INTRACAUDAL; PERINEURAL AS NEEDED
Status: DISCONTINUED | OUTPATIENT
Start: 2018-12-15 | End: 2018-12-15 | Stop reason: SURG

## 2018-12-15 RX ORDER — OXYTOCIN 10 [USP'U]/ML
INJECTION, SOLUTION INTRAMUSCULAR; INTRAVENOUS
Status: DISPENSED
Start: 2018-12-15 | End: 2018-12-16

## 2018-12-15 RX ORDER — TRANEXAMIC ACID 100 MG/ML
INJECTION, SOLUTION INTRAVENOUS
Status: COMPLETED
Start: 2018-12-15 | End: 2018-12-15

## 2018-12-15 RX ORDER — ACETAMINOPHEN 500 MG
1000 TABLET ORAL ONCE
Status: COMPLETED | OUTPATIENT
Start: 2018-12-15 | End: 2018-12-15

## 2018-12-15 RX ORDER — ERYTHROMYCIN 5 MG/G
OINTMENT OPHTHALMIC
Status: DISPENSED
Start: 2018-12-15 | End: 2018-12-16

## 2018-12-15 RX ORDER — CARBOPROST TROMETHAMINE 250 UG/ML
INJECTION, SOLUTION INTRAMUSCULAR
Status: COMPLETED
Start: 2018-12-15 | End: 2018-12-15

## 2018-12-15 RX ORDER — OXYCODONE HYDROCHLORIDE AND ACETAMINOPHEN 5; 325 MG/1; MG/1
1 TABLET ORAL EVERY 4 HOURS PRN
Status: DISCONTINUED | OUTPATIENT
Start: 2018-12-15 | End: 2018-12-19 | Stop reason: HOSPADM

## 2018-12-15 RX ORDER — MISOPROSTOL 200 UG/1
TABLET ORAL
Status: COMPLETED
Start: 2018-12-15 | End: 2018-12-15

## 2018-12-15 RX ORDER — MORPHINE SULFATE 2 MG/ML
INJECTION, SOLUTION INTRAMUSCULAR; INTRAVENOUS AS NEEDED
Status: DISCONTINUED | OUTPATIENT
Start: 2018-12-15 | End: 2018-12-15 | Stop reason: SURG

## 2018-12-15 RX ORDER — PHYTONADIONE 2 MG/ML
INJECTION, EMULSION INTRAMUSCULAR; INTRAVENOUS; SUBCUTANEOUS
Status: DISPENSED
Start: 2018-12-15 | End: 2018-12-16

## 2018-12-15 RX ORDER — LOPERAMIDE HYDROCHLORIDE 2 MG/1
4 CAPSULE ORAL 4 TIMES DAILY PRN
Status: DISCONTINUED | OUTPATIENT
Start: 2018-12-15 | End: 2018-12-19 | Stop reason: HOSPADM

## 2018-12-15 RX ADMIN — SODIUM CHLORIDE, POTASSIUM CHLORIDE, SODIUM LACTATE AND CALCIUM CHLORIDE 125 ML/HR: 600; 310; 30; 20 INJECTION, SOLUTION INTRAVENOUS at 04:31

## 2018-12-15 RX ADMIN — LABETALOL HCL 400 MG: 200 TABLET, FILM COATED ORAL at 20:05

## 2018-12-15 RX ADMIN — LABETALOL HYDROCHLORIDE 20 MG: 5 INJECTION, SOLUTION INTRAVENOUS at 10:19

## 2018-12-15 RX ADMIN — ACETAMINOPHEN 1000 MG: 500 TABLET, FILM COATED ORAL at 15:04

## 2018-12-15 RX ADMIN — CEFAZOLIN SODIUM 2 G: 2 INJECTION, SOLUTION INTRAVENOUS at 15:09

## 2018-12-15 RX ADMIN — LIDOCAINE HYDROCHLORIDE,EPINEPHRINE BITARTRATE 10 ML: 20; .005 INJECTION, SOLUTION EPIDURAL; INFILTRATION; INTRACAUDAL; PERINEURAL at 15:01

## 2018-12-15 RX ADMIN — MISOPROSTOL 800 MCG: 200 TABLET ORAL at 16:20

## 2018-12-15 RX ADMIN — NIFEDIPINE 30 MG: 30 TABLET, FILM COATED, EXTENDED RELEASE ORAL at 04:45

## 2018-12-15 RX ADMIN — CARBOPROST TROMETHAMINE 250 MCG: 250 INJECTION, SOLUTION INTRAMUSCULAR at 15:43

## 2018-12-15 RX ADMIN — ONDANSETRON 4 MG: 2 INJECTION INTRAMUSCULAR; INTRAVENOUS at 12:28

## 2018-12-15 RX ADMIN — EPHEDRINE SULFATE 10 MG: 50 INJECTION INTRAMUSCULAR; INTRAVENOUS; SUBCUTANEOUS at 16:00

## 2018-12-15 RX ADMIN — OXYTOCIN 125 ML/HR: 10 INJECTION, SOLUTION INTRAMUSCULAR; INTRAVENOUS at 16:57

## 2018-12-15 RX ADMIN — AZITHROMYCIN DIHYDRATE 500 MG: 500 INJECTION, POWDER, LYOPHILIZED, FOR SOLUTION INTRAVENOUS at 15:40

## 2018-12-15 RX ADMIN — LIDOCAINE HYDROCHLORIDE,EPINEPHRINE BITARTRATE 10 ML: 20; .005 INJECTION, SOLUTION EPIDURAL; INFILTRATION; INTRACAUDAL; PERINEURAL at 14:55

## 2018-12-15 RX ADMIN — EPHEDRINE SULFATE 10 MG: 50 INJECTION INTRAMUSCULAR; INTRAVENOUS; SUBCUTANEOUS at 16:10

## 2018-12-15 RX ADMIN — ONDANSETRON 4 MG: 2 INJECTION INTRAMUSCULAR; INTRAVENOUS at 15:07

## 2018-12-15 RX ADMIN — LABETALOL HYDROCHLORIDE 40 MG: 5 INJECTION, SOLUTION INTRAVENOUS at 13:09

## 2018-12-15 RX ADMIN — TRANEXAMIC ACID 1000 MG: 100 INJECTION, SOLUTION INTRAVENOUS at 16:56

## 2018-12-15 RX ADMIN — MORPHINE SULFATE 2 MG: 2 INJECTION, SOLUTION INTRAMUSCULAR; INTRAVENOUS at 16:07

## 2018-12-15 RX ADMIN — SODIUM CHLORIDE, SODIUM LACTATE, POTASSIUM CHLORIDE, CALCIUM CHLORIDE 125 ML/HR: 600; 310; 30; 20 INJECTION, SOLUTION INTRAVENOUS at 17:12

## 2018-12-15 RX ADMIN — EPHEDRINE SULFATE 10 MG: 50 INJECTION INTRAMUSCULAR; INTRAVENOUS; SUBCUTANEOUS at 15:50

## 2018-12-15 RX ADMIN — EPHEDRINE SULFATE 10 MG: 50 INJECTION INTRAMUSCULAR; INTRAVENOUS; SUBCUTANEOUS at 15:48

## 2018-12-15 RX ADMIN — LABETALOL HCL 400 MG: 200 TABLET, FILM COATED ORAL at 08:50

## 2018-12-15 RX ADMIN — LABETALOL HYDROCHLORIDE 20 MG: 5 INJECTION, SOLUTION INTRAVENOUS at 07:04

## 2018-12-15 RX ADMIN — MORPHINE SULFATE 4 MG: 1 INJECTION EPIDURAL; INTRATHECAL; INTRAVENOUS at 15:44

## 2018-12-15 RX ADMIN — ACETAMINOPHEN 500 MG: 500 TABLET, FILM COATED ORAL at 20:32

## 2018-12-15 RX ADMIN — SODIUM CHLORIDE, POTASSIUM CHLORIDE, SODIUM LACTATE AND CALCIUM CHLORIDE: 600; 310; 30; 20 INJECTION, SOLUTION INTRAVENOUS at 15:21

## 2018-12-15 RX ADMIN — FAMOTIDINE 20 MG: 10 INJECTION INTRAVENOUS at 15:06

## 2018-12-15 RX ADMIN — SODIUM CHLORIDE, POTASSIUM CHLORIDE, SODIUM LACTATE AND CALCIUM CHLORIDE 125 ML/HR: 600; 310; 30; 20 INJECTION, SOLUTION INTRAVENOUS at 22:18

## 2018-12-15 RX ADMIN — SODIUM CHLORIDE, POTASSIUM CHLORIDE, SODIUM LACTATE AND CALCIUM CHLORIDE 125 ML/HR: 600; 310; 30; 20 INJECTION, SOLUTION INTRAVENOUS at 12:31

## 2018-12-15 RX ADMIN — MORPHINE SULFATE 2 MG: 2 INJECTION, SOLUTION INTRAMUSCULAR; INTRAVENOUS at 16:10

## 2018-12-15 RX ADMIN — MORPHINE SULFATE 2 MG: 2 INJECTION, SOLUTION INTRAMUSCULAR; INTRAVENOUS at 16:15

## 2018-12-15 NOTE — OP NOTE
Operative Report: Low Transverse  Section    Patient Name: Callie Parks   MRN: [id number]    Date of Surgery: 12/15/18     Pre-op Diagnoses:   1. Intrauterine pregnancy at 37w2d  2. Superimposed preeclampsia  3. Arrest of dilatoin    Post op Diagnoses:  same     4. Postpartum hemorrhage secondary to uterine atony     Procedure: Primary low transverse  section via Pfannenstiel incision    Surgeon: Felicia Boswell MD     Assistant: Omid Diane    Anesthesia: Epidural    EBL: 1500mL    Specimen: Placenta sent to pathology    Complication: Postpartum hemorrhage    Findings: Normal uterus, normal bilateral tubes and ovaries, no adhesive disease    Infant Details: Female infant in vertex presentation, weight 2740g, apgars 8/9,     Indication and Consent  Callie Parks is a 28 y.o.  who presented at 37 weeks 0 days for induction of labor for superimposed preeclampsia.  The patient underwent induction of labor with Cervidil, Pitocin, AROM with IUPC placed.  Despite 24 hours of Pitocin augmentation and rupture for over 18 hours her cervix did not progress.  Discussion was had regarding continued augmentation of labor versus primary  section.  Decision was made for primary  section.  The patient understood that the risks of  section include, but are not limited to, visceral or vascular injury, infection, blood loss and need for transfusion, prolonged hospitalization and reoperation. The patient stated understanding and desired to proceed. All questions were answered.    Procedure:  The patient was taken to the operating room where  anesthesia was found to be adequate. Two grams of Ancef and 500mg Azithromycin were given for infection prophylaxis. She was prepared and draped in the dorsal supine position with a leftward tilt. A Pfannenstiel skin incision was made with the scalpel. The incision was carried down to the fascia sharply and with the bovie. The superior aspect of  the fascia was grasped with Kocher clamps and the underlying rectus muscle was dissected off sharply with burciaga scissors. In a similar fashion, the inferior aspect of the fascia was grasped with kocher clamps and the rectus muscle and pyramidalis muscle were dissected off with burciaga scissors. The rectus muscle was  in the midline down to the level of the pubic symphysis. Preperitoneal fatty tissue was bluntly dissected to expose the peritoneum. The peritoneum was found to be free of adherent bowel and entered bluntly. The peritoneal incision was extended superiorly and inferiorly to the bladder reflection with good visualization of the bladder.    The bladder blade was inserted and vesicouterine peritoneum identified. Intraabdominal survey revealed scant, clear peritoneal fluid and the thinned out lower uterine segment. The vesicouterine peritoneum was opened with scissors and the bladder flap was developed. The bladder blade was repositioned to keep the bladder out of the operative field. The lower uterine segment was incised with a scalpel. The amniotic sac was ruptured and clear fluid was noted. The uterine incision was extended bluntly with lateral and upward traction.    The fetus was in cephalic position. The head was elevated out of the pelvis with special attention paid to avoid using the uterine incision as a fulcrum. Gentle fundal pressure was applied once the head was brought into the incision. The infant was delivered with no difficulty. The mouth and nose were suctioned with a bulb. The cord was clamped and cut. The infant was handed to the pediatrician. IV oxytocin was initiated to facilitate uterine contractions. The placenta was delivered intact with manual massage of the uterine fundus. Upon removal of the placenta, the uterus was noted to invert and was replaced promptly with gentle fundal pressure.  The uterus was then exteriorized. The inside of the uterus was gently wiped with a lap  sponge to assure complete placental membrane removal. The uterine incision was closed with 0-0 monocryl in a running locked fashion. A second imbricating layer was performed. Hemostasis was noted of the hysterotomy.  The uterine tone was still noted to be decreased and so 1 dose of 250mcg IM Hemabate was administered.  The uterine tone was then noted to improve with massage.  The ovaries and tubes were found to be normal. The uterus, tubes and ovaries were then returned to the abdominal cavity. The blood clots and fluid were wiped out of the abdomen and pelvis with sponges.  The uterine incision was reinspected and good hemostasis was noted. The fundus was palpated and good tone was noted.     The patient became nauseated with traction on the peritoneum and so decision was made to not close the peritoneum at this time. The fascial layer was closed with 0-0 vicryl suture. 3-0 Vicryl was used to reapproximate the subcutaneous tissues. The skin was closed with 3-0 Monocryl in a subcuticular fashion.  The incision was then covered with sterile fashion.  There was noted to be 900mL of blood loss from the surgery. The patient's legs were bowed and approximately 500 mL of dark red blood was noted between her legs.  This was removed and a sterile bimanual exam was performed with an additional 100mL of clot removed from the uterus.  With fundal pressure a small trickle was noted and uterine tone remained intermittently boggy.  The decision was made at this time to place a Bakri balloon which was insufflated with 360mL normal saline.  The uterine fundus was noted to become firm.  The was no bleeding noted around the Bakri balloon.  The Bakri was attached to a cohen bag and will be monitored.  Her heart rate is in normal range, although her blood pressures are low for her baseline after the morphine and episode of emesis.  Will monitor closely and repeat CBC in 4 hours or sooner with unstable vitals.    The patient tolerated  the procedure well. All the counts were correct times two. The patient was taken to the recovery room in stable condition.      Felicia Boswell MD

## 2018-12-15 NOTE — PROGRESS NOTES
Went by recovery and Bakri had 250 in bag.  No extra with fundal pressure, fundus at umbilicus.  Decision to give TXA which was administered along with an extra bag of pit at 125mL/hr, 30U Pit.  H&H, PT, INR, fibrinogen ordered.    Vitals are stable - BP 150s/90s, HR 80s to 90s, pt not in any pain except with fundal checks.  Bedside US confirmed position of Bakri balloon and additional fluid placed in balloon for total of 500mL normal saline.     Ins/Outs tabulated.  Pt has received total of 7000mL LR and 217 mL of pitocin this admission prior to OR.  In Or, she received, 1650mL IVF.    Per calculations, net + 3467mL this admission.  Awaiting H&H, IV fluid running at 125mL/hr    Patient awake, alert, oriented.  Fundus feeling firm at umbilicus after additional fluid in Bakri and no further drainage since initial 250mL drained.

## 2018-12-15 NOTE — PLAN OF CARE
Problem: Patient Care Overview  Goal: Plan of Care Review  Outcome: Ongoing (interventions implemented as appropriate)   12/15/18 0659   Coping/Psychosocial   Plan of Care Reviewed With patient;spouse   Plan of Care Review   Progress improving   OTHER   Outcome Summary pt comfortable with epidural, position changes throughout night and increased pitocin gradually to 16 milliunits.     Goal: Individualization and Mutuality  Outcome: Ongoing (interventions implemented as appropriate)   12/15/18 0659   Individualization   Patient Specific Preferences Vaginal delivery, low BPs throughout night, breastfeeding   Patient Specific Goals (Include Timeframe) pain management and healthy mom and baby   Patient Specific Interventions epidural   Mutuality/Individual Preferences   What Anxieties, Fears, Concerns, or Questions Do You Have About Your Care? worried about what will happen if her cervix does not change   What Information Would Help Us Give You More Personalized Care? none   How Would You and/or Your Support Person Like to Participate in Your Care? none   Mutuality/Individual Preferences   How to Address Anxieties/Fears MD at bedside throughout night to discuss POC with pt, all questions answered       Problem: Labor (Cervical Ripen, Induct, Augment) (Adult,Obstetrics,Pediatric)  Goal: Signs and Symptoms of Listed Potential Problems Will be Absent, Minimized or Managed (Labor)  Outcome: Ongoing (interventions implemented as appropriate)   12/15/18 0659   Goal/Outcome Evaluation   Problems Assessed (Labor) all   Problems Present (Labor) none       Problem: Hypertensive Disorders in Pregnancy (Adult,Obstetrics,Pediatric)  Goal: Signs and Symptoms of Listed Potential Problems Will be Absent, Minimized or Managed (Hypertensive Disorders in Pregnancy)  Outcome: Ongoing (interventions implemented as appropriate)   12/15/18 0659   Goal/Outcome Evaluation   Problems Assessed (Hypertensive Disorders in Pregnancy) all   Problems  Present (Hypertensive/in PG) none       Problem: Fall Risk,  (Adult,Obstetrics,Pediatric)  Goal: Identify Related Risk Factors and Signs and Symptoms  Outcome: Ongoing (interventions implemented as appropriate)   12/15/18 06   Fall Risk,  (Adult,Obstetrics,Pediatric)   Related Risk Factors (Fall Risk, ) medication side effects   Signs and Symptoms (Fall Risk, ) presence of fall risk factors     Goal: Absence of Maternal Fall  Outcome: Ongoing (interventions implemented as appropriate)   12/15/18 0659   Fall Risk,  (Adult,Obstetrics,Pediatric)   Absence of Maternal Fall achieves outcome       Problem: Skin Injury Risk (Adult)  Goal: Skin Health and Integrity  Outcome: Ongoing (interventions implemented as appropriate)   12/15/18 0659   Skin Injury Risk (Adult)   Skin Health and Integrity achieves outcome       Problem: Anesthesia/Analgesia, Neuraxial (Obstetrics)  Goal: Signs and Symptoms of Listed Potential Problems Will be Absent, Minimized or Managed (Anesthesia/Analgesia, Neuraxial)  Outcome: Ongoing (interventions implemented as appropriate)   12/15/18 06   Goal/Outcome Evaluation   Problems Assessed (Neuraxial Anesthesia/Analgesia, OB) all   Problems Present (Neuraxial Anesth OB) none

## 2018-12-15 NOTE — PROGRESS NOTES
Labor Progress Note    SUBJECTIVE:   Reports that she is comfortable with epidural.  Denies headache/vision changes/RUQ pain.      OBJECTIVE:   Vitals:    18 2049 18 2104 18   BP: 168/97 157/87 (!) 182/96 140/78   Pulse: 60 52 61 55   Resp:       Temp:       TempSrc:       SpO2:       Weight:       Height:          Physical Examination:   General appearance - alert, well appearing, and in no distress  Chest - no tachypnea, retractions or cyanosis  Abdomen - soft, gravid nontender   Pelvic - cervix 50/-3, very anterior  Neurological - alert, oriented, normal speech, no focal findings aside from limitations from epidural  Musculoskeletal - no joint tenderness, deformity or swelling  Extremities - pedal edema 2 +  Skin - normal coloration and turgor, no rashes, no suspicious skin lesions noted    FHT  normal baseline, mod variability, mod accelerations, no decelerations  Ladera: 3-4 ctx/ 10 minutes    ASSESSMENT:   28 y.o.  at 37w1d here for IOL secondary to superimposed preeclampsia    PLAN:    Labor:   S/p cervidil   Pitocin, AROM with clear fluid, IUPC placed   Continue augmentation     Pain control adequate with epidural    Fetal status reassuring    GBS Negative    Superimposed preeclampsia:   Pt with intermittent severe pressures - not requiring IV medication since last night when she had not received her home meds (most recent 182 was after BP cuff had inflated multiple times in a row).  Will start magnesium for indications.

## 2018-12-15 NOTE — PROGRESS NOTES
Indication for    Indication: This is a 28 y.o.  who presented at 37w2d by early US for induction of labor secondary to superimposed preeclampsia.  She has had labile blood pressures throughout her pregnancy.  On initial admission, she was treated with IV medication secondary to not receiving her home meds.  We then restarted her home meds, however over the course of this afternoon she has required IV antihypertensives.  She has no lab abnormalities or neurologic symptoms.   We will continue to monitor her blood pressures closely.  For her induction she had Cervidil and progressed to 1-2 cm.  She was then started on Pitocin and contractions were difficult to monitor.  AROM was performed and an IUPC was placed.  The patient has been on Pitocin augmentation for almost over 24 hours and has been ruptured for almost 18 hours without progression in her cervical exam.  The fetal status is reassuring.  I discussed with the patient management with continued augmentation of labor versus primary  for prolonged latent phase. Pt would like to proceed with primary  section.  We reviewed risks including but not limited to infection, bleeding, injury to surrounding structures, need for other surgeries/procedures, hysterectomy, need for blood transfusion, blood clots to legs or lungs, injury to baby.    Diagnoses:   1. IUP at 37w2d  2.Arrest of dilation  3. Superimposed preeclampsia  Planned Procedures:  1. , low transverse   Planned anesthesia: epidural   Pre-Op H/H: 13.2   Placental Location: Posterior  Pre-Op Antibiotics: ancef and azithromycin   Consent: signed and in the chart   Plan: To OR for planned procedure, all questions/concerns addressed with patient and family     Felicia Boswell MD

## 2018-12-15 NOTE — PLAN OF CARE
Problem:  Delivery (Adult,Obstetrics,Pediatric)  Goal: Signs and Symptoms of Listed Potential Problems Will be Absent, Minimized or Managed ( Delivery)  Outcome: Ongoing (interventions implemented as appropriate)   12/15/18 6830   Goal/Outcome Evaluation   Problems Assessed ( Delivery) all   Problems Present ( Delivery) other (see comments)  (PPH)

## 2018-12-15 NOTE — PROGRESS NOTES
Labs reviewed - HgB 10.7, repeat at midnight.  Fibrinogen 380mL INR 1.1.  Pt stable with /105, HR 93.  Will hold beta blocker to not mask tachycardia and restart procardia 30XL q24 and treat BP as indicated.

## 2018-12-15 NOTE — NURSING NOTE
Pt in recovery. First check completed at 1630 with scant amount of bleeding on pad when fundal pressure applied. Placed phone call to Dr. Boswell at 1638 regarding medications, Dr. Boswell stated she would stop to check on patient. 1643 Dr. Boswell at bedside to assess pt. Bakri balloon has 100ml of blood in bag that was not present when calling Dr. Boswell. Additional 2fluid added to bakri for a total of 500ml. Dr. Boswell order TXA along with additional bag of pitocin at 125ml/hr. Labs ordered at this time. MD remained at bedside to assist with fundal checks, bedside ultrasound bakri placement and further assessment of pt VS and bleeding.

## 2018-12-15 NOTE — PLAN OF CARE
Problem: Patient Care Overview  Goal: Plan of Care Review  Outcome: Ongoing (interventions implemented as appropriate)   12/15/18 1840   Coping/Psychosocial   Plan of Care Reviewed With patient   Plan of Care Review   Progress improving     Goal: Individualization and Mutuality  Outcome: Ongoing (interventions implemented as appropriate)   12/15/18 0659   Individualization   Patient Specific Goals (Include Timeframe) pain management and healthy mom and baby       Problem: Labor (Cervical Ripen, Induct, Augment) (Adult,Obstetrics,Pediatric)  Goal: Signs and Symptoms of Listed Potential Problems Will be Absent, Minimized or Managed (Labor)  Outcome: Unable to achieve outcome(s) by discharge Date Met: 12/15/18   12/15/18 1840   Goal/Outcome Evaluation   Problems Assessed (Labor) all   Problems Present (Labor) none;other (see comments)  (Failure to progress. C/s)       Problem: Hypertensive Disorders in Pregnancy (Adult,Obstetrics,Pediatric)  Goal: Signs and Symptoms of Listed Potential Problems Will be Absent, Minimized or Managed (Hypertensive Disorders in Pregnancy)  Outcome: Ongoing (interventions implemented as appropriate)   12/15/18 1840   Goal/Outcome Evaluation   Problems Assessed (Hypertensive Disorders in Pregnancy) all   Problems Present (Hypertensive/in PG) none       Problem: Fall Risk,  (Adult,Obstetrics,Pediatric)  Goal: Identify Related Risk Factors and Signs and Symptoms  Outcome: Ongoing (interventions implemented as appropriate)   12/15/18 1840   Fall Risk,  (Adult,Obstetrics,Pediatric)   Related Risk Factors (Fall Risk, ) significant blood loss;medication side effects;fatigue;hypotension   Signs and Symptoms (Fall Risk, ) presence of fall risk factors       Problem: Skin Injury Risk (Adult)  Goal: Identify Related Risk Factors and Signs and Symptoms  Outcome: Ongoing (interventions implemented as appropriate)   12/15/18 1840   Skin Injury Risk (Adult)   Related  Risk Factors (Skin Injury Risk) other (see comments);mobility impaired  (epidurla/spinal)       Problem: Anesthesia/Analgesia, Neuraxial (Obstetrics)  Goal: Signs and Symptoms of Listed Potential Problems Will be Absent, Minimized or Managed (Anesthesia/Analgesia, Neuraxial)  Outcome: Ongoing (interventions implemented as appropriate)   12/15/18 5574   Goal/Outcome Evaluation   Problems Assessed (Neuraxial Anesthesia/Analgesia, OB) all   Problems Present (Neuraxial Anesth OB) none

## 2018-12-15 NOTE — PROGRESS NOTES
Discussed with RN tracing. Pitocin currently at 8, and no change since 2117.  Contractions are approximately every 3 minutes (4 to 5 ctx/10min) with low amplitude (150-190 mVU/10 min over last 30 minutes).  The fetal status is reassuring.  Reviewed with RN that I would try increasing pit slightly to see if we can increase mVU.  If tachysystole arises, we can decrease pitocin, however given low mVU, reassuring fetal tracing, and lack of cervical change would recommend increase by 2mU for labor augmentation.

## 2018-12-16 LAB
ALBUMIN SERPL-MCNC: 2.4 G/DL (ref 3.5–5.2)
ALBUMIN/GLOB SERPL: 1 G/DL
ALP SERPL-CCNC: 123 U/L (ref 39–117)
ALT SERPL W P-5'-P-CCNC: 16 U/L (ref 1–33)
ANION GAP SERPL CALCULATED.3IONS-SCNC: 12 MMOL/L
AST SERPL-CCNC: 24 U/L (ref 1–32)
BASOPHILS # BLD AUTO: 0.03 10*3/MM3 (ref 0–0.2)
BASOPHILS # BLD AUTO: 0.05 10*3/MM3 (ref 0–0.2)
BASOPHILS NFR BLD AUTO: 0.2 % (ref 0–1.5)
BASOPHILS NFR BLD AUTO: 0.2 % (ref 0–1.5)
BILIRUB SERPL-MCNC: 0.3 MG/DL (ref 0.1–1.2)
BUN BLD-MCNC: 16 MG/DL (ref 6–20)
BUN/CREAT SERPL: 13.7 (ref 7–25)
CALCIUM SPEC-SCNC: 8.1 MG/DL (ref 8.6–10.5)
CHLORIDE SERPL-SCNC: 105 MMOL/L (ref 98–107)
CO2 SERPL-SCNC: 20 MMOL/L (ref 22–29)
CREAT BLD-MCNC: 1.17 MG/DL (ref 0.57–1)
CREAT UR-MCNC: 506.8 MG/DL
DEPRECATED RDW RBC AUTO: 45.6 FL (ref 37–54)
DEPRECATED RDW RBC AUTO: 46.8 FL (ref 37–54)
DEPRECATED RDW RBC AUTO: 47.4 FL (ref 37–54)
EOSINOPHIL # BLD AUTO: 0 10*3/MM3 (ref 0–0.7)
EOSINOPHIL # BLD AUTO: 0.01 10*3/MM3 (ref 0–0.7)
EOSINOPHIL NFR BLD AUTO: 0 % (ref 0.3–6.2)
EOSINOPHIL NFR BLD AUTO: 0.1 % (ref 0.3–6.2)
ERYTHROCYTE [DISTWIDTH] IN BLOOD BY AUTOMATED COUNT: 13.9 % (ref 11.7–13)
ERYTHROCYTE [DISTWIDTH] IN BLOOD BY AUTOMATED COUNT: 14.1 % (ref 11.7–13)
ERYTHROCYTE [DISTWIDTH] IN BLOOD BY AUTOMATED COUNT: 14.2 % (ref 11.7–13)
GFR SERPL CREATININE-BSD FRML MDRD: 56 ML/MIN/1.73
GLOBULIN UR ELPH-MCNC: 2.4 GM/DL
GLUCOSE BLD-MCNC: 82 MG/DL (ref 65–99)
HCT VFR BLD AUTO: 22.2 % (ref 35.6–45.5)
HCT VFR BLD AUTO: 24.7 % (ref 35.6–45.5)
HCT VFR BLD AUTO: 26.9 % (ref 35.6–45.5)
HGB BLD-MCNC: 7.4 G/DL (ref 11.9–15.5)
HGB BLD-MCNC: 8.5 G/DL (ref 11.9–15.5)
HGB BLD-MCNC: 9.2 G/DL (ref 11.9–15.5)
IMM GRANULOCYTES # BLD: 0.04 10*3/MM3 (ref 0–0.03)
IMM GRANULOCYTES NFR BLD: 0.3 % (ref 0–0.5)
LYMPHOCYTES # BLD AUTO: 2.32 10*3/MM3 (ref 0.9–4.8)
LYMPHOCYTES # BLD AUTO: 3.36 10*3/MM3 (ref 0.9–4.8)
LYMPHOCYTES NFR BLD AUTO: 14.9 % (ref 19.6–45.3)
LYMPHOCYTES NFR BLD AUTO: 16.1 % (ref 19.6–45.3)
MCH RBC QN AUTO: 30.3 PG (ref 26.9–32)
MCH RBC QN AUTO: 31 PG (ref 26.9–32)
MCH RBC QN AUTO: 31.4 PG (ref 26.9–32)
MCHC RBC AUTO-ENTMCNC: 33.3 G/DL (ref 32.4–36.3)
MCHC RBC AUTO-ENTMCNC: 34.2 G/DL (ref 32.4–36.3)
MCHC RBC AUTO-ENTMCNC: 34.4 G/DL (ref 32.4–36.3)
MCV RBC AUTO: 90.6 FL (ref 80.5–98.2)
MCV RBC AUTO: 91 FL (ref 80.5–98.2)
MCV RBC AUTO: 91.1 FL (ref 80.5–98.2)
MONOCYTES # BLD AUTO: 1.28 10*3/MM3 (ref 0.2–1.2)
MONOCYTES # BLD AUTO: 1.69 10*3/MM3 (ref 0.2–1.2)
MONOCYTES NFR BLD AUTO: 8.1 % (ref 5–12)
MONOCYTES NFR BLD AUTO: 8.2 % (ref 5–12)
NEUTROPHILS # BLD AUTO: 11.93 10*3/MM3 (ref 1.9–8.1)
NEUTROPHILS # BLD AUTO: 15.71 10*3/MM3 (ref 1.9–8.1)
NEUTROPHILS NFR BLD AUTO: 75.6 % (ref 42.7–76)
NEUTROPHILS NFR BLD AUTO: 76.6 % (ref 42.7–76)
NRBC BLD MANUAL-RTO: 0 /100 WBC (ref 0–0)
PLATELET # BLD AUTO: 129 10*3/MM3 (ref 140–500)
PLATELET # BLD AUTO: 130 10*3/MM3 (ref 140–500)
PLATELET # BLD AUTO: 135 10*3/MM3 (ref 140–500)
PMV BLD AUTO: 12.7 FL (ref 6–12)
PMV BLD AUTO: 13.2 FL (ref 6–12)
PMV BLD AUTO: 13.6 FL (ref 6–12)
POTASSIUM BLD-SCNC: 4.6 MMOL/L (ref 3.5–5.2)
PROT SERPL-MCNC: 4.8 G/DL (ref 6–8.5)
RBC # BLD AUTO: 2.44 10*6/MM3 (ref 3.9–5.2)
RBC # BLD AUTO: 2.71 10*6/MM3 (ref 3.9–5.2)
RBC # BLD AUTO: 2.97 10*6/MM3 (ref 3.9–5.2)
SODIUM BLD-SCNC: 137 MMOL/L (ref 136–145)
SODIUM UR-SCNC: 44 MMOL/L
WBC NRBC COR # BLD: 15.57 10*3/MM3 (ref 4.5–10.7)
WBC NRBC COR # BLD: 20.81 10*3/MM3 (ref 4.5–10.7)
WBC NRBC COR # BLD: 21.39 10*3/MM3 (ref 4.5–10.7)

## 2018-12-16 PROCEDURE — 85027 COMPLETE CBC AUTOMATED: CPT | Performed by: OBSTETRICS & GYNECOLOGY

## 2018-12-16 PROCEDURE — 84300 ASSAY OF URINE SODIUM: CPT | Performed by: OBSTETRICS & GYNECOLOGY

## 2018-12-16 PROCEDURE — 82570 ASSAY OF URINE CREATININE: CPT | Performed by: OBSTETRICS & GYNECOLOGY

## 2018-12-16 PROCEDURE — 85025 COMPLETE CBC W/AUTO DIFF WBC: CPT | Performed by: OBSTETRICS & GYNECOLOGY

## 2018-12-16 PROCEDURE — 80053 COMPREHEN METABOLIC PANEL: CPT | Performed by: OBSTETRICS & GYNECOLOGY

## 2018-12-16 RX ORDER — BISACODYL 10 MG
10 SUPPOSITORY, RECTAL RECTAL DAILY PRN
Status: DISCONTINUED | OUTPATIENT
Start: 2018-12-16 | End: 2018-12-19 | Stop reason: HOSPADM

## 2018-12-16 RX ORDER — DIPHENHYDRAMINE HCL 25 MG
25 CAPSULE ORAL EVERY 4 HOURS PRN
Status: DISCONTINUED | OUTPATIENT
Start: 2018-12-16 | End: 2018-12-19 | Stop reason: HOSPADM

## 2018-12-16 RX ORDER — SODIUM CHLORIDE 9 MG/ML
125 INJECTION, SOLUTION INTRAVENOUS CONTINUOUS
Status: DISCONTINUED | OUTPATIENT
Start: 2018-12-16 | End: 2018-12-19 | Stop reason: HOSPADM

## 2018-12-16 RX ORDER — SODIUM CHLORIDE, SODIUM LACTATE, POTASSIUM CHLORIDE, CALCIUM CHLORIDE 600; 310; 30; 20 MG/100ML; MG/100ML; MG/100ML; MG/100ML
999 INJECTION, SOLUTION INTRAVENOUS CONTINUOUS
Status: DISCONTINUED | OUTPATIENT
Start: 2018-12-16 | End: 2018-12-19 | Stop reason: HOSPADM

## 2018-12-16 RX ORDER — BISACODYL 5 MG/1
10 TABLET, DELAYED RELEASE ORAL DAILY PRN
Status: DISCONTINUED | OUTPATIENT
Start: 2018-12-16 | End: 2018-12-19 | Stop reason: HOSPADM

## 2018-12-16 RX ORDER — PROMETHAZINE HYDROCHLORIDE 25 MG/ML
12.5 INJECTION, SOLUTION INTRAMUSCULAR; INTRAVENOUS EVERY 6 HOURS PRN
Status: DISCONTINUED | OUTPATIENT
Start: 2018-12-16 | End: 2018-12-19 | Stop reason: HOSPADM

## 2018-12-16 RX ORDER — ALUMINA, MAGNESIA, AND SIMETHICONE 2400; 2400; 240 MG/30ML; MG/30ML; MG/30ML
15 SUSPENSION ORAL EVERY 4 HOURS PRN
Status: DISCONTINUED | OUTPATIENT
Start: 2018-12-16 | End: 2018-12-19 | Stop reason: HOSPADM

## 2018-12-16 RX ORDER — ONDANSETRON 4 MG/1
4 TABLET, FILM COATED ORAL EVERY 6 HOURS PRN
Status: DISCONTINUED | OUTPATIENT
Start: 2018-12-16 | End: 2018-12-19 | Stop reason: HOSPADM

## 2018-12-16 RX ORDER — CALCIUM CARBONATE 200(500)MG
3 TABLET,CHEWABLE ORAL EVERY 6 HOURS PRN
Status: DISCONTINUED | OUTPATIENT
Start: 2018-12-16 | End: 2018-12-19 | Stop reason: HOSPADM

## 2018-12-16 RX ORDER — HYDROXYZINE 50 MG/1
50 TABLET, FILM COATED ORAL EVERY 6 HOURS PRN
Status: DISCONTINUED | OUTPATIENT
Start: 2018-12-16 | End: 2018-12-19 | Stop reason: HOSPADM

## 2018-12-16 RX ORDER — HYDROCODONE BITARTRATE AND ACETAMINOPHEN 10; 325 MG/1; MG/1
1 TABLET ORAL EVERY 4 HOURS PRN
Status: DISCONTINUED | OUTPATIENT
Start: 2018-12-16 | End: 2018-12-19 | Stop reason: HOSPADM

## 2018-12-16 RX ORDER — HYDROCODONE BITARTRATE AND ACETAMINOPHEN 5; 325 MG/1; MG/1
1 TABLET ORAL EVERY 4 HOURS PRN
Status: DISCONTINUED | OUTPATIENT
Start: 2018-12-16 | End: 2018-12-19 | Stop reason: HOSPADM

## 2018-12-16 RX ORDER — PROMETHAZINE HYDROCHLORIDE 25 MG/1
12.5 SUPPOSITORY RECTAL EVERY 6 HOURS PRN
Status: DISCONTINUED | OUTPATIENT
Start: 2018-12-16 | End: 2018-12-19 | Stop reason: HOSPADM

## 2018-12-16 RX ORDER — ONDANSETRON 4 MG/1
4 TABLET, ORALLY DISINTEGRATING ORAL EVERY 6 HOURS PRN
Status: DISCONTINUED | OUTPATIENT
Start: 2018-12-16 | End: 2018-12-19 | Stop reason: HOSPADM

## 2018-12-16 RX ORDER — LANOLIN
CREAM (ML) TOPICAL
Status: DISCONTINUED | OUTPATIENT
Start: 2018-12-16 | End: 2018-12-19 | Stop reason: HOSPADM

## 2018-12-16 RX ORDER — PROMETHAZINE HYDROCHLORIDE 25 MG/1
25 TABLET ORAL EVERY 6 HOURS PRN
Status: DISCONTINUED | OUTPATIENT
Start: 2018-12-16 | End: 2018-12-19 | Stop reason: HOSPADM

## 2018-12-16 RX ORDER — SIMETHICONE 80 MG
80 TABLET,CHEWABLE ORAL 4 TIMES DAILY PRN
Status: DISCONTINUED | OUTPATIENT
Start: 2018-12-16 | End: 2018-12-19 | Stop reason: HOSPADM

## 2018-12-16 RX ORDER — SODIUM CHLORIDE, SODIUM LACTATE, POTASSIUM CHLORIDE, CALCIUM CHLORIDE 600; 310; 30; 20 MG/100ML; MG/100ML; MG/100ML; MG/100ML
125 INJECTION, SOLUTION INTRAVENOUS CONTINUOUS
Status: DISCONTINUED | OUTPATIENT
Start: 2018-12-16 | End: 2018-12-19 | Stop reason: HOSPADM

## 2018-12-16 RX ORDER — ONDANSETRON 2 MG/ML
4 INJECTION INTRAMUSCULAR; INTRAVENOUS EVERY 6 HOURS PRN
Status: DISCONTINUED | OUTPATIENT
Start: 2018-12-16 | End: 2018-12-19 | Stop reason: HOSPADM

## 2018-12-16 RX ORDER — DOCUSATE SODIUM 100 MG/1
100 CAPSULE, LIQUID FILLED ORAL 2 TIMES DAILY PRN
Status: DISCONTINUED | OUTPATIENT
Start: 2018-12-16 | End: 2018-12-19 | Stop reason: HOSPADM

## 2018-12-16 RX ADMIN — NIFEDIPINE 30 MG: 30 TABLET, FILM COATED, EXTENDED RELEASE ORAL at 04:21

## 2018-12-16 RX ADMIN — HYDROCODONE BITARTRATE AND ACETAMINOPHEN 1 TABLET: 5; 325 TABLET ORAL at 20:00

## 2018-12-16 RX ADMIN — LABETALOL HCL 400 MG: 200 TABLET, FILM COATED ORAL at 21:03

## 2018-12-16 RX ADMIN — LABETALOL HCL 400 MG: 200 TABLET, FILM COATED ORAL at 09:09

## 2018-12-16 RX ADMIN — ACETAMINOPHEN 500 MG: 500 TABLET, FILM COATED ORAL at 21:03

## 2018-12-16 RX ADMIN — DOCUSATE SODIUM 100 MG: 100 CAPSULE, LIQUID FILLED ORAL at 21:03

## 2018-12-16 RX ADMIN — ACETAMINOPHEN 500 MG: 500 TABLET, FILM COATED ORAL at 11:03

## 2018-12-16 RX ADMIN — SODIUM CHLORIDE, POTASSIUM CHLORIDE, SODIUM LACTATE AND CALCIUM CHLORIDE 125 ML/HR: 600; 310; 30; 20 INJECTION, SOLUTION INTRAVENOUS at 09:42

## 2018-12-16 RX ADMIN — ACETAMINOPHEN 500 MG: 500 TABLET, FILM COATED ORAL at 04:20

## 2018-12-16 RX ADMIN — SODIUM CHLORIDE, POTASSIUM CHLORIDE, SODIUM LACTATE AND CALCIUM CHLORIDE 500 ML/HR: 600; 310; 30; 20 INJECTION, SOLUTION INTRAVENOUS at 08:47

## 2018-12-16 RX ADMIN — HYDROCODONE BITARTRATE AND ACETAMINOPHEN 1 TABLET: 5; 325 TABLET ORAL at 12:34

## 2018-12-16 NOTE — PLAN OF CARE
Problem: Patient Care Overview  Goal: Plan of Care Review  Outcome: Ongoing (interventions implemented as appropriate)   12/16/18 1542   Coping/Psychosocial   Plan of Care Reviewed With patient   Plan of Care Review   Progress improving     Goal: Individualization and Mutuality  Outcome: Ongoing (interventions implemented as appropriate)   12/16/18 0653 12/16/18 1542   Individualization   Patient Specific Preferences --  stable bleeding, stable vs, adequate output   Patient Specific Goals (Include Timeframe) Breastfeeding help q3 hours --    Patient Specific Interventions --  pain management   Mutuality/Individual Preferences   What Anxieties, Fears, Concerns, or Questions Do You Have About Your Care? denies at this time --    Mutuality/Individual Preferences   How to Address Anxieties/Fears --  MD at bedside throughout day to discuss plan of care and assess     Goal: Discharge Needs Assessment  Outcome: Ongoing (interventions implemented as appropriate)   12/14/18 1756   Discharge Needs Assessment   Readmission Within the Last 30 Days no previous admission in last 30 days   Concerns to be Addressed no discharge needs identified   Patient/Family Anticipates Transition to home with family   Patient/Family Anticipated Services at Transition none   Transportation Concerns car, none   Transportation Anticipated family or friend will provide   Anticipated Changes Related to Illness none   Equipment Needed After Discharge none   Disability   Equipment Currently Used at Home none       Problem: Hypertensive Disorders in Pregnancy (Adult,Obstetrics,Pediatric)  Goal: Signs and Symptoms of Listed Potential Problems Will be Absent, Minimized or Managed (Hypertensive Disorders in Pregnancy)  Outcome: Ongoing (interventions implemented as appropriate)   12/16/18 1542   Goal/Outcome Evaluation   Problems Assessed (Hypertensive Disorders in Pregnancy) all   Problems Present (Hypertensive/in PG) none       Problem: Fall Risk,   (Adult,Obstetrics,Pediatric)  Goal: Identify Related Risk Factors and Signs and Symptoms  Outcome: Ongoing (interventions implemented as appropriate)   18 0653 18 1542   Fall Risk,  (Adult,Obstetrics,Pediatric)   Related Risk Factors (Fall Risk, ) medication side effects;pregnancy weight gain;prolonged bed rest;regional anesthesia;significant blood loss --    Signs and Symptoms (Fall Risk, ) --  presence of fall risk factors       Problem: Breastfeeding (Adult,Obstetrics,Pediatric)  Goal: Signs and Symptoms of Listed Potential Problems Will be Absent, Minimized or Managed (Breastfeeding)  Outcome: Ongoing (interventions implemented as appropriate)   18 1542   Goal/Outcome Evaluation   Problems Assessed (Breastfeeding) all   Problems Present (Breastfeeding) none

## 2018-12-16 NOTE — LACTATION NOTE
LC follow up.  Pt moved to mother/baby unit.  HGP brought to room and reviewed use. Pt deines questions at this time and will call LC as needed.

## 2018-12-16 NOTE — PROGRESS NOTES
Bakri balloon removed.  No bleeding noted upon removal with fundal pressure.  100mL of dark red blood in bag . Uterus firm.  Cohen with approx 600mL yellow urine.  Pt denies feeling dizzy/lightheaded.  Blood pressures are lower, however still normal range and normal MAP.  HR 80's to 90s.  Will continue to monitor symptoms and repeat CBC with lightheadedness/dizziness or other concerns. Plan to get out of bed and to bathroom prior to transfer to postpartum.  Leave in cohen until 4PM and can remove if adequate urine output.  Pt and family in agreement with plan.

## 2018-12-16 NOTE — ANESTHESIA POSTPROCEDURE EVALUATION
"Patient: Callie Parks    Procedure Summary     Date:  18 [facility] / [facility]    Anesthesia Start:  1155 Anesthesia Stop:  12/15/18 163    Procedure:   SECTION PRIMARY (N/A Abdomen) Diagnosis:      Surgeon:  Felicia Boswell MD Provider:  Efra Miller MD    Anesthesia Type:  epidural ASA Status:  3          Anesthesia Type: epidural  Last vitals  BP   (!) 153/104 (12/15/18 2049)   Temp   37.1 °C (98.8 °F) (12/15/18 1933)   Pulse   98 (12/15/18 2113)   Resp   16 (12/15/18 2100)     SpO2   96 % (12/15/18 2113)     Post Anesthesia Care and Evaluation    Patient location during evaluation: bedside  Patient participation: complete - patient participated  Level of consciousness: awake and alert  Pain management: adequate  Airway patency: patent  Anesthetic complications: No anesthetic complications    Cardiovascular status: acceptable  Respiratory status: acceptable  Hydration status: acceptable    Comments: BP (!) 153/104   Pulse 98   Temp 37.1 °C (98.8 °F) (Oral)   Resp 16   Ht 167.6 cm (66\")   Wt 104 kg (229 lb 6.4 oz)   LMP 2018 (Exact Date)   SpO2 96%   Breastfeeding? Yes   BMI 37.03 kg/m²       "

## 2018-12-16 NOTE — NURSING NOTE
When assessing the intake and output of patient Dr. Boswell and I were made aware the blood loss volume was incorrect in the OR. At 1545 and 1550 800ml and 1000ml were inputted. The 1550 was not an additional 1000ml it was a total of 1000ml for the case. The total blood volume loss for the case was 1500ml.

## 2018-12-16 NOTE — PLAN OF CARE
Problem: Patient Care Overview  Goal: Plan of Care Review  Outcome: Ongoing (interventions implemented as appropriate)   18   Coping/Psychosocial   Plan of Care Reviewed With patient;spouse   Plan of Care Review   Progress improving   OTHER   Outcome Summary Patient with stable VS at this time and stable bleeding. Pain well controlled and breastfeeding promoted.     Goal: Individualization and Mutuality  Outcome: Ongoing (interventions implemented as appropriate)   18   Individualization   Patient Specific Preferences Stable bleeding, increased urine output, decreased BP's, breastfeeding   Patient Specific Goals (Include Timeframe) Breastfeeding help q3 hours   Patient Specific Interventions Scheduled BP medications, Bakri balloon managment, fluids, pain management.   Mutuality/Individual Preferences   What Anxieties, Fears, Concerns, or Questions Do You Have About Your Care? denies at this time       Problem: Hypertensive Disorders in Pregnancy (Adult,Obstetrics,Pediatric)  Goal: Signs and Symptoms of Listed Potential Problems Will be Absent, Minimized or Managed (Hypertensive Disorders in Pregnancy)  Outcome: Ongoing (interventions implemented as appropriate)   18   Goal/Outcome Evaluation   Problems Assessed (Hypertensive Disorders in Pregnancy) all   Problems Present (Hypertensive/in PG) none       Problem: Fall Risk,  (Adult,Obstetrics,Pediatric)  Goal: Identify Related Risk Factors and Signs and Symptoms  Outcome: Ongoing (interventions implemented as appropriate)   18   Fall Risk,  (Adult,Obstetrics,Pediatric)   Related Risk Factors (Fall Risk, ) medication side effects;pregnancy weight gain;prolonged bed rest;regional anesthesia;significant blood loss   Signs and Symptoms (Fall Risk, ) presence of fall risk factors     Goal: Absence of Maternal Fall  Outcome: Ongoing (interventions implemented as appropriate)   18    Fall Risk,  (Adult,Obstetrics,Pediatric)   Absence of Maternal Fall achieves outcome     Goal: Absence of  Fall/Drop  Outcome: Ongoing (interventions implemented as appropriate)   18 0653   Fall Risk,  (Adult,Obstetrics,Pediatric)   Absence of  Fall/Drop achieves outcome       Problem: Skin Injury Risk (Adult)  Goal: Identify Related Risk Factors and Signs and Symptoms  Outcome: Ongoing (interventions implemented as appropriate)   18 06   Skin Injury Risk (Adult)   Related Risk Factors (Skin Injury Risk) edema;medication;mobility impaired;moisture     Goal: Skin Health and Integrity  Outcome: Ongoing (interventions implemented as appropriate)   18 06   Skin Injury Risk (Adult)   Skin Health and Integrity achieves outcome       Problem: Anesthesia/Analgesia, Neuraxial (Obstetrics)  Goal: Signs and Symptoms of Listed Potential Problems Will be Absent, Minimized or Managed (Anesthesia/Analgesia, Neuraxial)  Outcome: Outcome(s) achieved Date Met: 18 06   Goal/Outcome Evaluation   Problems Assessed (Neuraxial Anesthesia/Analgesia, OB) all   Problems Present (Neuraxial Anesth OB) none       Problem:  Delivery (Adult,Obstetrics,Pediatric)  Goal: Signs and Symptoms of Listed Potential Problems Will be Absent, Minimized or Managed ( Delivery)  Outcome: Outcome(s) achieved Date Met: 18 06   Goal/Outcome Evaluation   Problems Assessed ( Delivery) all   Problems Present ( Delivery) other (see comments)  (hemorrhage)       Problem: Breastfeeding (Adult,Obstetrics,Pediatric)  Goal: Signs and Symptoms of Listed Potential Problems Will be Absent, Minimized or Managed (Breastfeeding)  Outcome: Ongoing (interventions implemented as appropriate)   18 06   Goal/Outcome Evaluation   Problems Assessed (Breastfeeding) all   Problems Present (Breastfeeding) none

## 2018-12-16 NOTE — LACTATION NOTE
P1. 37 wkr.  Pt states she has nursed some and was started on supplements for jaundice.  Pt had PPH.  Discussed using HGP at least 3x/day and more if she does not feel like baby nursed well.  Reviewed diet, importance of rest, feeding patterns, and how to know infant getting enough.  Encouraged pt to call LC for feeding observation.

## 2018-12-16 NOTE — PROGRESS NOTES
Section Progress Note    Assessment/Plan     Status post  section: Postoperative course complicated by postpartum hemorrhage, ARTURO secondary to pre-renal state     Continue current care, doing well from pain management perspective at this time.    Postpartum hemorrhage: pt received pitocin, hemabate, TXA, Bakri balloon.  Balloon in place with ~25mL dark red blood in bag.  Will plan to deflate over the course of next several hours and monitor bleeding carefully.  Uterus firm and at umbilicus.      Acute blood loss anemia: HgB trended down from 13.2 to 8.5 this AM. EBL from procedure/hemorrhage was total of 1750mL with an additional 100mL from Bakri overnight.  Intake/Output flowsheet has additional 1800mL representing a tabulation of canister in the OR not a total EBL causing erroneously high EBL read.  Repeat CBC/CMP in morning.  Pt asymptomatic at this time and no signs of active bleeding.    ARTURO/low urine output: FeNA 0.1%, likely pre-renal due to hemorrhage/prolonged IOL.  Per note yesterday, I/Os are discrepant based on MAR vs. Flowsheet.  Will bolus this AM, continue mIVF, and monitor closely with cohen in place.  Urine looks much less concentrated than yesterday.      Superimposed preeclampsia: BP mild range. Pt is continuing on her labetalol 400mg BID and Procardia 30XL.  Pre-pregnancy she was on ARB, switched to Procardia 60XL when trying to conceive.  Will monitor BP after acute period and consider switching back to single agent therapy if appropriate.      Rh status: O positive  Rubella: Not immune  Gender: Female    Subjective     Postpartum Day 1:  Delivery    The patient feels tired. The patient denies emotional concerns. Pain is well controlled with current medications, only taking tylenol. The baby iswell.Urinary output is inadequate, bolus infusing. The patient is ambulating well. The patient is tolerating a normal diet. Patient denies flatus.    Objective     Vital signs in  last 24 hours:  Temp:  [97.8 °F (36.6 °C)-98.8 °F (37.1 °C)] 98.4 °F (36.9 °C)  Heart Rate:  [] 93  Resp:  [15-18] 18  BP: (119-184)/() 157/84      General:    alert, appears stated age and cooperative   CV: RRR, no m/r/g   Lungs: CTAB, no wheezes, no respiratory distress   Bowel Sounds:  active   Lochia:  appropriate   Uterine Fundus:   firm   Incision:  clean bandage in place   DVT Evaluation:  No evidence of DVT seen on physical exam.     Lab Results   Component Value Date    WBC 20.81 (H) 12/16/2018    HGB 8.5 (L) 12/16/2018    HCT 24.7 (L) 12/16/2018    MCV 91.1 12/16/2018     (L) 12/16/2018         Felicia Boswell MD  12/16/2018  9:36 AM

## 2018-12-16 NOTE — NURSING NOTE
Pt. breastfeed bilaterally for approximately 10 minutes on each side. Baby replaced in biliblanket. Pt. resting comfortably in bed at this time.

## 2018-12-17 PROBLEM — N17.9 AKI (ACUTE KIDNEY INJURY) (HCC): Status: ACTIVE | Noted: 2018-12-17

## 2018-12-17 PROBLEM — Z34.90 PREGNANCY: Status: RESOLVED | Noted: 2018-11-22 | Resolved: 2018-12-17

## 2018-12-17 PROBLEM — Z98.891 STATUS POST CESAREAN DELIVERY: Status: ACTIVE | Noted: 2018-12-17

## 2018-12-17 LAB
ABO + RH BLD: NORMAL
ABO + RH BLD: NORMAL
ALBUMIN SERPL-MCNC: 2.2 G/DL (ref 3.5–5.2)
ALBUMIN/GLOB SERPL: 0.8 G/DL
ALP SERPL-CCNC: 112 U/L (ref 39–117)
ALT SERPL W P-5'-P-CCNC: 14 U/L (ref 1–33)
ANION GAP SERPL CALCULATED.3IONS-SCNC: 10.8 MMOL/L
AST SERPL-CCNC: 21 U/L (ref 1–32)
BH BB BLOOD EXPIRATION DATE: NORMAL
BH BB BLOOD EXPIRATION DATE: NORMAL
BH BB BLOOD TYPE BARCODE: 5100
BH BB BLOOD TYPE BARCODE: 5100
BH BB DISPENSE STATUS: NORMAL
BH BB DISPENSE STATUS: NORMAL
BH BB PRODUCT CODE: NORMAL
BH BB PRODUCT CODE: NORMAL
BH BB UNIT NUMBER: NORMAL
BH BB UNIT NUMBER: NORMAL
BILIRUB SERPL-MCNC: 0.2 MG/DL (ref 0.1–1.2)
BUN BLD-MCNC: 14 MG/DL (ref 6–20)
BUN/CREAT SERPL: 20 (ref 7–25)
CALCIUM SPEC-SCNC: 8.2 MG/DL (ref 8.6–10.5)
CHLORIDE SERPL-SCNC: 107 MMOL/L (ref 98–107)
CO2 SERPL-SCNC: 22.2 MMOL/L (ref 22–29)
CREAT BLD-MCNC: 0.7 MG/DL (ref 0.57–1)
DEPRECATED RDW RBC AUTO: 47.1 FL (ref 37–54)
ERYTHROCYTE [DISTWIDTH] IN BLOOD BY AUTOMATED COUNT: 14.3 % (ref 11.7–13)
GFR SERPL CREATININE-BSD FRML MDRD: 101 ML/MIN/1.73
GLOBULIN UR ELPH-MCNC: 2.7 GM/DL
GLUCOSE BLD-MCNC: 87 MG/DL (ref 65–99)
HCT VFR BLD AUTO: 21.5 % (ref 35.6–45.5)
HGB BLD-MCNC: 7.3 G/DL (ref 11.9–15.5)
MCH RBC QN AUTO: 30.9 PG (ref 26.9–32)
MCHC RBC AUTO-ENTMCNC: 34 G/DL (ref 32.4–36.3)
MCV RBC AUTO: 91.1 FL (ref 80.5–98.2)
PLATELET # BLD AUTO: 128 10*3/MM3 (ref 140–500)
PMV BLD AUTO: 12.7 FL (ref 6–12)
POTASSIUM BLD-SCNC: 3.8 MMOL/L (ref 3.5–5.2)
PROT SERPL-MCNC: 4.9 G/DL (ref 6–8.5)
RBC # BLD AUTO: 2.36 10*6/MM3 (ref 3.9–5.2)
SODIUM BLD-SCNC: 140 MMOL/L (ref 136–145)
UNIT  ABO: NORMAL
UNIT  ABO: NORMAL
UNIT  RH: NORMAL
UNIT  RH: NORMAL
WBC NRBC COR # BLD: 13.6 10*3/MM3 (ref 4.5–10.7)

## 2018-12-17 PROCEDURE — 99024 POSTOP FOLLOW-UP VISIT: CPT | Performed by: OBSTETRICS & GYNECOLOGY

## 2018-12-17 PROCEDURE — 80053 COMPREHEN METABOLIC PANEL: CPT | Performed by: OBSTETRICS & GYNECOLOGY

## 2018-12-17 PROCEDURE — 85027 COMPLETE CBC AUTOMATED: CPT | Performed by: OBSTETRICS & GYNECOLOGY

## 2018-12-17 RX ADMIN — LABETALOL HCL 400 MG: 200 TABLET, FILM COATED ORAL at 09:09

## 2018-12-17 RX ADMIN — HYDROCODONE BITARTRATE AND ACETAMINOPHEN 1 TABLET: 10; 325 TABLET ORAL at 21:38

## 2018-12-17 RX ADMIN — HYDROCODONE BITARTRATE AND ACETAMINOPHEN 1 TABLET: 10; 325 TABLET ORAL at 09:36

## 2018-12-17 RX ADMIN — HYDROCODONE BITARTRATE AND ACETAMINOPHEN 1 TABLET: 10; 325 TABLET ORAL at 05:19

## 2018-12-17 RX ADMIN — DOCUSATE SODIUM 100 MG: 100 CAPSULE, LIQUID FILLED ORAL at 20:17

## 2018-12-17 RX ADMIN — HYDROCODONE BITARTRATE AND ACETAMINOPHEN 1 TABLET: 10; 325 TABLET ORAL at 13:35

## 2018-12-17 RX ADMIN — NIFEDIPINE 30 MG: 30 TABLET, FILM COATED, EXTENDED RELEASE ORAL at 05:19

## 2018-12-17 RX ADMIN — HYDROCODONE BITARTRATE AND ACETAMINOPHEN 1 TABLET: 10; 325 TABLET ORAL at 17:46

## 2018-12-17 RX ADMIN — LABETALOL HCL 400 MG: 200 TABLET, FILM COATED ORAL at 20:17

## 2018-12-17 RX ADMIN — HYDROCODONE BITARTRATE AND ACETAMINOPHEN 1 TABLET: 10; 325 TABLET ORAL at 00:16

## 2018-12-17 RX ADMIN — ACETAMINOPHEN 500 MG: 500 TABLET, FILM COATED ORAL at 02:45

## 2018-12-17 RX ADMIN — DOCUSATE SODIUM 100 MG: 100 CAPSULE, LIQUID FILLED ORAL at 09:37

## 2018-12-17 NOTE — LACTATION NOTE
Mom reports baby will try to latch and not stay on the breast. She is pumping and supplementing with pumped milk and formula. Ped wanted baby to have supplement formula because baby is jaundice. Encouraged mom to call if needing assistance.  Lactation Consult Note    Evaluation Completed: 2018 5:31 PM  Patient Name: Callie Parks  :  1990  MRN:  [id number]     REFERRAL  INFORMATION:                                         DELIVERY HISTORY:  Infant First Feeding: breastfeeding       Skin to skin initiation date/time:        Skin to skin end date/time:              MATERNAL ASSESSMENT:                               INFANT ASSESSMENT:  Information for the patient's :  Callie ParksTorie [[id number]]   No past medical history on file.                                                                                                                                MATERNAL INFANT FEEDING:                                                                       EQUIPMENT TYPE:                                 BREAST PUMPING:          LACTATION REFERRALS:

## 2018-12-17 NOTE — PROGRESS NOTES
CC: Patient is postoperative day number 2.  S: Patient without complaints.  No events overnight.  She states that she has some abdominal pain with ambulation, but is comfortable when at rest.  She is tolerating a regular diet. Passing flatus. She is ambulating and voiding without difficulty.  Lochia has been minimal to moderate today.  Seems to be improving.  She is planning to breast-feed.    O:  Vitals:    12/17/18 0300 12/17/18 0519 12/17/18 0804 12/17/18 0909   BP: 134/89 130/84 131/84 143/89   BP Location: Right arm Right arm Left arm Left arm   Patient Position: Sitting Lying Lying Lying   Pulse: 89 87 88 98   Resp: 16 16 18   Temp: 98.6 °F (37 °C)  98.2 °F (36.8 °C)    TempSrc: Oral  Oral    SpO2:       Weight:       Height:         I/O last 3 completed shifts:  In: 5084 [P.O.:1050; I.V.:4034]  Out: 4460 [Urine:4360; Blood:100]  No intake/output data recorded.    General: No acute distress, awake and oriented ×3  Fundus: Firm, nontender, below the umbilicus  Abdomen: Soft, nontender, nondistended  Dressing: Clean and dry  Extremities: No calf tenderness, no Homans sign, 1+ lower extremity edema. SCDs in place    Rh+, rubella nonimmune, female infant    Results from last 7 days   Lab Units  12/17/18   0616  12/16/18   0613  12/15/18   0906  12/13/18   2141   SODIUM mmol/L  140  137  138  140   BUN mg/dL  14  16  8  13   CREATININE mg/dL  0.70  1.17*  0.60  0.52*   GLUCOSE mg/dL  87  82  76  95         Estimated Creatinine Clearance: 148.4 mL/min (by C-G formula based on SCr of 0.7 mg/dL).  Results from last 7 days   Lab Units  12/17/18   0616  12/16/18   2051  12/16/18   0613  12/16/18   0013  12/15/18   1730   WBC 10*3/mm3  13.60*  15.57*  20.81*  21.39*  18.83*   HEMOGLOBIN g/dL  7.3*  7.4*  8.5*  9.2*  10.7*   PLATELETS 10*3/mm3  128*  135*  130*  129*  119*     Results from last 7 days   Lab Units  12/15/18   1730   INR   1.10     Results from last 7 days   Lab Units  12/17/18   0616  12/16/18   0613   12/15/18   0906  12/13/18   2141  12/10/18   1315   ALT (SGPT) U/L  14  16  25  28  32   AST (SGOT) U/L  21  24  23  31  30       Current Facility-Administered Medications:   •  acetaminophen (TYLENOL) tablet 500 mg, 500 mg, Oral, Q6H, Felicia Boswell MD, 500 mg at 12/17/18 0245  •  all purpose nipple ointment, , Topical, 4x Daily PRN, Felicia Boswell MD  •  aluminum-magnesium hydroxide-simethicone (MAALOX MAX) 400-400-40 MG/5ML suspension 15 mL, 15 mL, Oral, Q4H PRN, Felicia Boswell MD  •  bisacodyl (DULCOLAX) EC tablet 10 mg, 10 mg, Oral, Daily PRN, Felicia Boswell MD  •  bisacodyl (DULCOLAX) suppository 10 mg, 10 mg, Rectal, Daily PRN, Felicia Boswell MD  •  calcium carbonate (TUMS) chewable tablet 500 mg (200 mg elemental), 3 tablet, Oral, Q6H PRN, Felicia Boswell MD  •  diphenhydrAMINE (BENADRYL) capsule 25 mg, 25 mg, Oral, Q4H PRN, Felicia Boswell MD  •  docusate sodium (COLACE) capsule 100 mg, 100 mg, Oral, BID PRN, Felicia Boswell MD, 100 mg at 12/17/18 0937  •  HYDROcodone-acetaminophen (NORCO) 5-325 MG per tablet 1 tablet, 1 tablet, Oral, Q4H PRN, 1 tablet at 12/16/18 2000 **OR** HYDROcodone-acetaminophen (NORCO)  MG per tablet 1 tablet, 1 tablet, Oral, Q4H PRN, Felicia Boswell MD, 1 tablet at 12/17/18 0936  •  hydrocortisone (ANUSOL-HC) 2.5 % rectal cream, , Rectal, TID PRN, Felicia Boswell MD  •  hydrOXYzine (ATARAX) tablet 50 mg, 50 mg, Oral, Q6H PRN, Felicia Boswell MD  •  labetalol (NORMODYNE) tablet 400 mg, 400 mg, Oral, BID, Felicia Boswell MD, 400 mg at 12/17/18 0909  •  lactated ringers infusion, 125 mL/hr, Intravenous, Continuous, Felicia Boswell MD, Last Rate: 125 mL/hr at 12/16/18 0942, 125 mL/hr at 12/16/18 0942  •  lactated ringers infusion, 999 mL/hr, Intravenous, Continuous, Felicia Boswell MD, Last Rate: 125 mL/hr at 12/15/18 1712, 125 mL/hr at 12/15/18 1712  •  lactated ringers infusion, 125 mL/hr, Intravenous, Continuous, Felicia Boswell MD  •  lactated ringers infusion, 999 mL/hr,  Intravenous, Continuous, Felicia Boswell MD, Last Rate: 500 mL/hr at 18 0847, 500 mL/hr at 18 0847  •  lanolin cream, , Topical, Q1H PRN, Felicia Boswell MD  •  loperamide (IMODIUM) capsule 4 mg, 4 mg, Oral, 4x Daily PRN, Felicia Boswell MD  •  magnesium hydroxide (MILK OF MAGNESIA) suspension 2400 mg/10mL 10 mL, 10 mL, Oral, Daily PRN, Felicia Boswell MD  •  measles, mumps and rubella vaccine (MMR) injection 0.5 mL, 0.5 mL, Subcutaneous, During Hospitalization, Felicia Boswell MD  •  NIFEdipine XL (PROCARDIA XL) 24 hr tablet 30 mg, 30 mg, Oral, Daily, Felicia Boswell MD, 30 mg at 18 0519  •  ondansetron (ZOFRAN) tablet 4 mg, 4 mg, Oral, Q6H PRN **OR** ondansetron ODT (ZOFRAN-ODT) disintegrating tablet 4 mg, 4 mg, Oral, Q6H PRN **OR** ondansetron (ZOFRAN) injection 4 mg, 4 mg, Intravenous, Q6H PRN, Felicia Boswell MD  •  oxyCODONE-acetaminophen (PERCOCET)  MG per tablet 1 tablet, 1 tablet, Oral, Q4H PRN, Felicia Boswell MD  •  oxyCODONE-acetaminophen (PERCOCET) 5-325 MG per tablet 1 tablet, 1 tablet, Oral, Q4H PRN, Felicia Boswell MD  •  promethazine (PHENERGAN) tablet 25 mg, 25 mg, Oral, Q6H PRN **OR** promethazine (PHENERGAN) injection 12.5 mg, 12.5 mg, Intramuscular, Q6H PRN **OR** promethazine (PHENERGAN) suppository 12.5 mg, 12.5 mg, Rectal, Q6H PRN, Felicia Boswell MD  •  simethicone (MYLICON) chewable tablet 80 mg, 80 mg, Oral, 4x Daily PRN, Felicia Boswell MD  •  sodium chloride 0.9 % infusion, 125 mL/hr, Intravenous, Continuous, Felicia Boswell MD  •  Tranexamic Acid 1,000 mg in sodium chloride 0.9 % 100 mL, 1,000 mg, Intravenous, Once, Felicia Boswell MD    Assessment:  1.  28-year-old  1 para 1 status post primary low transverse  delivery, postoperative day #2  2.  Postpartum hemorrhage, status post utero tonics and Bakri balloon, resolved with appropriate lochia at this time  3.  Superimposed preeclampsia, blood pressures currently stable on medication  4.  Acute  kidney injury secondary to hypovolemia, resolved  5.  Mild thrombocytopenia, likely gestational, stable    Plan:  1.  Patient appears to be doing much better today.  She is encouraged to remove her dressing today.  Encourage ambulation.  We will repeat CBC tomorrow.  Hemoglobin is 7.3 today, but she is asymptomatic and not tachycardic.  We will start oral iron.  Encourage hydration.  2. Likely home in 1-2 days

## 2018-12-17 NOTE — PLAN OF CARE
Problem: Patient Care Overview  Goal: Plan of Care Review   12/16/18 2026   Coping/Psychosocial   Plan of Care Reviewed With patient   Plan of Care Review   Progress improving

## 2018-12-18 LAB
BASOPHILS # BLD AUTO: 0.03 10*3/MM3 (ref 0–0.2)
BASOPHILS NFR BLD AUTO: 0.2 % (ref 0–1.5)
DEPRECATED RDW RBC AUTO: 47.7 FL (ref 37–54)
EOSINOPHIL # BLD AUTO: 0.08 10*3/MM3 (ref 0–0.7)
EOSINOPHIL NFR BLD AUTO: 0.6 % (ref 0.3–6.2)
ERYTHROCYTE [DISTWIDTH] IN BLOOD BY AUTOMATED COUNT: 14.3 % (ref 11.7–13)
HCT VFR BLD AUTO: 22.1 % (ref 35.6–45.5)
HGB BLD-MCNC: 7.3 G/DL (ref 11.9–15.5)
IMM GRANULOCYTES # BLD: 0.04 10*3/MM3 (ref 0–0.03)
IMM GRANULOCYTES NFR BLD: 0.3 % (ref 0–0.5)
LYMPHOCYTES # BLD AUTO: 2.78 10*3/MM3 (ref 0.9–4.8)
LYMPHOCYTES NFR BLD AUTO: 22.2 % (ref 19.6–45.3)
MCH RBC QN AUTO: 30.4 PG (ref 26.9–32)
MCHC RBC AUTO-ENTMCNC: 33 G/DL (ref 32.4–36.3)
MCV RBC AUTO: 92.1 FL (ref 80.5–98.2)
MONOCYTES # BLD AUTO: 0.96 10*3/MM3 (ref 0.2–1.2)
MONOCYTES NFR BLD AUTO: 7.6 % (ref 5–12)
NEUTROPHILS # BLD AUTO: 8.7 10*3/MM3 (ref 1.9–8.1)
NEUTROPHILS NFR BLD AUTO: 69.4 % (ref 42.7–76)
PLATELET # BLD AUTO: 181 10*3/MM3 (ref 140–500)
PMV BLD AUTO: 12.2 FL (ref 6–12)
RBC # BLD AUTO: 2.4 10*6/MM3 (ref 3.9–5.2)
WBC NRBC COR # BLD: 12.55 10*3/MM3 (ref 4.5–10.7)

## 2018-12-18 PROCEDURE — 85025 COMPLETE CBC W/AUTO DIFF WBC: CPT | Performed by: OBSTETRICS & GYNECOLOGY

## 2018-12-18 RX ADMIN — DOCUSATE SODIUM 100 MG: 100 CAPSULE, LIQUID FILLED ORAL at 10:18

## 2018-12-18 RX ADMIN — LABETALOL HCL 400 MG: 200 TABLET, FILM COATED ORAL at 08:06

## 2018-12-18 RX ADMIN — SIMETHICONE CHEW TAB 80 MG 80 MG: 80 TABLET ORAL at 10:18

## 2018-12-18 RX ADMIN — HYDROCODONE BITARTRATE AND ACETAMINOPHEN 1 TABLET: 10; 325 TABLET ORAL at 20:58

## 2018-12-18 RX ADMIN — NIFEDIPINE 30 MG: 30 TABLET, FILM COATED, EXTENDED RELEASE ORAL at 04:32

## 2018-12-18 RX ADMIN — HYDROCODONE BITARTRATE AND ACETAMINOPHEN 1 TABLET: 10; 325 TABLET ORAL at 04:32

## 2018-12-18 RX ADMIN — DOCUSATE SODIUM 100 MG: 100 CAPSULE, LIQUID FILLED ORAL at 20:58

## 2018-12-18 RX ADMIN — LABETALOL HCL 400 MG: 200 TABLET, FILM COATED ORAL at 20:57

## 2018-12-18 RX ADMIN — OXYCODONE AND ACETAMINOPHEN 1 TABLET: 5; 325 TABLET ORAL at 10:18

## 2018-12-18 NOTE — PROGRESS NOTES
Section Progress Note    Assessment/Plan     Status post  section: Postoperative course complicated by postpartum hemorrhage, ARTURO secondary to pre-renal state     Continue current care, doing well today    Postpartum hemorrhage: s/p Hemabate, pitocin, cytotec, TXA, Bakri balloon (Removed POD 1)  Acute blood loss anemia: HgB trended down from 13.2 to 8.5 to 7.3. EBL from procedure/hemorrhage was total of 1750mL with an additional 100mL from Bakri overnight.  Pt is asymptomatic.  Continue Iron supplementation  ARTURO, resolved with hydration  Superimposed preeclampsia: BP mild range. Pt is continuing on her labetalol 400mg BID and Procardia 30XL.  Pre-pregnancy she was on ARB, switched to Procardia 60XL when trying to conceive.  Recommend continuing curretn regimen for now as this medication is safe in breastfeeding.      Rh status: O positive  Rubella: Not immune  Gender: Female    Subjective     Postpartum Day 3:  Delivery    The patient feels tired. The patient denies emotional concerns. Pain is well controlled with current medications.  She is breastfeeding. The baby is well but having issues with jaundice.  Urinary output has been adequate.  The patient is ambulating well. The patient is tolerating a normal diet. Patient reports flatus.    Objective     Vital signs in last 24 hours:  Temp:  [97.8 °F (36.6 °C)-98.9 °F (37.2 °C)] 97.8 °F (36.6 °C)  Heart Rate:  [83-91] 83  Resp:  [16-18] 18  BP: (123-148)/(80-91) 123/82      General:    alert, appears stated age and cooperative   CV: RRR, no m/r/g   Lungs: CTAB, no wheezes, no respiratory distress   Bowel Sounds:  active   Lochia:  appropriate   Uterine Fundus:   firm   Incision:  clean and dry without erythema/induration/drainage   DVT Evaluation:  No evidence of DVT seen on physical exam.     Lab Results   Component Value Date    WBC 12.55 (H) 2018    HGB 7.3 (L) 2018    HCT 22.1 (L) 2018    MCV 92.1 2018      12/18/2018         Felicia Boswell MD  12/18/2018  11:36 AM

## 2018-12-19 ENCOUNTER — TELEPHONE (OUTPATIENT)
Dept: OBSTETRICS AND GYNECOLOGY | Facility: CLINIC | Age: 28
End: 2018-12-19

## 2018-12-19 VITALS
OXYGEN SATURATION: 94 % | TEMPERATURE: 97.9 F | WEIGHT: 229.4 LBS | DIASTOLIC BLOOD PRESSURE: 95 MMHG | HEIGHT: 66 IN | HEART RATE: 81 BPM | BODY MASS INDEX: 36.87 KG/M2 | RESPIRATION RATE: 18 BRPM | SYSTOLIC BLOOD PRESSURE: 145 MMHG

## 2018-12-19 RX ORDER — OXYCODONE HYDROCHLORIDE AND ACETAMINOPHEN 5; 325 MG/1; MG/1
1 TABLET ORAL EVERY 4 HOURS PRN
Qty: 25 TABLET | Refills: 0 | Status: SHIPPED | OUTPATIENT
Start: 2018-12-19 | End: 2018-12-25

## 2018-12-19 RX ORDER — NIFEDIPINE 30 MG/1
30 TABLET, FILM COATED, EXTENDED RELEASE ORAL DAILY
Qty: 30 TABLET | Refills: 1 | Status: SHIPPED | OUTPATIENT
Start: 2018-12-19 | End: 2020-09-16

## 2018-12-19 RX ORDER — FERROUS SULFATE 325(65) MG
325 TABLET ORAL
Qty: 30 TABLET | Refills: 3 | Status: SHIPPED | OUTPATIENT
Start: 2018-12-19 | End: 2019-03-27

## 2018-12-19 RX ORDER — LABETALOL 200 MG/1
400 TABLET, FILM COATED ORAL 3 TIMES DAILY
Qty: 60 TABLET | Refills: 2 | Status: SHIPPED | OUTPATIENT
Start: 2018-12-19 | End: 2019-01-28

## 2018-12-19 RX ADMIN — HYDROCODONE BITARTRATE AND ACETAMINOPHEN 1 TABLET: 10; 325 TABLET ORAL at 04:37

## 2018-12-19 RX ADMIN — LABETALOL HCL 400 MG: 200 TABLET, FILM COATED ORAL at 08:48

## 2018-12-19 RX ADMIN — MEASLES, MUMPS, AND RUBELLA VIRUS VACCINE LIVE 0.5 ML: 1000; 12500; 1000 INJECTION, POWDER, LYOPHILIZED, FOR SUSPENSION SUBCUTANEOUS at 11:48

## 2018-12-19 RX ADMIN — HYDROCODONE BITARTRATE AND ACETAMINOPHEN 1 TABLET: 10; 325 TABLET ORAL at 08:47

## 2018-12-19 RX ADMIN — NIFEDIPINE 30 MG: 30 TABLET, FILM COATED, EXTENDED RELEASE ORAL at 04:37

## 2018-12-19 RX ADMIN — DOCUSATE SODIUM 100 MG: 100 CAPSULE, LIQUID FILLED ORAL at 08:48

## 2018-12-19 NOTE — LACTATION NOTE
Discharge today.  Pt states she has not had much success with latching.  Discussed latching technique and encouraged pt to call for latch assist today.  Pt continues to pump and milk is coming in.  Baby will go home with bili lights. Instructed pt to follow up in [facility] to continue working on latch.

## 2018-12-19 NOTE — TELEPHONE ENCOUNTER
Pt called, stating she was discharged from the hospital and Dr. Winters wanted her to be seen in 1 week instead of 2. Where can this pt be scheduled at next week or is the pt okay to wait 2 weeks. Please advise.     Pt callback: [phone]

## 2018-12-19 NOTE — PROGRESS NOTES
"Subjective:  Postpartum Day 4:     The patient feels well.  Pain is well controlled with current medications. The baby is well.  Urinary output is adequate. The patient is ambulating well. The patient is tolerating a normal diet. Flatus has been passed.      Objective:    Vital signs in last 24 hours:  Blood pressure 152/96, pulse 87, temperature 97.9 °F (36.6 °C), temperature source Oral, resp. rate 18, height 167.6 cm (66\"), weight 104 kg (229 lb 6.4 oz), last menstrual period 2018, SpO2 94 %, currently breastfeeding.      General:    alert, appears stated age and cooperative   Uterine Fundus:   firm   Incision:  healing well, no significant drainage, no dehiscence, no significant erythema     Labs    Rh + / Female infant    Assessment/Plan:.     Postpartum Day #4, primary  with postpartum hemorrhage, cHTN and hypothyroidism.  - Postoperative.  Patient doing well.  Incision looks good.  Hemoglobin noted, but patient is asymptomatic.  Recommend iron therapy with re-evaluation in 6 to 12 weeks.  - Hypothyroidism.  Patient to resume home dose of synthroid.  She has not been on replacement since inpatient.  - Chronic HTN.  Recent BP are elevated, but as a trend, blood pressures look good and patient feels well.  Recommend Procardia XL 30 mg each day and Labetalol 400 mg TID.  She should follow up with Dr Boswell next week and consider holding Labetalol 24 hours prior to her BP check.    Heber Winters MD    "

## 2018-12-19 NOTE — DISCHARGE SUMMARY
Discharge Summary    Patient Identification:  Name:   Callie Parks  Age:   28 y.o.  :   1990  MRN:  [id number]    Admit Date:      Discharge Date:  2018    Admitting Physician:  Felicia Boswell MD    Discharge Physician:  Heber Winters MD    Admission Diagnosis:  IUP at 37 weeks.  Chronic HTN with superimposed pre-eclampsia.  Hypothyroidism.    Discharge Diagnosis:  Postpartum, delivered via .  Chronic HTN.  Hypothyroidism.  Postpartum hemorrhage with anemia.    Discharge Condition:  Good    Hospital Course:  Patient is a 28 year old female who was admitted for induction of labor secondary to chronic HTN with superimposed pre-eclampsia at 37 weeks.  During induction, patient had an arrest disorder of the active phase.  She was taken to the OR for  and the operative report can be obtained to review the details of her delivery.  Postpartum, she had an immediate hemorrhage that required several uterotonic agents and Bakri balloon placement.  Her bleeding was mitigated with this.  She had a significant anemia that required blood transfusion.  She had transiently abnormal kidney function, likely related to acute blood loss/volume depletion that resolved.  The remainder of her course postpartum was of steady improvement.  Blood pressures improved and patient did well from a postoperative standpoint.  By postpartum day number 4, she was stable for discharge.    Procedures:  Primary C/S.  Postpartum D&C with Bakri balloon placement.    Current Medications:    Medications Prior to Admission   Medication Sig Dispense Refill Last Dose   • aspirin 81 MG chewable tablet Chew 81 mg Daily.   2018 at Unknown time   • Cholecalciferol (VITAMIN D) 2000 units capsule Take  by mouth Daily.   2018 at Unknown time   • levothyroxine (SYNTHROID) 50 MCG tablet Take 1 tablet by mouth Daily. 30 tablet 1 2018 at Unknown time   • Prenatal Vit-Fe Fumarate-FA (PRENATAL, CLASSIC,  VITAMIN) 28-0.8 MG tablet tablet Take 1 tablet by mouth Daily.   12/13/2018 at Unknown time   • vitamin B-12 (CYANOCOBALAMIN) 1000 MCG tablet Take 1,000 mcg by mouth Daily.   12/12/2018 at Unknown time   • vitamin E 1000 UNIT capsule Take 1,000 Units by mouth Daily.   12/13/2018 at Unknown time   • [DISCONTINUED] labetalol (NORMODYNE) 200 MG tablet Take 2 tablets by mouth 2 (Two) Times a Day. 60 tablet 1 12/13/2018 at Unknown time   • [DISCONTINUED] NIFEdipine XL (ADALAT CC) 30 MG 24 hr tablet Take 1 tablet by mouth Daily. 30 tablet 1 12/13/2018 at Unknown time       Discharge Condition:  Good    Discharge Disposition/Location:  Home    Heber Winters MD

## 2018-12-19 NOTE — TELEPHONE ENCOUNTER
She can be scheduled on Wednesday at [facility] or as first patient on 12/27 (can come in at 8:45 if we are open at that time).  It will be for a BP check. Thanks!

## 2018-12-19 NOTE — PLAN OF CARE
Problem: Patient Care Overview  Goal: Plan of Care Review  Outcome: Ongoing (interventions implemented as appropriate)   12/19/18 1304   Coping/Psychosocial   Plan of Care Reviewed With patient   OTHER   Outcome Summary review d/c instructions. using EBP. pain controlled     Goal: Individualization and Mutuality  Outcome: Ongoing (interventions implemented as appropriate)   12/19/18 1304   Individualization   Patient Specific Preferences ready for home     Goal: Discharge Needs Assessment  Outcome: Ongoing (interventions implemented as appropriate)   12/19/18 1304   Discharge Needs Assessment   Readmission Within the Last 30 Days no previous admission in last 30 days   Concerns to be Addressed no discharge needs identified   Patient/Family Anticipates Transition to home   Patient/Family Anticipated Services at Transition none   Transportation Concerns car, none   Transportation Anticipated family or friend will provide   Anticipated Changes Related to Illness none   Equipment Needed After Discharge none   Disability   Equipment Currently Used at Home none       Problem: Breastfeeding (Adult,Obstetrics,Pediatric)  Goal: Signs and Symptoms of Listed Potential Problems Will be Absent, Minimized or Managed (Breastfeeding)  Outcome: Ongoing (interventions implemented as appropriate)   12/19/18 1304   Goal/Outcome Evaluation   Problems Assessed (Breastfeeding) all   Problems Present (Breastfeeding) none

## 2018-12-26 ENCOUNTER — TELEPHONE (OUTPATIENT)
Dept: LACTATION | Facility: HOSPITAL | Age: 28
End: 2018-12-26

## 2018-12-27 ENCOUNTER — OFFICE VISIT (OUTPATIENT)
Dept: OBSTETRICS AND GYNECOLOGY | Facility: CLINIC | Age: 28
End: 2018-12-27

## 2018-12-27 VITALS
HEIGHT: 66 IN | SYSTOLIC BLOOD PRESSURE: 116 MMHG | TEMPERATURE: 97 F | HEART RATE: 72 BPM | DIASTOLIC BLOOD PRESSURE: 79 MMHG | WEIGHT: 199 LBS | BODY MASS INDEX: 31.98 KG/M2

## 2018-12-27 DIAGNOSIS — O10.919 CHRONIC HYPERTENSION AFFECTING PREGNANCY: Primary | ICD-10-CM

## 2018-12-27 PROCEDURE — 0503F POSTPARTUM CARE VISIT: CPT | Performed by: OBSTETRICS & GYNECOLOGY

## 2018-12-27 NOTE — PROGRESS NOTES
"Chief Complaint   Patient presents with   • Postpartum Care     2 wks CS        SUBJECTIVE:   Callie Parks is a 28 y.o.  who presents s/p  Primary Low Transverse  Section on 12/15/18 course complicated by chronic hypertension and postpartum hemorrhage  Reports that she is doing well.  Taking her labetalol twice daily due to feeling like her heart rate is too low to take it three times daily.  Still taking procardia.  Reports BP have mostly been normal.    Bowel and bladder function have returned to normal  Bleeding like a light period  Mood changes none  breast feeding    Past Medical History:   Diagnosis Date   • Elevated liver enzymes    • Gestational hypertension    • Hypertension      Past Surgical History:   Procedure Laterality Date   • ADENOIDECTOMY     •  SECTION N/A 12/15/2018    Procedure:  SECTION PRIMARY;  Surgeon: Felicia Boswell MD;  Location: [facility] LABOR DELIVERY;  Service: Obstetrics/Gynecology   • WISDOM TOOTH EXTRACTION       Social History     Tobacco Use   • Smoking status: Never Smoker   • Smokeless tobacco: Never Used   Substance Use Topics   • Alcohol use: No   • Drug use: No     Family History   Problem Relation Age of Onset   • Breast cancer Neg Hx    • Ovarian cancer Neg Hx    • Uterine cancer Neg Hx    • Colon cancer Neg Hx        Patient Active Problem List   Diagnosis   • High-risk pregnancy   • Chronic hypertension affecting pregnancy   • Subclinical hypothyroidism   • Transaminitis   • Abnormal glucose affecting pregnancy   • Hypertension affecting pregnancy   • Preeclampsia   • Postpartum hemorrhage, delivered, current hospitalization   • ARTURO (acute kidney injury) (CMS/HCC)   • Status post  delivery        OBJECTIVE:   /79   Pulse 72   Temp 97 °F (36.1 °C) (Oral)   Ht 167.6 cm (65.98\")   Wt 90.3 kg (199 lb)   Breastfeeding? Yes   BMI 32.14 kg/m²    Physical Examination:   General appearance - alert, well appearing, and in no " distress  Chest - no tachypnea, retractions or cyanosis  Heart - normal rate and regular rhythm  Abdomen - soft, nontender, nondistended, no masses or organomegaly; Incision healing well, no drainage, no erythema, no hernia, dermabond in place  Pelvic - deferred  Neurological - screening mental status exam normal  Musculoskeletal - no joint tenderness, deformity or swelling  Psychiatric - normal mood and affect    Lab Results   Component Value Date    WBC 12.55 (H) 2018    HGB 7.3 (L) 2018    HCT 22.1 (L) 2018    MCV 92.1 2018     2018        ASSESSMENT:   S/p Primary   BP check    PLAN:   Doing well postpartum  Baby girl doing well  Contraception undecided at this time  At this time, continue pelvic rest and lifting precautions.  BP is normal range.  Reviewed that she can continue labetalol BID and Procardia daily with recording serial BP and review at 2 week incision check  Signs/symptoms of preeclampsia reviewed.

## 2019-01-02 ENCOUNTER — POSTPARTUM VISIT (OUTPATIENT)
Dept: OBSTETRICS AND GYNECOLOGY | Facility: CLINIC | Age: 29
End: 2019-01-02

## 2019-01-02 VITALS
WEIGHT: 196 LBS | HEIGHT: 66 IN | HEART RATE: 70 BPM | BODY MASS INDEX: 31.5 KG/M2 | TEMPERATURE: 97.8 F | SYSTOLIC BLOOD PRESSURE: 139 MMHG | DIASTOLIC BLOOD PRESSURE: 97 MMHG

## 2019-01-02 PROCEDURE — 0503F POSTPARTUM CARE VISIT: CPT | Performed by: OBSTETRICS & GYNECOLOGY

## 2019-01-02 NOTE — PROGRESS NOTES
"Chief Complaint   Patient presents with   • Postpartum Care     3 wks PP        SUBJECTIVE:   Callie Parks is a 28 y.o.  who presents s/p  Primary Low Transverse  Section on 12/15/18 course complicated by chronic hypertension and postpartum hemorrhage  Reports that she is doing well.  Taking her labetalol once daily due to feeling like her heart rate is too low to take it three times daily.  Still taking procardia at 3 AM.  Reports BP have mostly been normal and shows a log of 3-4 BP recordings per day that are mostly wnl  Bowel and bladder function have returned to normal  Bleeding scant  Mood changes none  breast feeding    Past Medical History:   Diagnosis Date   • Elevated liver enzymes    • Gestational hypertension    • Hypertension      Past Surgical History:   Procedure Laterality Date   • ADENOIDECTOMY     •  SECTION N/A 12/15/2018    Procedure:  SECTION PRIMARY;  Surgeon: Felicia Boswell MD;  Location: [facility] DELIVERY;  Service: Obstetrics/Gynecology   • WISDOM TOOTH EXTRACTION       Social History     Tobacco Use   • Smoking status: Never Smoker   • Smokeless tobacco: Never Used   Substance Use Topics   • Alcohol use: No   • Drug use: No     Family History   Problem Relation Age of Onset   • Breast cancer Neg Hx    • Ovarian cancer Neg Hx    • Uterine cancer Neg Hx    • Colon cancer Neg Hx        Patient Active Problem List   Diagnosis   • High-risk pregnancy   • Chronic hypertension affecting pregnancy   • Subclinical hypothyroidism   • Transaminitis   • Abnormal glucose affecting pregnancy   • Hypertension affecting pregnancy   • Preeclampsia   • Postpartum hemorrhage, delivered, current hospitalization   • ARTURO (acute kidney injury) (CMS/HCC)   • Status post  delivery        OBJECTIVE:   /97   Pulse 70   Temp 97.8 °F (36.6 °C) (Oral)   Ht 167.6 cm (65.98\")   Wt 88.9 kg (196 lb)   Breastfeeding? Yes   BMI 31.65 kg/m²    Physical Examination: "   General appearance - alert, well appearing, and in no distress  Chest - no tachypnea, retractions or cyanosis  Heart - normal rate and regular rhythm  Abdomen - soft, nontender, nondistended, no masses or organomegaly; Incision healing well, no drainage, no erythema, no hernia, dermabond starting to dissolve  Pelvic - deferred  Neurological - screening mental status exam normal  Musculoskeletal - no joint tenderness, deformity or swelling  Psychiatric - normal mood and affect    Lab Results   Component Value Date    WBC 12.55 (H) 2018    HGB 7.3 (L) 2018    HCT 22.1 (L) 2018    MCV 92.1 2018     2018        ASSESSMENT:   S/p Primary   BP check    PLAN:   Doing well postpartum  Baby girl doing well  Contraception undecided at this time  At this time, continue pelvic rest and lifting precautions.  BP is normal range.  Reviewed that she can continue labetalol daily and Procardia daily with recording serial BP  Has appt to see PCP at end of the month  Signs/symptoms of preeclampsia reviewed.

## 2019-01-28 ENCOUNTER — POSTPARTUM VISIT (OUTPATIENT)
Dept: OBSTETRICS AND GYNECOLOGY | Facility: CLINIC | Age: 29
End: 2019-01-28

## 2019-01-28 VITALS
BODY MASS INDEX: 31.5 KG/M2 | HEART RATE: 92 BPM | HEIGHT: 66 IN | SYSTOLIC BLOOD PRESSURE: 132 MMHG | DIASTOLIC BLOOD PRESSURE: 96 MMHG | WEIGHT: 196 LBS

## 2019-01-28 RX ORDER — ACETAMINOPHEN AND CODEINE PHOSPHATE 120; 12 MG/5ML; MG/5ML
1 SOLUTION ORAL DAILY
Qty: 28 TABLET | Refills: 12 | Status: SHIPPED | OUTPATIENT
Start: 2019-01-28 | End: 2019-03-27

## 2019-01-28 NOTE — PROGRESS NOTES
"Chief Complaint   Patient presents with   • Postpartum Care     6 WEEK PP        SUBJECTIVE:   Callie Parks is a 28 y.o.  who presents s/p  Primary Low Transverse  Section on 12/15/18 course complicated by chronic hypertension and postpartum hemorrhage  Reports that she is doing well.  Now taking procardia 60XL, followed up with PCP.  Reports BP have mostly been normal   Bowel and bladder function have returned to normal  Bleeding none  Mood changes none  breast feeding    Past Medical History:   Diagnosis Date   • Elevated liver enzymes    • Gestational hypertension    • Hypertension      Past Surgical History:   Procedure Laterality Date   • ADENOIDECTOMY     •  SECTION N/A 12/15/2018    Procedure:  SECTION PRIMARY;  Surgeon: Felicia Boswell MD;  Location: [facility] LABOR DELIVERY;  Service: Obstetrics/Gynecology   • WISDOM TOOTH EXTRACTION       Social History     Tobacco Use   • Smoking status: Never Smoker   • Smokeless tobacco: Never Used   Substance Use Topics   • Alcohol use: No   • Drug use: No     Family History   Problem Relation Age of Onset   • Breast cancer Neg Hx    • Ovarian cancer Neg Hx    • Uterine cancer Neg Hx    • Colon cancer Neg Hx        Patient Active Problem List   Diagnosis   • High-risk pregnancy   • Chronic hypertension affecting pregnancy   • Subclinical hypothyroidism   • Transaminitis   • Abnormal glucose affecting pregnancy   • Hypertension affecting pregnancy   • Preeclampsia   • Postpartum hemorrhage, delivered, current hospitalization   • ARTURO (acute kidney injury) (CMS/HCC)   • Status post  delivery        OBJECTIVE:   /96   Pulse 92   Ht 167.6 cm (65.98\")   Wt 88.9 kg (196 lb)   Breastfeeding? Yes   BMI 31.65 kg/m²    Physical Examination:   General appearance - alert, well appearing, and in no distress  Chest - no tachypnea, retractions or cyanosis  Heart - normal rate and regular rhythm  Abdomen - soft, nontender, nondistended, " no masses or organomegaly; Incision healing well, no drainage, no erythema, no hernia, dermabond starting to dissolve  Pelvic - normal external genitalia, normal vagina, no bleeding, cervix closed  Neurological - screening mental status exam normal  Musculoskeletal - no joint tenderness, deformity or swelling  Psychiatric - normal mood and affect    Lab Results   Component Value Date    WBC 12.55 (H) 2018    HGB 7.3 (L) 2018    HCT 22.1 (L) 2018    MCV 92.1 2018     2018        ASSESSMENT:   S/p Primary       PLAN:   Doing well postpartum  Baby girl doing well  Contraception Mirena IUD desired, will bridge with Micronor.    Activity precautions lifted  BP is mild range. Following with PCP, continue Procardia  Will need to check TSH and CBC at IUD insertion

## 2019-02-28 ENCOUNTER — PROCEDURE VISIT (OUTPATIENT)
Dept: OBSTETRICS AND GYNECOLOGY | Facility: CLINIC | Age: 29
End: 2019-02-28

## 2019-02-28 VITALS
BODY MASS INDEX: 31.66 KG/M2 | SYSTOLIC BLOOD PRESSURE: 121 MMHG | DIASTOLIC BLOOD PRESSURE: 88 MMHG | HEART RATE: 76 BPM | HEIGHT: 66 IN | WEIGHT: 197 LBS

## 2019-02-28 DIAGNOSIS — Z30.430 ENCOUNTER FOR INSERTION OF INTRAUTERINE CONTRACEPTIVE DEVICE (IUD): ICD-10-CM

## 2019-02-28 LAB
B-HCG UR QL: NEGATIVE
INTERNAL NEGATIVE CONTROL: NEGATIVE
INTERNAL POSITIVE CONTROL: POSITIVE
Lab: NORMAL

## 2019-02-28 PROCEDURE — 81025 URINE PREGNANCY TEST: CPT | Performed by: OBSTETRICS & GYNECOLOGY

## 2019-02-28 PROCEDURE — 58300 INSERT INTRAUTERINE DEVICE: CPT | Performed by: OBSTETRICS & GYNECOLOGY

## 2019-03-27 ENCOUNTER — OFFICE VISIT (OUTPATIENT)
Dept: OBSTETRICS AND GYNECOLOGY | Facility: CLINIC | Age: 29
End: 2019-03-27

## 2019-03-27 VITALS
BODY MASS INDEX: 32.14 KG/M2 | DIASTOLIC BLOOD PRESSURE: 74 MMHG | HEART RATE: 76 BPM | HEIGHT: 66 IN | WEIGHT: 200 LBS | SYSTOLIC BLOOD PRESSURE: 106 MMHG

## 2019-03-27 DIAGNOSIS — Z30.431 IUD CHECK UP: Primary | ICD-10-CM

## 2019-03-27 PROCEDURE — 99212 OFFICE O/P EST SF 10 MIN: CPT | Performed by: OBSTETRICS & GYNECOLOGY

## 2019-03-27 NOTE — PROGRESS NOTES
"String Check  Chief Complaint   Patient presents with   • Contraception     IUD string check.       Callie Parks is a 28 y.o.  who presented after placement of Mirena IUD on 19  Reports no issues    OBJECTIVE:  /74   Pulse 76   Ht 167.6 cm (65.98\")   Wt 90.7 kg (200 lb)   BMI 32.30 kg/m²    Gen: NAD  Pelvic: IUD strings seen and appear appropriate in length,     ASSESSMENT:   IUD in place    PLAN:   Follow up as needed     "

## 2020-07-10 ENCOUNTER — OFFICE VISIT (OUTPATIENT)
Dept: OBSTETRICS AND GYNECOLOGY | Facility: CLINIC | Age: 30
End: 2020-07-10

## 2020-07-10 VITALS
DIASTOLIC BLOOD PRESSURE: 92 MMHG | SYSTOLIC BLOOD PRESSURE: 132 MMHG | BODY MASS INDEX: 33.43 KG/M2 | HEART RATE: 83 BPM | WEIGHT: 213 LBS | HEIGHT: 67 IN

## 2020-07-10 DIAGNOSIS — Z01.419 WELL WOMAN EXAM WITH ROUTINE GYNECOLOGICAL EXAM: Primary | ICD-10-CM

## 2020-07-10 PROCEDURE — 58301 REMOVE INTRAUTERINE DEVICE: CPT | Performed by: OBSTETRICS & GYNECOLOGY

## 2020-07-10 PROCEDURE — 99395 PREV VISIT EST AGE 18-39: CPT | Performed by: OBSTETRICS & GYNECOLOGY

## 2020-07-10 NOTE — PROGRESS NOTES
Subjective     History of Present Illness  Chief Complaint   Patient presents with   • Annual Exam     pap:  YO Parks is a 29 y.o.  who presents for an annual examination     Pap history:  Last pap: Dec 2017  Prior abnormal paps: no  STDs  Sexually active: yes  History of STDs: no  Has had HPV vaccine: yes  Contraception:  Mirena IUD - placed 2019. Had no periods until she stopped breastfeeding approximately 2019 and then has random brown spotting. Not interested in trial of oral contraceptive pill or NSAID    She would like to have it removed today and use condoms    Screening for BRCA-   Is patient's family history significant for any risks?   no      Past Medical History:   Diagnosis Date   • Elevated liver enzymes    • Gestational hypertension    • Hypertension      Past Surgical History:   Procedure Laterality Date   • ADENOIDECTOMY     •  SECTION N/A 12/15/2018    Procedure:  SECTION PRIMARY;  Surgeon: Felicia Boswell MD;  Location: [facility] LABOR DELIVERY;  Service: Obstetrics/Gynecology   • WISDOM TOOTH EXTRACTION       Social History     Tobacco Use   • Smoking status: Never Smoker   • Smokeless tobacco: Never Used   Substance Use Topics   • Alcohol use: No   • Drug use: No     Family History   Problem Relation Age of Onset   • Deep vein thrombosis Mother 45   • Pulmonary embolism Mother 45   • Deep vein thrombosis Maternal Grandfather         30's   • Breast cancer Neg Hx    • Ovarian cancer Neg Hx    • Uterine cancer Neg Hx    • Colon cancer Neg Hx          Review of Systems   Constitutional: Negative for chills, fever and unexpected weight change.   HENT: Negative for congestion, nosebleeds and sore throat.    Eyes: Negative for visual disturbance.   Respiratory: Negative for cough, chest tightness and shortness of breath.    Cardiovascular: Negative for chest pain and palpitations.   Gastrointestinal: Negative for abdominal pain, constipation,  "diarrhea, nausea and vomiting.   Endocrine: Negative for polyuria.   Genitourinary: Negative for difficulty urinating, dyspareunia, dysuria, frequency, genital sores, hematuria, menstrual problem, pelvic pain, urgency, vaginal bleeding, vaginal discharge and vaginal pain.   Musculoskeletal: Negative for arthralgias and joint swelling.   Skin: Negative for color change and rash.   Neurological: Negative for dizziness, light-headedness and headaches.   Hematological: Does not bruise/bleed easily.   Psychiatric/Behavioral: Negative for dysphoric mood. The patient is not nervous/anxious.        Objective    /92   Pulse 83   Ht 170.2 cm (67\")   Wt 96.6 kg (213 lb)   Breastfeeding No   BMI 33.36 kg/m²    Physical Exam   Constitutional: She is oriented to person, place, and time. She appears well-developed and well-nourished. No distress.   HENT:   Head: Normocephalic and atraumatic.   Neck: No tracheal deviation present. No thyromegaly present.   Cardiovascular: Normal rate, regular rhythm and normal heart sounds. Exam reveals no gallop and no friction rub.   No murmur heard.  Pulmonary/Chest: Effort normal and breath sounds normal. No respiratory distress. She has no wheezes. She has no rales. She exhibits no tenderness. Right breast exhibits no inverted nipple, no mass, no nipple discharge, no skin change and no tenderness. Left breast exhibits no inverted nipple, no mass, no nipple discharge, no skin change and no tenderness.   Abdominal: Soft. She exhibits no distension and no mass. There is no tenderness.   Genitourinary: Uterus normal. No labial fusion. There is no rash, lesion or injury on the right labia. There is no rash, lesion or injury on the left labia. Uterus is not deviated, not enlarged, not fixed and not tender. Cervix exhibits no motion tenderness, no discharge and no friability. Right adnexum displays no mass, no tenderness and no fullness. Left adnexum displays no mass, no tenderness and no " fullness. No tenderness or bleeding in the vagina. No vaginal discharge found.   Genitourinary Comments: IUD string seen   Musculoskeletal: Normal range of motion. She exhibits no edema or deformity.   Lymphadenopathy:     She has no cervical adenopathy.   Neurological: She is alert and oriented to person, place, and time.   Skin: Skin is warm and dry. No rash noted. She is not diaphoretic.   Psychiatric: She has a normal mood and affect. Her behavior is normal. Judgment and thought content normal.         Assessment/Plan   Problems Addressed this Visit     None      Visit Diagnoses     Well woman exam with routine gynecological exam    -  Primary    Relevant Orders    Pap IG, Rfx HPV ASCU          Well woman counseling/screening:    Cervical cancer screening:    Reports no h/o cervical dysplasia   HPV vaccination recommended.   The patient is due for a pap today .    Screening guidelines discussed with patient  Breast cancer screening:    Discussed risks and benefits of earlier versus later screening   Clinical breast exam recommended for age 20-39 years every 1-3 years   Mammogram recommended starting age 40   Breast self awareness encouraged  STD Screening   Declined  Contraception :   Mirena IUD, desires removal today. removed today   Condoms, encouraged PNV if deciding to pursue fertility  Family history    does not demonstrate need for genetics referral   Healthy lifestyle counseling:   Patient was counseled on Body mass index is 33.36 kg/m².    Reviewed BP, cont Nifedipine

## 2020-07-10 NOTE — PROGRESS NOTES
Mirena IUD Removal Procedure Note    Indications: AUB  Pre Procedural Diagnosis: Desires removal of Mirena IUD for AUB    Post Procedural Diagnosis: Same  Counseling:  Patient was counseled on risks of IUD removal including but not limited to abnormal bleeding, infection, pregnancy, need for additional procedures and/or need for surgery.  All questions were answered to apparent satisfaction.     Procedure Details     Bimanual exam revealed Anteverted.  A speculum was inserted. The IUD strings were seen, grasped with a ring forcep and removed without difficulty.  The Mirena IUD was inspected and noted to be removed in its entirety.  Patient tolerated procedure well.    IUD Information:  See medication record.    Condition:  Stable    Complications:  None    Plan:    The patient was advised to call for any fever or for prolonged or severe pain or bleeding; she elected for condoms for contraception, start PNV when deciding to conceive. She was advised to use OTC analgesics as needed for mild to moderate pain.  Return to the clinic PRN for follow-up.

## 2020-07-14 LAB
CONV .: NORMAL
CYTOLOGIST CVX/VAG CYTO: NORMAL
CYTOLOGY CVX/VAG DOC CYTO: NORMAL
CYTOLOGY CVX/VAG DOC THIN PREP: NORMAL
DX ICD CODE: NORMAL
HIV 1 & 2 AB SER-IMP: NORMAL
OTHER STN SPEC: NORMAL
STAT OF ADQ CVX/VAG CYTO-IMP: NORMAL

## 2020-09-16 ENCOUNTER — INITIAL PRENATAL (OUTPATIENT)
Dept: OBSTETRICS AND GYNECOLOGY | Facility: CLINIC | Age: 30
End: 2020-09-16

## 2020-09-16 VITALS — WEIGHT: 213 LBS | DIASTOLIC BLOOD PRESSURE: 89 MMHG | SYSTOLIC BLOOD PRESSURE: 141 MMHG | BODY MASS INDEX: 33.36 KG/M2

## 2020-09-16 DIAGNOSIS — O09.299 HX OF PREECLAMPSIA, PRIOR PREGNANCY, CURRENTLY PREGNANT: ICD-10-CM

## 2020-09-16 DIAGNOSIS — Z34.80 SUPERVISION OF OTHER NORMAL PREGNANCY, ANTEPARTUM: Primary | ICD-10-CM

## 2020-09-16 LAB
B-HCG UR QL: POSITIVE
GLUCOSE UR STRIP-MCNC: NEGATIVE MG/DL
INTERNAL NEGATIVE CONTROL: NEGATIVE
INTERNAL POSITIVE CONTROL: POSITIVE
Lab: ABNORMAL
PROT UR STRIP-MCNC: NEGATIVE MG/DL

## 2020-09-16 PROCEDURE — 81025 URINE PREGNANCY TEST: CPT | Performed by: OBSTETRICS & GYNECOLOGY

## 2020-09-16 PROCEDURE — 0501F PRENATAL FLOW SHEET: CPT | Performed by: OBSTETRICS & GYNECOLOGY

## 2020-09-16 RX ORDER — PRENATAL VIT/IRON FUM/FOLIC AC 27MG-0.8MG
TABLET ORAL DAILY
COMMUNITY

## 2020-09-16 RX ORDER — NIFEDIPINE 60 MG/1
60 TABLET, EXTENDED RELEASE ORAL DAILY
COMMUNITY
End: 2020-11-12 | Stop reason: SDUPTHER

## 2020-09-16 NOTE — PROGRESS NOTES
Initial OB Visit    CC- Missed Menses    Callie Parks is being seen today for her first obstetrical visit.  She is a 29 y.o.    5w4d gestation.   FOB: Rigo Parks  This is a planned pregnancy.   Denies spotting, bleeding, cramping  # 1 - Date: 12/15/18, Sex: Female, Weight: 2740 g (6 lb 0.7 oz), GA: 37w2d, Delivery: , Low Transverse, Apgar1: 8, Apgar5: 9, Living: Living, Birth Comments: felecia 1     # 2 - Date: None, Sex: None, Weight: None, GA: None, Delivery: None, Apgar1: None, Apgar5: None, Living: None, Birth Comments: None      Current obstetric complaints: mild nausea, no vomiting   Prior obstetric issues, potential pregnancy concerns:  for arrest of dilation, h/o postpartum hemorrhage; h/o superimposed preeclampsia  Family history of genetic issues (includes FOB):  FOB's brother had gastroschisis/trisomy 13 and passed away 2-3 months after birth.    Prior infections concerning in pregnancy (Rash, fever since LMP): none  Varicella Hx - reports vaccination   Prior testing for Cystic Fibrosis Carrier or Sickle Cell Trait- none  History of abnormal pap smears: none  History of STIs: no  Prepregnancy BMI - Body mass index is 33.36 kg/m².    Past Medical History:   Diagnosis Date   • Elevated liver enzymes    • Gestational hypertension    • Hypertension        Past Surgical History:   Procedure Laterality Date   • ADENOIDECTOMY     •  SECTION N/A 12/15/2018    Procedure:  SECTION PRIMARY;  Surgeon: Felicia Boswell MD;  Location: [facility] LABOR DELIVERY;  Service: Obstetrics/Gynecology   • WISDOM TOOTH EXTRACTION         Current Outpatient Medications:   •  Cholecalciferol (VITAMIN D) 2000 units capsule, Take  by mouth Daily., Disp: , Rfl:   •  levothyroxine (SYNTHROID) 50 MCG tablet, Take 1 tablet by mouth Daily., Disp: 30 tablet, Rfl: 1  •  NIFEdipine CC (ADALAT CC) 30 MG 24 hr tablet, Take 1 tablet by mouth Daily., Disp: 30 tablet, Rfl: 1  •  Prenatal Vit-Fe  Fumarate-FA (prenatal vitamin 27-0.8) 27-0.8 MG tablet tablet, Take  by mouth Daily., Disp: , Rfl:   •  vitamin B-12 (CYANOCOBALAMIN) 1000 MCG tablet, Take 1,000 mcg by mouth Daily., Disp: , Rfl:   •  vitamin E 1000 UNIT capsule, Take 1,000 Units by mouth Daily., Disp: , Rfl:     No Known Allergies    Social History     Socioeconomic History   • Marital status:      Spouse name: Not on file   • Number of children: Not on file   • Years of education: Not on file   • Highest education level: Not on file   Tobacco Use   • Smoking status: Never Smoker   • Smokeless tobacco: Never Used   Substance and Sexual Activity   • Alcohol use: No   • Drug use: No   • Sexual activity: Yes     Partners: Male       Family History   Problem Relation Age of Onset   • Deep vein thrombosis Mother 45   • Pulmonary embolism Mother 45   • Deep vein thrombosis Maternal Grandfather         30's   • Breast cancer Neg Hx    • Ovarian cancer Neg Hx    • Uterine cancer Neg Hx    • Colon cancer Neg Hx        Review of systems     Constitutional: negative for chills, fevers and for fatigue  Eyes: negative  Ears, nose, mouth, throat, and face: negative for hearing loss and nasal congestion  Respiratory: negative for asthma and wheezing  Cardiovascular: negative for chest pain and dyspnea  Gastrointestinal: negative for dyspepsia, dysphagia abdominal pain  Genitourinary:negative for urinary incontinence  Integument/breast: negative for breast lump  Hematologic/lymphatic: negative for bleeding  Musculoskeletal:negative for aches  Neurological: negative for numbness/tingling  Behavioral/Psych: negative for anhedonia  Allergic/Immunologic: negative for rash, allergy    Objective    /89   Wt 96.6 kg (213 lb)   LMP 08/04/2020 (Exact Date)   BMI 33.36 kg/m²       General Appearance:    Alert, cooperative, in no acute distress, habitus obese   Head:    Normocephalic, without obvious abnormality, atraumatic   Eyes:            Lids and lashes  normal, conjunctivae and sclerae normal, no   icterus, no pallor, corneas clear   Ears:    Ears appear intact with no abnormalities noted       Neck:   No adenopathy, supple, trachea midline, no thyromegaly   Back:     No kyphosis present, no scoliosis present,                       Lungs:     Clear to auscultation,respirations regular, even and                   unlabored    Heart:    Regular rhythm and normal rate, normal S1 and S2, no            murmur, no gallop, no rub, no click   Breast Exam:    declined   Abdomen:     Normal bowel sounds, no masses, no organomegaly, soft        non-tender, non-distended, no guarding, no rebound                 tenderness   Genitalia:    Vulva - BUS-WNL, NEFG    Vagina - No discharge, No bleeding    Cervix - No Lesions, closed     Uterus - Consistent with 5 weeks    Adnexa - No masses, NT    Pelvimetry - clinically adequate, gynecoid pelvis     Extremities:   Moves all extremities well, no edema, no cyanosis, no              redness   Pulses:   Pulses palpable and equal bilaterally   Skin:   No bleeding, bruising or rash   Lymph nodes:   No palpable adenopathy   Neurologic:   Sensation intact, A&O times 3      Assessment  Pregnancy at 6w1d    Chronic hypertension, on medication   H/o preeclampsia in prior pregnancy  H/o CS  H/o postpartum hemorrhage  Hypothyroidism  Obesity     Plan    Initial labs drawn, GC/CHL screen done  Patient is on Prenatal vitamins  Problem list reviewed and updated.  Reviewed routine prenatal care with the office to include but not limited to:   Zika (travel restrictions/ok to use insect repellant), not to changing cat litter, food restrictions, avoidance of alcohol, tobacco and drugs and saunas/hot tubs, anticipated weight gain/nutrition requirements.  Reviewed nature of practice and hospital.  Reviewed recommended follow up, importance of compliance with care. We reviewed testing in pregnancy including HIV testing and urine drug screen.    Reviewed  aneuploidy screening and CF, SMA screening - declines at this time.  Thyroid disease- last check 8/22/2020 1.99 TSH, Synthroid 50mcg daily  Still had some elevated liver enzymes with labs 1 month ago with PCP.  Repeat labs at US visit We reviewed recommendation for 15lb weight gain in pregnancy given BMI.  Reviewed healthy, balanced diet.  On Procardia 60XL daily, discussed use of ASA for preeclampsia prevention.  Will review further at next visit. Baseline labs today.   Counseled on limitations of ultrasound in pregnancy.  All questions answered.           Felicia Boswell MD

## 2020-09-17 LAB
CREAT UR-MCNC: 56.2 MG/DL
PROT UR-MCNC: 4 MG/DL
PROT/CREAT UR: 71.2 MG/G CREA (ref 0–200)

## 2020-09-18 LAB
AMPHETAMINES UR QL SCN: NEGATIVE NG/ML
BACTERIA UR CULT: NORMAL
BACTERIA UR CULT: NORMAL
BARBITURATES UR QL SCN: NEGATIVE NG/ML
BENZODIAZ UR QL: NEGATIVE NG/ML
BZE UR QL: NEGATIVE NG/ML
C TRACH RRNA SPEC QL NAA+PROBE: NEGATIVE
CANNABINOIDS UR QL SCN: NEGATIVE NG/ML
METHADONE UR QL SCN: NEGATIVE NG/ML
N GONORRHOEA RRNA SPEC QL NAA+PROBE: NEGATIVE
OPIATES UR QL: NEGATIVE NG/ML
PCP UR QL: NEGATIVE NG/ML
PROPOXYPH UR QL SCN: NEGATIVE NG/ML
T VAGINALIS DNA SPEC QL NAA+PROBE: NEGATIVE

## 2020-10-02 ENCOUNTER — TELEPHONE (OUTPATIENT)
Dept: OBSTETRICS AND GYNECOLOGY | Facility: CLINIC | Age: 30
End: 2020-10-02

## 2020-10-02 LAB
ABO GROUP BLD: NORMAL
ALBUMIN SERPL-MCNC: 4.5 G/DL (ref 3.5–5.2)
ALBUMIN/GLOB SERPL: 2 G/DL
ALP SERPL-CCNC: 75 U/L (ref 39–117)
ALT SERPL-CCNC: 58 U/L (ref 1–33)
AST SERPL-CCNC: 37 U/L (ref 1–32)
BASOPHILS # BLD AUTO: 0 X10E3/UL (ref 0–0.2)
BASOPHILS NFR BLD AUTO: 0 %
BILIRUB SERPL-MCNC: 0.6 MG/DL (ref 0–1.2)
BLD GP AB SCN SERPL QL: NEGATIVE
BUN SERPL-MCNC: 11 MG/DL (ref 6–20)
BUN/CREAT SERPL: 17.7 (ref 7–25)
CALCIUM SERPL-MCNC: 9.4 MG/DL (ref 8.6–10.5)
CHLORIDE SERPL-SCNC: 104 MMOL/L (ref 98–107)
CO2 SERPL-SCNC: 22.3 MMOL/L (ref 22–29)
CREAT SERPL-MCNC: 0.62 MG/DL (ref 0.57–1)
EOSINOPHIL # BLD AUTO: 0 X10E3/UL (ref 0–0.4)
EOSINOPHIL NFR BLD AUTO: 0 %
ERYTHROCYTE [DISTWIDTH] IN BLOOD BY AUTOMATED COUNT: 13.3 % (ref 11.7–15.4)
GLOBULIN SER CALC-MCNC: 2.2 GM/DL
GLUCOSE SERPL-MCNC: 97 MG/DL (ref 65–99)
HBV SURFACE AG SERPL QL IA: NEGATIVE
HCT VFR BLD AUTO: 42.2 % (ref 34–46.6)
HCV AB S/CO SERPL IA: <0.1 S/CO RATIO (ref 0–0.9)
HGB BLD-MCNC: 13.9 G/DL (ref 11.1–15.9)
HIV 1+2 AB+HIV1 P24 AG SERPL QL IA: NON REACTIVE
IMM GRANULOCYTES # BLD AUTO: 0 X10E3/UL (ref 0–0.1)
IMM GRANULOCYTES NFR BLD AUTO: 0 %
LYMPHOCYTES # BLD AUTO: 2.9 X10E3/UL (ref 0.7–3.1)
LYMPHOCYTES NFR BLD AUTO: 29 %
MCH RBC QN AUTO: 30.8 PG (ref 26.6–33)
MCHC RBC AUTO-ENTMCNC: 32.9 G/DL (ref 31.5–35.7)
MCV RBC AUTO: 93 FL (ref 79–97)
MONOCYTES # BLD AUTO: 0.7 X10E3/UL (ref 0.1–0.9)
MONOCYTES NFR BLD AUTO: 7 %
NEUTROPHILS # BLD AUTO: 6.3 X10E3/UL (ref 1.4–7)
NEUTROPHILS NFR BLD AUTO: 64 %
PLATELET # BLD AUTO: 214 X10E3/UL (ref 150–450)
POTASSIUM SERPL-SCNC: 4.6 MMOL/L (ref 3.5–5.2)
PROT SERPL-MCNC: 6.7 G/DL (ref 6–8.5)
RBC # BLD AUTO: 4.52 X10E6/UL (ref 3.77–5.28)
RH BLD: POSITIVE
RPR SER QL: NON REACTIVE
RUBV IGG SERPL IA-ACNC: 1.33 INDEX
SODIUM SERPL-SCNC: 138 MMOL/L (ref 136–145)
TSH SERPL DL<=0.005 MIU/L-ACNC: 1.3 UIU/ML (ref 0.27–4.2)
WBC # BLD AUTO: 10.1 X10E3/UL (ref 3.4–10.8)

## 2020-10-02 NOTE — TELEPHONE ENCOUNTER
Attempted to call patient regarding results but no answer. Left voicemail for patient to contact office for results.    Marah Oscar MD  10/2/2020  13:26 EDT

## 2020-10-12 ENCOUNTER — ROUTINE PRENATAL (OUTPATIENT)
Dept: OBSTETRICS AND GYNECOLOGY | Facility: CLINIC | Age: 30
End: 2020-10-12

## 2020-10-12 VITALS
DIASTOLIC BLOOD PRESSURE: 100 MMHG | BODY MASS INDEX: 33.58 KG/M2 | SYSTOLIC BLOOD PRESSURE: 140 MMHG | WEIGHT: 214.4 LBS

## 2020-10-12 DIAGNOSIS — O10.919 CHRONIC HYPERTENSION AFFECTING PREGNANCY: ICD-10-CM

## 2020-10-12 DIAGNOSIS — Z34.80 SUPERVISION OF OTHER NORMAL PREGNANCY, ANTEPARTUM: Primary | ICD-10-CM

## 2020-10-12 LAB
GLUCOSE UR STRIP-MCNC: NEGATIVE MG/DL
PROT UR STRIP-MCNC: NEGATIVE MG/DL

## 2020-10-12 PROCEDURE — 90686 IIV4 VACC NO PRSV 0.5 ML IM: CPT | Performed by: OBSTETRICS & GYNECOLOGY

## 2020-10-12 PROCEDURE — 90471 IMMUNIZATION ADMIN: CPT | Performed by: OBSTETRICS & GYNECOLOGY

## 2020-10-12 PROCEDURE — 0502F SUBSEQUENT PRENATAL CARE: CPT | Performed by: OBSTETRICS & GYNECOLOGY

## 2020-10-12 NOTE — PROGRESS NOTES
Chief Complaint   Patient presents with   • Routine Prenatal Visit      Callie Parks is a 30 y.o.  at 9w6d   No bleeding, no cramping, no LOF  BP: 10/2/2020-today 111-137/65-88 at home.  No headache/vision changes/RUQ pain.    The AST/ALT from her initial OB labs were actually better than ones drawn a month before.    Denies headache/vision changes RUQ pain.  Declines genetic testing this pregnancy.   /100   Wt 97.3 kg (214 lb 6.4 oz)   LMP 2020 (Exact Date)   BMI 33.58 kg/m²    Gen: NAD, well appearing  Abd: gravid, nontender  See OB Flowsheet    ASSESSMENT:   1. IUP at 9w6d   2. Chronic hypertension  3. H/o   4. H/o superimposed preeclampsia in prior pregnancy  5. H/o postpartum hemorrhage  6. Hypothyroidism    PLAN:  Reviewed first trimester bleeding/pain precautions, and indications for sooner follow up.     Reviewed labs from last visit including transaminitis, consider repeat at next visit.  Declines genetic testing  Flu shot accepted, counseled on use of Tamiflu for prophylaxis if exposed or treatment of flu   24 hour urine protein ordered.  Start ASA 81mg at 12-14 weeks  Continue Procardia 60XL    Return in about 4 weeks (around 2020).  Orders Placed This Encounter   Procedures   • Fluarix/Fluzone/Afluria Quad>6 Months   • Creatinine, Urine, 24 Hour - Urine, Clean Catch   • Protein, Urine, 24 Hour - Urine, Clean Catch   • POC Urinalysis Dipstick     This is an external result entered through the Results Console.

## 2020-11-12 ENCOUNTER — ROUTINE PRENATAL (OUTPATIENT)
Dept: OBSTETRICS AND GYNECOLOGY | Facility: CLINIC | Age: 30
End: 2020-11-12

## 2020-11-12 ENCOUNTER — PATIENT MESSAGE (OUTPATIENT)
Dept: OBSTETRICS AND GYNECOLOGY | Facility: CLINIC | Age: 30
End: 2020-11-12

## 2020-11-12 VITALS — WEIGHT: 216 LBS | DIASTOLIC BLOOD PRESSURE: 84 MMHG | BODY MASS INDEX: 33.83 KG/M2 | SYSTOLIC BLOOD PRESSURE: 120 MMHG

## 2020-11-12 DIAGNOSIS — O10.919 CHRONIC HYPERTENSION AFFECTING PREGNANCY: Primary | ICD-10-CM

## 2020-11-12 DIAGNOSIS — Z3A.14 14 WEEKS GESTATION OF PREGNANCY: Primary | ICD-10-CM

## 2020-11-12 DIAGNOSIS — O16.2 HYPERTENSION AFFECTING PREGNANCY IN SECOND TRIMESTER: ICD-10-CM

## 2020-11-12 LAB
GLUCOSE UR STRIP-MCNC: NEGATIVE MG/DL
PROT UR STRIP-MCNC: NEGATIVE MG/DL

## 2020-11-12 PROCEDURE — 0502F SUBSEQUENT PRENATAL CARE: CPT | Performed by: NURSE PRACTITIONER

## 2020-11-12 RX ORDER — NIFEDIPINE 60 MG/1
60 TABLET, EXTENDED RELEASE ORAL DAILY
Qty: 90 TABLET | Refills: 3 | Status: SHIPPED | OUTPATIENT
Start: 2020-11-12

## 2020-11-12 NOTE — PROGRESS NOTES
OB follow up     Callie Parks is a 30 y.o.  14w2d being seen today for her obstetrical visit.  Patient reports no complaints. Fetal movement: occas flutters. She continues to take procardia and monitors BP at home. Dentist and orthodontist are requesting a note for pt to continue current orthodontic care and for routine dental cleaning.    Her prenatal care is complicated by (and status): CHTN, elevated liver enzymes     Review of Systems  Cramping/contractions : denies  Vaginal bleeding: denies  Fetal movement occas flutters    /84   Wt 98 kg (216 lb)   LMP 2020 (Exact Date)   BMI 33.83 kg/m²     FHT: 150 BPM           Assessment    1) Pregnancy at 14w2d   Anatomy scan at next visit  Provided with note for both orthodontist and dentist to complete routine exams/cleanings  Flu vaccine received at prior visit    2) CHTN  Reviewed BP log sent to [facility]  Procardia 60mg XL refilled  24 hr urine dropped off today    3) Hx   4) Hx superimposed preeclampsia in prior pregnancy  5) Hx PPH  6) Hypothyroidism    Plan    Reviewed this stage of pregnancy  Problem list updated   Follow up in 4 weeks    KERA Vuong  2020  11:58 EST

## 2020-11-13 LAB
CREAT 24H UR-MRATE: 1.19 G/24 HR (ref 0.7–1.6)
CREAT UR-MCNC: 50.5 MG/DL
PROT 24H UR-MRATE: 117.5 MG/24HOURS (ref 0–150)
PROT UR-MCNC: 5 MG/DL

## 2020-12-10 ENCOUNTER — ROUTINE PRENATAL (OUTPATIENT)
Dept: OBSTETRICS AND GYNECOLOGY | Facility: CLINIC | Age: 30
End: 2020-12-10

## 2020-12-10 VITALS — DIASTOLIC BLOOD PRESSURE: 96 MMHG | BODY MASS INDEX: 34.3 KG/M2 | WEIGHT: 219 LBS | SYSTOLIC BLOOD PRESSURE: 145 MMHG

## 2020-12-10 DIAGNOSIS — O10.919 CHRONIC HYPERTENSION AFFECTING PREGNANCY: Primary | ICD-10-CM

## 2020-12-10 LAB
GLUCOSE UR STRIP-MCNC: NEGATIVE MG/DL
PROT UR STRIP-MCNC: ABNORMAL MG/DL

## 2020-12-10 PROCEDURE — 0502F SUBSEQUENT PRENATAL CARE: CPT | Performed by: OBSTETRICS & GYNECOLOGY

## 2020-12-10 NOTE — PROGRESS NOTES
Chief Complaint   Patient presents with   • Routine Prenatal Visit      Callie Parks is a 30 y.o.  at 18w2d   No bleeding, no LOF; no cramping  Fetal movement noted  Reports BP at home have been in normal to mild range, does not have record with her today.  She does not need a refill at this time on Procardia.    /96   Wt 99.3 kg (219 lb)   LMP 2020 (Exact Date)   BMI 34.30 kg/m²    Gen: NAD, well appearing  Abd: nontender  See OB Flowsheet    ASSESSMENT:   1. IUP at 18w2d   2. Chronic hypertension  3. H/o CS  4. H/o PPH  PLAN:  Reviewed anatomy US and its limitations. Plan for growth US at next visit for chronic hypertension  Reviewed common symptoms of the third trimester.  Counseled on labor precautions and anticipated fetal movements.  Reviewed kick counts.  Patient is aware of the location of L&D.     Continue Procardia  Aware of signs/symptoms of preeclamspia  Return in about 4 weeks (around 2021) for growth US, OB visit.  Orders Placed This Encounter   Procedures   • US Ob Follow Up Transabdominal Approach each additional gestation     Standing Status:   Future     Standing Expiration Date:   12/10/2021     Order Specific Question:   Reason for Exam:     Answer:   chronic hypertension, repeat growth   • POC Urinalysis Dipstick     This is an external result entered through the Results Console.

## 2021-01-13 ENCOUNTER — ROUTINE PRENATAL (OUTPATIENT)
Dept: OBSTETRICS AND GYNECOLOGY | Facility: CLINIC | Age: 31
End: 2021-01-13

## 2021-01-13 VITALS — SYSTOLIC BLOOD PRESSURE: 115 MMHG | WEIGHT: 223 LBS | DIASTOLIC BLOOD PRESSURE: 79 MMHG | BODY MASS INDEX: 34.93 KG/M2

## 2021-01-13 DIAGNOSIS — O10.919 CHRONIC HYPERTENSION AFFECTING PREGNANCY: ICD-10-CM

## 2021-01-13 DIAGNOSIS — Z34.80 SUPERVISION OF OTHER NORMAL PREGNANCY, ANTEPARTUM: ICD-10-CM

## 2021-01-13 DIAGNOSIS — E03.8 OTHER SPECIFIED HYPOTHYROIDISM: Primary | ICD-10-CM

## 2021-01-13 LAB
GLUCOSE UR STRIP-MCNC: NEGATIVE MG/DL
PROT UR STRIP-MCNC: ABNORMAL MG/DL

## 2021-01-13 PROCEDURE — 0502F SUBSEQUENT PRENATAL CARE: CPT | Performed by: OBSTETRICS & GYNECOLOGY

## 2021-01-13 RX ORDER — LEVOTHYROXINE SODIUM 50 MCG
50 TABLET ORAL DAILY
Qty: 30 TABLET | Refills: 1 | Status: SHIPPED | OUTPATIENT
Start: 2021-01-13 | End: 2021-03-08

## 2021-01-14 NOTE — PROGRESS NOTES
Chief Complaint   Patient presents with   • Routine Prenatal Visit      Callie Parks is a 30 y.o.  at 23w1d   No bleeding, no LOF; no cramping  Fetal movement noted  Reports BP at home have been in normal range. She feels well.    /79   Wt 101 kg (223 lb)   LMP 2020 (Exact Date)   BMI 34.93 kg/m²    Gen: NAD, well appearing  Abd: nontender  See OB Flowsheet    ASSESSMENT:   1. IUP at 23w1d   2. Chronic hypertension  3. H/o CS  4. H/o PP hemorrhage  5. Hypothyroidism  PLAN:  Refill on Synthroid requested  Reviewed BP which is within normal limits, continue medication  Anatomy within normal limits and growth within normal limits today.  Reviewed common second trimester symptoms.  Reviewed precautions for signs of  labor, anticipated fetal movements.     Return in about 4 weeks (around 2/10/2021) for GCT, OB visit, Growth US.  Orders Placed This Encounter   Procedures   • US Ob Follow Up Transabdominal Approach each additional gestation     Standing Status:   Future     Standing Expiration Date:   2022     Order Specific Question:   Reason for Exam:     Answer:   chronic hypertension   • Gestational Screen 1 Hr ([facility])   • CBC (No Diff)   • TSH Rfx On Abnormal To Free T4   • POC Urinalysis Dipstick     This is an external result entered through the Results Console.

## 2021-02-15 ENCOUNTER — ROUTINE PRENATAL (OUTPATIENT)
Dept: OBSTETRICS AND GYNECOLOGY | Facility: CLINIC | Age: 31
End: 2021-02-15

## 2021-02-15 VITALS — WEIGHT: 229 LBS | SYSTOLIC BLOOD PRESSURE: 147 MMHG | DIASTOLIC BLOOD PRESSURE: 103 MMHG | BODY MASS INDEX: 35.87 KG/M2

## 2021-02-15 DIAGNOSIS — O35.CXX0 PLEURAL EFFUSION, FETAL, AFFECTING CARE OF MOTHER, ANTEPARTUM: ICD-10-CM

## 2021-02-15 DIAGNOSIS — O10.919 CHRONIC HYPERTENSION AFFECTING PREGNANCY: Primary | ICD-10-CM

## 2021-02-15 LAB
GLUCOSE UR STRIP-MCNC: NEGATIVE MG/DL
PROT UR STRIP-MCNC: NEGATIVE MG/DL

## 2021-02-15 PROCEDURE — 0502F SUBSEQUENT PRENATAL CARE: CPT | Performed by: OBSTETRICS & GYNECOLOGY

## 2021-02-15 NOTE — PROGRESS NOTES
Chief Complaint   Patient presents with   • Routine Prenatal Visit      Callie Parks is a 30 y.o.  at 27w6d   Denies any issues including no bleeding or LOF.   Fetal movement noted  Denies headache/vision changes/RUQ pain  Reports BP at home have been within normal limits but she is anxious due to new ultrasound finding    BP (!) 147/103   Wt 104 kg (229 lb)   LMP 2020 (Exact Date)   BMI 35.87 kg/m²    Gen: NAD, well appearing  Abd: nontender  See OB Flowsheet    ASSESSMENT:   1. IUP at 27w6d   2. Fetal pleural effusion  3. Chronic hypertension, on medication  4. Hypothyroidis  5. H/o CS  6. H/o Postpartum hemorrhage  PLAN:  Reviewed with Callie new finding of pleural effusion on ultrasound. Reviewed possible etiologies including infections, mass lesions, aneuploidy. She has not had aneuploidy testing this pregnancy.  There is no evidence of hydrops on today's ultrasound. Recommend MFM follow up, referral placed.  Patient will call if she has not heard follow up arrangement by the end of the week.  Continue current medications. Repeat CMP today. No evidence of preeclampsia although BP was higher than usual. She reports anxiety. Takes BP at home.    GCT, CBC today.   Reviewed common symptoms of the third trimester.  Counseled on labor precautions and anticipated fetal movements.  Reviewed kick counts.  Patient is aware of the location of L&D.     Return in about 2 weeks (around 3/1/2021).    Problem List Items Addressed This Visit        Gravid and     Chronic hypertension affecting pregnancy - Primary    Overview     On procardia  Baseline labs  On ASA  Repeat CMP and 24 hour urine in first trimester         Relevant Orders    Comprehensive Metabolic Panel      Other Visit Diagnoses     Pleural effusion, fetal, affecting care of mother, antepartum        Relevant Orders    [facility] Ref[facility]          Orders Placed This Encounter   Procedures   •  Comprehensive Metabolic Panel   • Ambulatory Referral to [facility])     Referral Priority:   Routine     Referral Type:   Diagnostic Imaging     Referral Reason:   Specialty Services Required     Number of Visits Requested:   1   • POC Urinalysis Dipstick     This is an external result entered through the Results Console.

## 2021-02-16 LAB
ALBUMIN SERPL-MCNC: 3.6 G/DL (ref 3.5–5.2)
ALBUMIN/GLOB SERPL: 1.5 G/DL
ALP SERPL-CCNC: 93 U/L (ref 39–117)
ALT SERPL-CCNC: 45 U/L (ref 1–33)
AST SERPL-CCNC: 28 U/L (ref 1–32)
BILIRUB SERPL-MCNC: 0.2 MG/DL (ref 0–1.2)
BUN SERPL-MCNC: 7 MG/DL (ref 6–20)
BUN/CREAT SERPL: 13 (ref 7–25)
CALCIUM SERPL-MCNC: 8.6 MG/DL (ref 8.6–10.5)
CHLORIDE SERPL-SCNC: 104 MMOL/L (ref 98–107)
CO2 SERPL-SCNC: 20.2 MMOL/L (ref 22–29)
CREAT SERPL-MCNC: 0.54 MG/DL (ref 0.57–1)
ERYTHROCYTE [DISTWIDTH] IN BLOOD BY AUTOMATED COUNT: 12.7 % (ref 12.3–15.4)
GLOBULIN SER CALC-MCNC: 2.4 GM/DL
GLUCOSE 1H P 50 G GLC PO SERPL-MCNC: 196 MG/DL (ref 65–139)
GLUCOSE SERPL-MCNC: 189 MG/DL (ref 65–99)
HCT VFR BLD AUTO: 38.3 % (ref 34–46.6)
HGB BLD-MCNC: 13 G/DL (ref 12–15.9)
MCH RBC QN AUTO: 31 PG (ref 26.6–33)
MCHC RBC AUTO-ENTMCNC: 33.9 G/DL (ref 31.5–35.7)
MCV RBC AUTO: 91.4 FL (ref 79–97)
PLATELET # BLD AUTO: 157 10*3/MM3 (ref 140–450)
POTASSIUM SERPL-SCNC: 3.5 MMOL/L (ref 3.5–5.2)
PROT SERPL-MCNC: 6 G/DL (ref 6–8.5)
RBC # BLD AUTO: 4.19 10*6/MM3 (ref 3.77–5.28)
SODIUM SERPL-SCNC: 136 MMOL/L (ref 136–145)
TSH SERPL DL<=0.005 MIU/L-ACNC: 2.8 UIU/ML (ref 0.27–4.2)
WBC # BLD AUTO: 11.16 10*3/MM3 (ref 3.4–10.8)

## 2021-02-17 ENCOUNTER — TELEPHONE (OUTPATIENT)
Dept: OBSTETRICS AND GYNECOLOGY | Facility: CLINIC | Age: 31
End: 2021-02-17

## 2021-02-17 RX ORDER — BLOOD-GLUCOSE METER
1 KIT MISCELLANEOUS AS NEEDED
Qty: 1 EACH | Refills: 0 | Status: SHIPPED | OUTPATIENT
Start: 2021-02-17 | End: 2021-03-15

## 2021-02-17 RX ORDER — LANCING DEVICE/LANCETS
KIT MISCELLANEOUS
Qty: 1 KIT | Refills: 0 | Status: SHIPPED | OUTPATIENT
Start: 2021-02-17 | End: 2021-03-15

## 2021-02-17 NOTE — TELEPHONE ENCOUNTER
Called patient and reviewed abnormal 1 hour (196). She was essentially NPO for this so I am worried the 3 hour may be too much glucose load. Will plan for 7 days of patterning. Supplies sent. She has MFM consult in AM

## 2021-02-17 NOTE — TELEPHONE ENCOUNTER
The patient needed to be see urgently at the [facility] office. We were not able to get her in our location but Dr Huitron, the covering [facility], is going to see her tomorrow morning 02/18/21 at [facility] office at 8:45 am.     He will go over everything with her including the scan and do a consult.     I called the patient and let her know the details including location and how to see if there are any weather delays at [facility] tomorrow. Patient is aware and will call back with any questions she has.     Faxed all the records over to [facility] to register the patient and have for tomorrow's consult.     Will notify  of the plans.

## 2021-02-18 ENCOUNTER — TELEPHONE (OUTPATIENT)
Dept: OBSTETRICS AND GYNECOLOGY | Facility: CLINIC | Age: 31
End: 2021-02-18

## 2021-02-18 ENCOUNTER — TRANSCRIBE ORDERS (OUTPATIENT)
Dept: ULTRASOUND IMAGING | Facility: HOSPITAL | Age: 31
End: 2021-02-18

## 2021-02-18 DIAGNOSIS — O35.CXX0 PLEURAL EFFUSION, FETAL, AFFECTING CARE OF MOTHER, ANTEPARTUM: Primary | ICD-10-CM

## 2021-02-18 NOTE — PROGRESS NOTES
Spoke with patient who will get cell free DNA drawn (order placed).  Also, has US appt with [facility] next week. Awaiting to hear from fetal echo.

## 2021-02-18 NOTE — TELEPHONE ENCOUNTER
I assume this pt does not need to see [facility], since she is following up at [facility]?    [facility] was closed on Tuesday, I called [facility] yesterday to schedul her and they told me they would call her to schedule.  Should I cancel that referral?

## 2021-02-18 NOTE — TELEPHONE ENCOUNTER
Dr. Boswell,  Dr. Huitron with [facility] would like to speak with you about this patient. His cell is [phone].    Thank you,   Ingrid

## 2021-02-19 ENCOUNTER — LAB (OUTPATIENT)
Dept: LAB | Facility: HOSPITAL | Age: 31
End: 2021-02-19

## 2021-02-19 PROCEDURE — 36415 COLL VENOUS BLD VENIPUNCTURE: CPT | Performed by: OBSTETRICS & GYNECOLOGY

## 2021-02-23 ENCOUNTER — TRANSCRIBE ORDERS (OUTPATIENT)
Dept: ULTRASOUND IMAGING | Facility: HOSPITAL | Age: 31
End: 2021-02-23

## 2021-02-23 ENCOUNTER — HOSPITAL ENCOUNTER (OUTPATIENT)
Dept: ULTRASOUND IMAGING | Facility: HOSPITAL | Age: 31
Discharge: HOME | End: 2021-02-23
Admitting: OBSTETRICS & GYNECOLOGY

## 2021-02-23 VITALS
HEIGHT: 67 IN | SYSTOLIC BLOOD PRESSURE: 148 MMHG | HEART RATE: 99 BPM | BODY MASS INDEX: 35.63 KG/M2 | DIASTOLIC BLOOD PRESSURE: 102 MMHG | TEMPERATURE: 97.7 F | WEIGHT: 227 LBS

## 2021-02-23 DIAGNOSIS — O35.CXX0 PLEURAL EFFUSION, FETAL, AFFECTING CARE OF MOTHER, ANTEPARTUM: ICD-10-CM

## 2021-02-23 DIAGNOSIS — O10.919 CHRONIC HYPERTENSION AFFECTING PREGNANCY: Primary | ICD-10-CM

## 2021-02-23 PROCEDURE — 76811 OB US DETAILED SNGL FETUS: CPT

## 2021-02-24 ENCOUNTER — TELEPHONE (OUTPATIENT)
Dept: OBSTETRICS AND GYNECOLOGY | Facility: CLINIC | Age: 31
End: 2021-02-24

## 2021-02-24 NOTE — TELEPHONE ENCOUNTER
I apologize, I have no idea what to do with this pt.    Originally she was seen on emergency basis w/[facility].  Saw [facility] on 2/23/21 (even though referral was cancelled), now scheduled for f/u w/[facility] on 3/9/21.  I assume she is staying with [facility], not [facility]?

## 2021-02-24 NOTE — TELEPHONE ENCOUNTER
She is seeing Dr. Huitron while Dr. Howe is out. We are awaiting her fetal echo to determine best route of delivery, so I think she is established and should not need any further referral coordination with [facility]. Thanks!

## 2021-03-01 ENCOUNTER — ROUTINE PRENATAL (OUTPATIENT)
Dept: OBSTETRICS AND GYNECOLOGY | Facility: CLINIC | Age: 31
End: 2021-03-01

## 2021-03-01 VITALS — SYSTOLIC BLOOD PRESSURE: 133 MMHG | DIASTOLIC BLOOD PRESSURE: 89 MMHG | WEIGHT: 227 LBS | BODY MASS INDEX: 35.55 KG/M2

## 2021-03-01 DIAGNOSIS — O10.919 CHRONIC HYPERTENSION AFFECTING PREGNANCY: ICD-10-CM

## 2021-03-01 DIAGNOSIS — O35.CXX0 PLEURAL EFFUSION, FETAL, AFFECTING CARE OF MOTHER, ANTEPARTUM: Primary | ICD-10-CM

## 2021-03-01 DIAGNOSIS — O09.299 HX OF PREECLAMPSIA, PRIOR PREGNANCY, CURRENTLY PREGNANT: ICD-10-CM

## 2021-03-01 LAB
CFDNA.FET/CFDNA.TOTAL SFR FETUS: NORMAL %
CITATION REF LAB TEST: NORMAL
FET 13+18+21+X+Y ANEUP PLAS.CFDNA: NEGATIVE
FET CHR 21 TS PLAS.CFDNA QL: NEGATIVE
FET MS X RISK WBC.DNA+CFDNA QL: NOT DETECTED
FET SEX PLAS.CFDNA DOSAGE CFDNA: NORMAL
FET TS 13 RISK PLAS.CFDNA QL: NEGATIVE
FET TS 18 RISK WBC.DNA+CFDNA QL: NEGATIVE
FET X + Y ANEUP RISK PLAS.CFDNA SEQ-IMP: NOT DETECTED
GA EST FROM CONCEPTION DATE: NORMAL D
GESTATIONAL AGE > 9:: YES
GLUCOSE UR STRIP-MCNC: NEGATIVE MG/DL
LAB DIRECTOR NAME PROVIDER: NORMAL
LAB DIRECTOR NAME PROVIDER: NORMAL
LABORATORY COMMENT REPORT: NORMAL
LIMITATIONS OF THE TEST: NORMAL
NEGATIVE PREDICTIVE VALUE: NORMAL
NOTE: NORMAL
PERFORMANCE CHARACTERISTICS: NORMAL
POSITIVE PREDICTIVE VALUE: NORMAL
PROT UR STRIP-MCNC: ABNORMAL MG/DL
REF LAB TEST METHOD: NORMAL
TEST PERFORMANCE INFO SPEC: NORMAL

## 2021-03-01 PROCEDURE — 0502F SUBSEQUENT PRENATAL CARE: CPT | Performed by: OBSTETRICS & GYNECOLOGY

## 2021-03-01 PROCEDURE — 90715 TDAP VACCINE 7 YRS/> IM: CPT | Performed by: OBSTETRICS & GYNECOLOGY

## 2021-03-01 PROCEDURE — 90471 IMMUNIZATION ADMIN: CPT | Performed by: OBSTETRICS & GYNECOLOGY

## 2021-03-01 NOTE — PROGRESS NOTES
Chief Complaint   Patient presents with   • Routine Prenatal Visit      Callie Parks is a 30 y.o.  at 29w6d   Denies any issues including no bleeding or LOF.   Fetal movement noted  Reports her BP have been well controlled at home.  Has fetal echo planned for 3/4    /89   Wt 103 kg (227 lb)   LMP 2020 (Exact Date)   BMI 35.55 kg/m²    Gen: NAD, well appearing  Abd: nontender  See OB Flowsheet    ASSESSMENT:   1. IUP at 29w6d   2.  Chronic hypertension on medication  3.  Fetal pleural effusion, fetal VSD  4. Failed 1 hour glucose challenge test, normal patterning  5. History of   6. History of postpartum hemorrhage  PLAN:  MT 21 not back, will check with [facility]  Reviewed MFM consult note from last week.  Appreciate input.  Plan for fetal echocardiogram on  and then will better delineate delivery planning.  Patient started on Procardia XL 60 mg in the morning, 30 mg at night.  Her blood pressure today is normal range for pregnancy.  Continue on Synthroid 50 mcg  Reviewed patterning with patient after her GCT was 196.  Her patterning has been within normal limits.  She can hold on patterning for now.  Reviewed common symptoms of the third trimester.  Counseled on labor precautions and anticipated fetal movements.  Reviewed kick counts.  Patient is aware of the location of L&D.     Tdap today, plans to hold on COVID vaccination to postpartum.   Aware of preeclampsia precautions, BP parameters, indications for sooner follow up. Has weekly US with MFM  Return in about 2 weeks (around 3/15/2021).    Problem List Items Addressed This Visit     None          Orders Placed This Encounter   Procedures   • Tdap Vaccine Greater Than or Equal To 6yo IM   • POC Urinalysis Dipstick     This is an external result entered through the Results Console.     ADDENDUM: [facility] was able to get faxed MT21 result which was negative for Trisomy 21, 18, 13 and SCA.  Patient was called and informed. Sex  chromosomes c/w male

## 2021-03-08 RX ORDER — LEVOTHYROXINE SODIUM 50 MCG
50 TABLET ORAL DAILY
Qty: 30 TABLET | Refills: 1 | Status: SHIPPED | OUTPATIENT
Start: 2021-03-08 | End: 2021-05-12

## 2021-03-09 ENCOUNTER — HOSPITAL ENCOUNTER (OUTPATIENT)
Dept: ULTRASOUND IMAGING | Facility: HOSPITAL | Age: 31
Discharge: HOME | End: 2021-03-09
Admitting: OBSTETRICS & GYNECOLOGY

## 2021-03-09 ENCOUNTER — TRANSCRIBE ORDERS (OUTPATIENT)
Dept: ULTRASOUND IMAGING | Facility: HOSPITAL | Age: 31
End: 2021-03-09

## 2021-03-09 VITALS
TEMPERATURE: 97.7 F | HEIGHT: 67 IN | OXYGEN SATURATION: 99 % | WEIGHT: 229 LBS | DIASTOLIC BLOOD PRESSURE: 90 MMHG | BODY MASS INDEX: 35.94 KG/M2 | SYSTOLIC BLOOD PRESSURE: 131 MMHG | HEART RATE: 71 BPM

## 2021-03-09 DIAGNOSIS — O10.919 CHRONIC HYPERTENSION AFFECTING PREGNANCY: ICD-10-CM

## 2021-03-09 DIAGNOSIS — O35.CXX0 PLEURAL EFFUSION, FETAL, AFFECTING CARE OF MOTHER, ANTEPARTUM: ICD-10-CM

## 2021-03-09 DIAGNOSIS — O10.919 CHRONIC HYPERTENSION AFFECTING PREGNANCY: Primary | ICD-10-CM

## 2021-03-09 PROCEDURE — 76819 FETAL BIOPHYS PROFIL W/O NST: CPT

## 2021-03-09 PROCEDURE — 76816 OB US FOLLOW-UP PER FETUS: CPT

## 2021-03-15 ENCOUNTER — ROUTINE PRENATAL (OUTPATIENT)
Dept: OBSTETRICS AND GYNECOLOGY | Facility: CLINIC | Age: 31
End: 2021-03-15

## 2021-03-15 VITALS — BODY MASS INDEX: 35.87 KG/M2 | DIASTOLIC BLOOD PRESSURE: 96 MMHG | WEIGHT: 229 LBS | SYSTOLIC BLOOD PRESSURE: 141 MMHG

## 2021-03-15 DIAGNOSIS — O35.CXX0 PLEURAL EFFUSION, FETAL, AFFECTING CARE OF MOTHER, ANTEPARTUM: ICD-10-CM

## 2021-03-15 DIAGNOSIS — Z34.80 SUPERVISION OF OTHER NORMAL PREGNANCY, ANTEPARTUM: ICD-10-CM

## 2021-03-15 DIAGNOSIS — O10.919 CHRONIC HYPERTENSION AFFECTING PREGNANCY: Primary | ICD-10-CM

## 2021-03-15 LAB
GLUCOSE UR STRIP-MCNC: NEGATIVE MG/DL
PROT UR STRIP-MCNC: ABNORMAL MG/DL

## 2021-03-15 PROCEDURE — 59426 ANTEPARTUM CARE ONLY: CPT | Performed by: OBSTETRICS & GYNECOLOGY

## 2021-03-15 NOTE — PROGRESS NOTES
Chief Complaint   Patient presents with   • Routine Prenatal Visit      Callie Parks is a 30 y.o.  at 31w6d   Denies any issues including no bleeding or LOF.   Fetal movement noted  She saw Peds Cardiology on Thursday and [facility] last week. Has appt with [facility] tomorrow. Denies headache/vision changes/right upper quadrant pain.    /96   Wt 104 kg (229 lb)   LMP 2020 (Exact Date)   BMI 35.87 kg/m²    Gen: NAD, well appearing  Abd: nontender  See OB Flowsheet    ASSESSMENT:   1. IUP at 31w6d   2.  Chronic hypertension on medication  3.  Fetal pleural effusion, fetal VSD  4. Failed 1 hour glucose challenge test, normal patterning  5. History of   6. History of postpartum hemorrhage  PLAN:  Delivery planning ongoing at this time.  Patient is aware of the possible need for  transfer if certain interventions were indicated that cannot be offered at [facility].  Plans for repeat .  Blood pressure monitoring to continue at home.  She reports that they have been within normal limits.  Denies need for refill of prescription.  Continue Procardia.  Aware of signs/symptoms of preeclampsia  Return in about 1 week (around 3/22/2021).    Problem List Items Addressed This Visit     None          Orders Placed This Encounter   Procedures   • POC Urinalysis Dipstick     This is an external result entered through the Results Console.

## 2021-03-16 ENCOUNTER — HOSPITAL ENCOUNTER (OUTPATIENT)
Dept: ULTRASOUND IMAGING | Facility: HOSPITAL | Age: 31
Discharge: HOME | End: 2021-03-16
Admitting: OBSTETRICS & GYNECOLOGY

## 2021-03-16 ENCOUNTER — CLINICAL SUPPORT (OUTPATIENT)
Dept: OBSTETRICS AND GYNECOLOGY | Facility: CLINIC | Age: 31
End: 2021-03-16

## 2021-03-16 ENCOUNTER — OFFICE VISIT (OUTPATIENT)
Dept: OBSTETRICS AND GYNECOLOGY | Facility: CLINIC | Age: 31
End: 2021-03-16

## 2021-03-16 ENCOUNTER — TELEPHONE (OUTPATIENT)
Dept: OBSTETRICS AND GYNECOLOGY | Facility: CLINIC | Age: 31
End: 2021-03-16

## 2021-03-16 VITALS
SYSTOLIC BLOOD PRESSURE: 129 MMHG | HEART RATE: 82 BPM | TEMPERATURE: 97.7 F | BODY MASS INDEX: 35.94 KG/M2 | HEIGHT: 67 IN | DIASTOLIC BLOOD PRESSURE: 83 MMHG | WEIGHT: 229 LBS

## 2021-03-16 VITALS
WEIGHT: 229 LBS | DIASTOLIC BLOOD PRESSURE: 93 MMHG | SYSTOLIC BLOOD PRESSURE: 133 MMHG | HEIGHT: 67 IN | BODY MASS INDEX: 35.94 KG/M2 | HEART RATE: 86 BPM

## 2021-03-16 DIAGNOSIS — O35.CXX0 PLEURAL EFFUSION, FETAL, AFFECTING CARE OF MOTHER, ANTEPARTUM: ICD-10-CM

## 2021-03-16 DIAGNOSIS — O09.299 HX OF PREECLAMPSIA, PRIOR PREGNANCY, CURRENTLY PREGNANT: ICD-10-CM

## 2021-03-16 DIAGNOSIS — Z98.891 HISTORY OF CESAREAN SECTION: ICD-10-CM

## 2021-03-16 DIAGNOSIS — O10.919 CHRONIC HYPERTENSION AFFECTING PREGNANCY: ICD-10-CM

## 2021-03-16 DIAGNOSIS — O35.CXX0 PLEURAL EFFUSION, FETAL, AFFECTING CARE OF MOTHER, ANTEPARTUM: Primary | ICD-10-CM

## 2021-03-16 DIAGNOSIS — O99.280 HYPOTHYROIDISM AFFECTING PREGNANCY, ANTEPARTUM: ICD-10-CM

## 2021-03-16 DIAGNOSIS — O36.23X0 HYDROPS FETALIS IN THIRD TRIMESTER, SINGLE OR UNSPECIFIED FETUS: ICD-10-CM

## 2021-03-16 DIAGNOSIS — O99.810 IMPAIRED GLUCOSE IN PREGNANCY, ANTEPARTUM: ICD-10-CM

## 2021-03-16 DIAGNOSIS — E03.9 HYPOTHYROIDISM AFFECTING PREGNANCY, ANTEPARTUM: ICD-10-CM

## 2021-03-16 DIAGNOSIS — R74.01 TRANSAMINITIS: ICD-10-CM

## 2021-03-16 PROCEDURE — 76821 MIDDLE CEREBRAL ARTERY ECHO: CPT | Performed by: OBSTETRICS & GYNECOLOGY

## 2021-03-16 PROCEDURE — 76819 FETAL BIOPHYS PROFIL W/O NST: CPT | Performed by: OBSTETRICS & GYNECOLOGY

## 2021-03-16 PROCEDURE — 76819 FETAL BIOPHYS PROFIL W/O NST: CPT

## 2021-03-16 PROCEDURE — 96372 THER/PROPH/DIAG INJ SC/IM: CPT | Performed by: OBSTETRICS & GYNECOLOGY

## 2021-03-16 PROCEDURE — 99214 OFFICE O/P EST MOD 30 MIN: CPT | Performed by: OBSTETRICS & GYNECOLOGY

## 2021-03-16 PROCEDURE — 76821 MIDDLE CEREBRAL ARTERY ECHO: CPT

## 2021-03-16 RX ORDER — BETAMETHASONE SODIUM PHOSPHATE AND BETAMETHASONE ACETATE 3; 3 MG/ML; MG/ML
12 INJECTION, SUSPENSION INTRA-ARTICULAR; INTRALESIONAL; INTRAMUSCULAR; SOFT TISSUE EVERY 24 HOURS
Status: COMPLETED | OUTPATIENT
Start: 2021-03-16 | End: 2021-03-16

## 2021-03-16 RX ADMIN — BETAMETHASONE SODIUM PHOSPHATE AND BETAMETHASONE ACETATE 12 MG: 3; 3 INJECTION, SUSPENSION INTRA-ARTICULAR; INTRALESIONAL; INTRAMUSCULAR; SOFT TISSUE at 16:50

## 2021-03-16 NOTE — PROGRESS NOTES
MATERNAL FETAL MEDICINE OUTPATIENT NOTE     Dear Dr Boswell     This is a followup visit.     Thank you for requesting my service to provide consultation for Callie Parks is a 30 y.o.   at  32 0/7 weeks gestation (Estimated Date of Delivery: 21).   She is being seen for: hypertension in pregnancy management, medication management, and fetal assessment.   Significant fetal pleural effusion of unknown origin.   Being followed by [facility].   Maintains daily fetal movement counts reports > 10 movements per hour.     Prior notes from the patient's obstetrician were reviewed.     Today, she denies headache, blurry vision, RUQ pain. No vaginal bleeding, no contractions.     Chief complaint    ICD-10-CM ICD-9-CM   1. Pleural effusion, fetal, affecting care of mother, antepartum  O35.8XX0 655.83   2. Risk for non-immune hydrops fetalis in third trimester, single or unspecified fetus  O36.23X0 653.73   3. Chronic hypertension affecting pregnancy  O10.919 642.00   4. Hx of preeclampsia, prior pregnancy, currently pregnant  O09.299 V23.49   5. Transaminitis  R74.01 790.4   6. Impaired glucose in pregnancy, antepartum -    O99.810 648.83   7. History of  section  Z98.891 V45.89   8. Hypothyroidism affecting pregnancy, antepartum  O99.280 648.13    E03.9 244.9       Past obstetric, gynecological, medical, surgical, family and social history reviewed; no changes made.     Review of systems  Constitutional:  No Weight Change, No Fever, No Chills, No Fatigue, No Malaise  ENT/Mouth:  No Hearing Changes, No Sinus Pain, No Hoarseness, No sore throat, No   Eyes:  No Eye Pain, No Vision Changes  Cardiovascular:  No Chest Pain, No SOB, No Palpitations  Respiratory:  No Cough, No Wheezing, No Dyspnea  Gastrointestinal:  No Nausea, No Vomiting, No Diarrhea, No Constipation, No Pain   Genitourinary:   No Dysuria, No Urinary Frequency, No Hematuria, No Flank Pain  Musculoskeletal:  No  "Arthralgias, No Myalgias,   Skin:  No Skin Lesions, No Pruritis,   Neuro:  No Weakness, No Numbness, No Loss of Consciousness, No Syncope  Psych:  No Memory Changes, No Violence/Abuse Hx., No Eating Concerns  Heme/Lymph:  No Bruising, No Bleeding  Endocrine:   No Temperature Intolerance    Vitals:    21 1027   BP: 129/83   BP Location: Right arm   Patient Position: Sitting   Cuff Size: Large Adult   Pulse: 82   Temp: 97.7 °F (36.5 °C)   TempSrc: Infrared   Weight: 104 kg (229 lb)   Height: 170.2 cm (67\")       PHYSICAL EXAM   GENERAL: Not in acute distress, gravid   NEURO: awake, alert and oriented to person, place, and time  ABDOMINAL: No fundal tenderness, no rebound or guarding   EXTREMITIES: Bilaterally lower extremities No edema, no tenderness  PELVIC: No obvious vaginal discharge, no bleeding    ULTRASOUND   Please view full ultrasound note on Imaging tab in ViewPoint.      ASSESSMENT   30 y.o.   at  32 0 /7 weeks gestation (Estimated Date of Delivery: 21), concerning fetal status and currently undergoing full workup and management planning     1. Single live intrauterine pregnancy   [ X ] stable - but guarded prognosis  [   ] improving [  ] worsening  See ultrasound report for full details.     2. Fetal risk of non-immune hydrops   Nonimmune hydrops (NIH) represents a fetal condition in which excess extracellular fluid is present in at least two sites.  These sites include the abdomen, the chest, the skin, the amniotic cavity (polyhydramnios), and the placenta (thickening > 6cm).  The overall incidence of NIH approximates 1 per 1500 pregnancies (1).    Currently there is a clear diagnosis of pleural effusion. Pediatric Cardiology notes reviewed and trivial pericardial effusion. However, this fetus remains at high risk of developing NIH.     The primary differential diagnosis for NIH involves exclusion of immune-mediated hydrops fetalis from red cell antibodies.  She is RH positive and " antibody negative.     We reviewed a long list of causes of NIH (specific to her history):    These etiologies include, but not limited to, cardiovascular malformations and arrhythmias, chromosomal aneuploidies, structural syndromes, inborn errors of metabolism, thoracic lesions, hereditary hemoglobinopathies, acquired anemias, in utero infections, genitourinary malformations, and fetal or placental tumors.  Additional differentials include: Gaurav and multiple pterygium syndrome. Idiopathic chylothorax, in which a local pleuromediastinal lymph vessel disturbance occurs as the possible pathogenic mechanism. Yellow nail syndrome, a dominantly inherited congenital lymphedema syndrome. Congenital pulmonary lymphangiectasia.     Less clear but associated infections: Coxsackie virus, trypanosomiasis, varicella, human herpesvirus 6 and 7, herpes simplex type, respiratory syncytial virus, congenital lymphocytic choriomeningitis virus, and leptospirosis.    Approximately 30% of cases have no identifiable etiology and are termed idiopathic.      The overall prognosis of NIH is poor.   mortality ranges from 50-98% (2-6).  Significant obstetrical morbidity can complicate the condition as well.  This morbidity can include  labor,  premature rupture of membranes, polyhydramnios with need for amnioreduction, maternal anemia, antepartum and postpartum hemorrhage, pre-eclampsia, Veto Syndrome (mirror syndrome with uncontrollable blood pressures), nonreassuring fetal heart rate patterns, and retained placenta.    Baseline evaluation includes a complete history, detailed ultrasound assessment, and maternal-fetal laboratory analyses.  The history should focus on ethnic background, family history of genetic disorders or congenital anomalies, consanguinity, maternal infections and exposures, and prior obstetrical history.  Ultrasound should focus on the severity of the condition, malformation assessment,  fetal echocardiogram, and Doppler studies if clinically indicated (i.e. MCA doppler to assess fetal anemia, umbilical artery doppler to assess placental resistance, etc.). She declined amniocentesis for direct workup of amniotic fluid.     Laboratory analyses should include (from maternal blood):  - maternal red cell type and antibody screen (which was done and normal)  - serial complete blood count  - hemoglobin electrophoresis   - Kleihauer-Betke test  - RPR (non reactive 10/2020)   - Serology (IgM and IgG) for: toxoplasmosis, rubella, cytomegalovirus, herpes simplex virus, Coxsackie virus, and parvovirus.   - Maternal genetic carrier screening     Patient is aware that a NIH cause can be found in nearly 60% of cases prenatally  and in 85% when  detection is included.    Pregnancies with NIH would reasonably be candidates for antepartum corticosteroid therapy if the gestational age is between 24-34 weeks      REFERENCES  1. Cristina. Obstet Gynecol 59:347, .  2. Sofia.  Obstet Gynecol 79:256, .  3. Kamilla. Am J Med Linh 34:366-90, .  4. Willie. Am J Obstet Gynecol 155:812, 1986.  5. Fabián. Obstet Gynecol 67:589, 1986.  6. Mohsen.  Am J Obstet Gynecol 148:563, .       3. Chronic Hypertension in pregnancy on antihypertensive medication; history of preeclampsia   [ X ] stable  [   ] improving [  ] worsening    Risks of chronic hypertension include superimposed preeclampsia, maternal cardiovascular accidents and cardiac dysfunction, iatrogenic  delivery, growth restriction and placenta abruption. Continue monitoring her blood pressure at primary OB visits.     Ambulatory blood pressure monitoring reviewed. For women on antihypertensive medications, goal blood pressure  = 120/60 to 150s/90s. Reviewed severe blood pressure precautions. If blood pressures are persistently less than 100/60s in a patient on antihypertensive medication, recommend titrating down blood pressure  medications.     24 hour urine protein baseline < 300mg       4. Transamnitis - recommend weekly CBC, CHEM   [   ] stable  [ X ] improving [  ] worsening      5. Impaired glucose tolerance with   Previously counseled by primary OB     6. Prior  delivery  [ X ] stable  [   ] improving [  ] worsening    7. Thyroid disorder - on synthroid   [ X ] stable  [   ] improving [  ] worsening    We discussed the risks of poorly controlled hypothyroidism such as fetal growth disorders and maternal intolerance of pregnancy. There are associations with long term neurodevelopmental abnormalities as well, although congenital hypothyroid developmental delay is restricted to fetuses with thyroid agenesis or ablation or areas of endemic iodine deficiency. We recommended primary OB to check every trimester thyroid function testing (TSH, free T4) with adjustment of medication accordingly.        MANAGEMENT PLAN    -Dr Boswell and I discussed patient management over the phone today, specifically where the best location for delivery. Given pleural effusions and likely need for comprehensive  care (including or not including Pediatric Cardiology) would recommend transfer of care to [facility]. Dr Boswell agrees with transfer and her office will arrange.     - Recommend OB to arrange steroids for fetal lung maturity acceleration this week   - Patient has followup next week with [facility]   -Serial growth ultrasounds every 3 weeks  -Twice weekly fetal  surveillance until delivery - BPP with MCA Doppler studies, and separate visit - Weekly NST (given fetal morbidities, would recommend NST on OB triage)   -Fetal movement instructions given continue daily until delivery; instructed to report to labor and delivery if cannot achieve more than 10 kicks in one hour or if she perceives a decrease in fetal movement  -Daily Low-dose aspirin initiated between 12 and 28 weeks of gestation reduces the  "occurrence of preeclampsia,  birth, and intrauterine growth restriction in women at increased risk for preeclampsia ( http://www.uspreventiveservicestaskforce.org/). Stop at around 36 weeks gestation or three weeks before delivery (whichever comes first).  -Recommend weekly CBC, CHEM   -Evaluate on L+D for BP > 160/100 or if patient complains of headache / blurry vision / RUQ or epigastric pain         DELIVERY TIMING  Per FM: \"Based on expert opinion, development or worsening of NIH in a pregnancy that has reached about 34 weeks would seem a reasonable indication for delivery.  In the absence of clinical deterioration or other indication for earlier intervention, delivery by 37-38 weeks should be considered.\"  Would recommend delivery no later than 37 0/7 weeks gestation given increased risk of intrauterine fetal death         This note has been routed to the referring obstetricians/medical provider.   At the end of the consultation, all patient questions were answered, concerns addressed, and comprehensive management plan and followup given to patient.     Thank you for your clinical consult.     DANIEL MYERS MD MPH FACOG  MATERNAL FETAL MEDICINE       "

## 2021-03-17 ENCOUNTER — HOSPITAL ENCOUNTER (OUTPATIENT)
Facility: HOSPITAL | Age: 31
Discharge: HOME | End: 2021-03-17
Attending: OBSTETRICS & GYNECOLOGY | Admitting: OBSTETRICS & GYNECOLOGY

## 2021-03-17 VITALS
SYSTOLIC BLOOD PRESSURE: 141 MMHG | DIASTOLIC BLOOD PRESSURE: 90 MMHG | RESPIRATION RATE: 18 BRPM | BODY MASS INDEX: 35.89 KG/M2 | WEIGHT: 229.2 LBS | HEART RATE: 98 BPM | TEMPERATURE: 98.4 F

## 2021-03-17 PROBLEM — Z34.90 PREGNANCY: Status: ACTIVE | Noted: 2021-03-17

## 2021-03-17 PROCEDURE — 96372 THER/PROPH/DIAG INJ SC/IM: CPT

## 2021-03-17 PROCEDURE — G0378 HOSPITAL OBSERVATION PER HR: HCPCS

## 2021-03-17 PROCEDURE — 25010000002 BETAMETHASONE ACET & SOD PHOS PER 4 MG: Performed by: OBSTETRICS & GYNECOLOGY

## 2021-03-17 RX ORDER — BETAMETHASONE SODIUM PHOSPHATE AND BETAMETHASONE ACETATE 3; 3 MG/ML; MG/ML
12 INJECTION, SUSPENSION INTRA-ARTICULAR; INTRALESIONAL; INTRAMUSCULAR; SOFT TISSUE ONCE
Status: COMPLETED | OUTPATIENT
Start: 2021-03-17 | End: 2021-03-17

## 2021-03-17 RX ADMIN — BETAMETHASONE SODIUM PHOSPHATE AND BETAMETHASONE ACETATE 12 MG: 3; 3 INJECTION, SUSPENSION INTRA-ARTICULAR; INTRALESIONAL; INTRAMUSCULAR at 19:05

## 2021-03-18 ENCOUNTER — TELEPHONE (OUTPATIENT)
Dept: OBSTETRICS AND GYNECOLOGY | Facility: CLINIC | Age: 31
End: 2021-03-18

## 2021-03-18 NOTE — TELEPHONE ENCOUNTER
I faxed her referral on Tuesday, called [facility] yesterday morning and was told they would reach out her yesterday to schedule.  Called them back this morning and she is still not scheduled.  Was informed, again, that they will call her this morning to schedule the appt.  I will call them back at lunchtime to confirm.  If they do not schedule her for tomorrow I will call the pt and add her for NST in our office.

## 2021-03-18 NOTE — TELEPHONE ENCOUNTER
----- Message from Felicia Boswell MD sent at 3/17/2021  8:46 PM EDT -----  Regarding: [facility]  Hey-  Do you happen to know the status of her referral? I’m just checking bc if you got any push back I can reach out to my friend directly. She needs to be seen on Friday for an NST if she can’t be seen at [facility]. Thanks so much!  Felicia

## 2021-03-18 NOTE — NURSING NOTE
Pt here for 2nd BMZ injection enroute to work per Dr. Boswell's orders.    Prior RN planned to place pt on the monitor.  Pt stated that she had to be at work in 10 minutes (here at [facility]) and that Dr. Boswell said that she just had to receive her injection.  Pt states that she didn't have time for monitoring.  Dr. Boswell called and confirmed ok not to do NST.  Spot check of FHT done and BP taken. Pt on Procardia for BP.  Pt to follow up tomorrow with MD.  IM injection given. Pt d/c'd to work..

## 2021-03-24 ENCOUNTER — TELEPHONE (OUTPATIENT)
Dept: OBSTETRICS AND GYNECOLOGY | Facility: CLINIC | Age: 31
End: 2021-03-24

## 2021-03-24 NOTE — TELEPHONE ENCOUNTER
Hello,  Patient's  is requesting a prescription for a breast pump. He says that the request has been sent twice to us this week. Please send if you can. The fax number is [phone].  Thanks,  Gisella

## 2021-03-25 RX ORDER — BETAMETHASONE SODIUM PHOSPHATE AND BETAMETHASONE ACETATE 3; 3 MG/ML; MG/ML
12 INJECTION, SUSPENSION INTRA-ARTICULAR; INTRALESIONAL; INTRAMUSCULAR; SOFT TISSUE EVERY 24 HOURS
Status: SHIPPED | OUTPATIENT
Start: 2021-03-16 | End: 2021-03-17

## 2021-03-25 NOTE — PROGRESS NOTES
(office supplied)  Patient here for betamethasone  injection. Patient did not have a reaction to the injection or medication. Injection on the right ventragluteal.   NDC 4154-7552-00  Exp: 08/2021  Lot: [id number]

## 2021-03-26 NOTE — TELEPHONE ENCOUNTER
03/26/21  Called patient to let her know that a prescription has been faxed to [facility] expressed they will look into what her insurance will cover and contact her. Also another prescription was faxed to the number her  provided [phone]. She may get a call from both different places and she can decide which breast she wants and they will let her know which breat pump is covered. No answer, left msg to return the call.     *pt returned the call and is aware that Rx for a breast pump was faxed to two different locations and to expect a all from both location and it will be up to her who she choose to get it from and what breast bump she decided to get. Pt verbally expressed understanding.

## 2021-04-30 ENCOUNTER — DOCUMENTATION (OUTPATIENT)
Dept: OBSTETRICS AND GYNECOLOGY | Facility: CLINIC | Age: 31
End: 2021-04-30

## 2021-05-01 NOTE — PROGRESS NOTES
Patient reached out with complaints of right-sided back pain since last night, relieved with a heating pad, Tylenol and Motrin. Hr normal.  Denies fever, chills. Reviewed difficulty in diagnosing without further evaluation. Would have low threshold to go for evaluation, jeff with chest pain, fever or worsening symptoms that she go for immediate evaluation.  For now can continue heating pad and try topical muscle relaxant such as icy hot  but if no improvement go to ER. Patient expressed agreement understanding.

## 2021-05-12 RX ORDER — LEVOTHYROXINE SODIUM 50 MCG
50 TABLET ORAL DAILY
Qty: 30 TABLET | Refills: 1 | Status: SHIPPED | OUTPATIENT
Start: 2021-05-12

## 2021-05-20 ENCOUNTER — PATIENT MESSAGE (OUTPATIENT)
Dept: OBSTETRICS AND GYNECOLOGY | Facility: CLINIC | Age: 31
End: 2021-05-20

## 2021-05-21 RX ORDER — VALACYCLOVIR HYDROCHLORIDE 1 G/1
TABLET, FILM COATED ORAL
Qty: 20 TABLET | Refills: 1 | Status: SHIPPED | OUTPATIENT
Start: 2021-05-21

## 2021-05-26 ENCOUNTER — POSTPARTUM VISIT (OUTPATIENT)
Dept: OBSTETRICS AND GYNECOLOGY | Facility: CLINIC | Age: 31
End: 2021-05-26

## 2021-05-26 VITALS
DIASTOLIC BLOOD PRESSURE: 81 MMHG | HEIGHT: 67 IN | WEIGHT: 205 LBS | BODY MASS INDEX: 32.18 KG/M2 | SYSTOLIC BLOOD PRESSURE: 115 MMHG | HEART RATE: 78 BPM

## 2021-05-26 PROCEDURE — 99213 OFFICE O/P EST LOW 20 MIN: CPT | Performed by: OBSTETRICS & GYNECOLOGY

## 2021-05-26 NOTE — PROGRESS NOTES
Chief Complaint   Patient presents with   • Postpartum Care        SUBJECTIVE:   Callie Parks is a 30 y.o.  who presents s/p  Repeat Low Transverse  Section on 21.  Reports her blood pressures have been good. She is doing well and so is her baby.  Bowel and bladder function have returned to normal  Mood changes EPDS 1  breatsfeeding    OB History    Para Term  AB Living   2 2 2 0 0 2   SAB TAB Ectopic Molar Multiple Live Births   0 0 0 0 0 2      # Outcome Date GA Lbr Supa/2nd Weight Sex Delivery Anes PTL Lv   2 Term 21 36w0d  2892 g (6 lb 6 oz) M CS-LTranv   JANELLE   1 Term 12/15/18 37w2d  2740 g (6 lb 0.7 oz) F CS-LTranv EPI N JANELLE      Birth Comments: panda 1       Complications: Failure to Progress in First Stage          Past Medical History:   Diagnosis Date   • Elevated liver enzymes    • Gestational hypertension    • Hypertension      Past Surgical History:   Procedure Laterality Date   • ADENOIDECTOMY     •  SECTION N/A 12/15/2018    Procedure:  SECTION PRIMARY;  Surgeon: Felicia Boswell MD;  Location: [facility] LABOR DELIVERY;  Service: Obstetrics/Gynecology   • WISDOM TOOTH EXTRACTION       Social History     Tobacco Use   • Smoking status: Never Smoker   • Smokeless tobacco: Never Used   Substance Use Topics   • Alcohol use: No   • Drug use: No     Family History   Problem Relation Age of Onset   • Deep vein thrombosis Mother 45   • Pulmonary embolism Mother 45   • Deep vein thrombosis Maternal Grandfather         30's   • Breast cancer Neg Hx    • Ovarian cancer Neg Hx    • Uterine cancer Neg Hx    • Colon cancer Neg Hx        Patient Active Problem List   Diagnosis   • High-risk pregnancy   • Chronic hypertension affecting pregnancy   • Subclinical hypothyroidism   • Transaminitis   • Abnormal glucose affecting pregnancy   • Hypertension affecting pregnancy   • Preeclampsia   • Postpartum hemorrhage, delivered, current hospitalization   • ARTURO (acute  "kidney injury) (CMS/[facility])   • Status post  delivery   • Pleural effusion, fetal, affecting care of mother, antepartum   • Hx of preeclampsia, prior pregnancy, currently pregnant   • Pregnancy        OBJECTIVE:   /81   Pulse 78   Ht 170.2 cm (67.01\")   Wt 93 kg (205 lb)   Breastfeeding Yes   BMI 32.10 kg/m²    Physical Examination:   General appearance - alert, well appearing, and in no distress  Breasts - deferred  Chest - no tachypnea, retractions or cyanosis  Heart - normal rate and regular rhythm  Abdomen - soft, nontender, nondistended, no masses or organomegaly; Incision well healed  Pelvic - normal external genitalia, vulva, vagina, cervix, uterus and adnexa, no bleeding  Neurological - screening mental status exam normal  Musculoskeletal - no joint tenderness, deformity or swelling  Psychiatric - normal mood and affect    Lab Results   Component Value Date    WBC 12.11 (H) 2021    HGB 12.9 2021    HCT 38.9 2021    MCV 90.9 2021     2021        ASSESSMENT:   S/p CS    PLAN:   Doing well postpartum  Baby doing well  Contraception: desires Mirena IUD  At this time, may resume normal activity including intercourse and lifting.  Reviewed normal precautions.  Reviewed last pap smear 2020 NIL  Return in about 2 weeks (around 2021) for IUD insertion under US guidance.    Recommended follow up for annual exam or sooner with problems      "

## 2021-06-09 ENCOUNTER — PROCEDURE VISIT (OUTPATIENT)
Dept: OBSTETRICS AND GYNECOLOGY | Facility: CLINIC | Age: 31
End: 2021-06-09

## 2021-06-09 VITALS
SYSTOLIC BLOOD PRESSURE: 135 MMHG | HEART RATE: 82 BPM | DIASTOLIC BLOOD PRESSURE: 95 MMHG | BODY MASS INDEX: 32.18 KG/M2 | HEIGHT: 67 IN | WEIGHT: 205 LBS

## 2021-06-09 DIAGNOSIS — Z30.430 ENCOUNTER FOR INSERTION OF MIRENA IUD: Primary | ICD-10-CM

## 2021-06-09 LAB
B-HCG UR QL: NEGATIVE
INTERNAL NEGATIVE CONTROL: NORMAL
INTERNAL POSITIVE CONTROL: NORMAL
Lab: NORMAL

## 2021-06-09 PROCEDURE — 58300 INSERT INTRAUTERINE DEVICE: CPT | Performed by: OBSTETRICS & GYNECOLOGY

## 2021-06-09 PROCEDURE — 81025 URINE PREGNANCY TEST: CPT | Performed by: OBSTETRICS & GYNECOLOGY

## 2021-06-09 NOTE — PROGRESS NOTES
Mirena IUD Insertion Procedure Note    Indications: Desires LARC with Mirena IUD    Pre Procedural Diagnosis: Desires LARC with Mirena IUD    Post Procedural Diagnosis: Same    Counseling:  Patient was counseled on risks of IUD insertion including but not limited to abnormal bleeding, infection, uterine perforation, expulsion, failure of contraception resulting in pregnancy, need for additional procedures and/or need for surgery.  All questions were answered to apparent satisfaction.  She was counseled about the duration of treatment, recommended removal in 5 years.  She was advised to perform monthly string checks and to see evaluation with unexpected changes in bleeding patterns and/or unable to feel the strings.      Procedure Details   Urine pregnancy test was done, and result was negative.      Bimanual exam revealed Anteverted. Cervix cleansed with Hibiclens. Tenaculum applied to the anterior lip.  Uterus sounded to 8 cm. IUD inserted without difficulty. String visible and trimmed. Patient tolerated procedure well.    IUD Information:  See medication record.    Condition:  Stable    Complications:  None    Plan:    The patient was advised to call for any fever or for prolonged or severe pain or bleeding; she was also advised to use a back up method of contraception for 7 days or abstain from intercourse. She was advised to use OTC analgesics as needed for mild to moderate pain.  Return to the clinic in 4 weeks for follow-up.  TVUS performed upon completion of procedure and IUD appears appropriately positioned in uterine cavity.

## 2021-07-22 ENCOUNTER — OFFICE VISIT (OUTPATIENT)
Dept: OBSTETRICS AND GYNECOLOGY | Facility: CLINIC | Age: 31
End: 2021-07-22

## 2021-07-22 VITALS
SYSTOLIC BLOOD PRESSURE: 152 MMHG | BODY MASS INDEX: 32.65 KG/M2 | HEIGHT: 67 IN | DIASTOLIC BLOOD PRESSURE: 103 MMHG | WEIGHT: 208 LBS

## 2021-07-22 DIAGNOSIS — I10 HYPERTENSION, UNSPECIFIED TYPE: ICD-10-CM

## 2021-07-22 DIAGNOSIS — Z30.431 IUD CHECK UP: Primary | ICD-10-CM

## 2021-07-22 PROCEDURE — 99212 OFFICE O/P EST SF 10 MIN: CPT | Performed by: NURSE PRACTITIONER

## 2021-07-22 NOTE — PROGRESS NOTES
Chief Complaint   Patient presents with   • Follow-up     IUD string check        SUBJECTIVE:     Callie Parks is a 30 y.o.  who presents for Mirena IUD string check.  She is breast feeding. She reports ongoing light brown spotting since placement on 21. She is otherwise having no issues. BP is elevated today, she states she just recently took this mornings dose, she worked last night, and was anxious about traffic in the drive in. She has not yet followed up with PCP, but is planning to.     Past Medical History:   Diagnosis Date   • Elevated liver enzymes    • Gestational hypertension    • Hypertension       Past Surgical History:   Procedure Laterality Date   • ADENOIDECTOMY     •  SECTION N/A 12/15/2018    Procedure:  SECTION PRIMARY;  Surgeon: Felicia Boswell MD;  Location: [facility] LABOR DELIVERY;  Service: Obstetrics/Gynecology   • WISDOM TOOTH EXTRACTION        Social History     Tobacco Use   • Smoking status: Never Smoker   • Smokeless tobacco: Never Used   Substance Use Topics   • Alcohol use: No   • Drug use: No     OB History    Para Term  AB Living   2 2 2 0 0 2   SAB TAB Ectopic Molar Multiple Live Births   0 0 0 0 0 2      # Outcome Date GA Lbr Supa/2nd Weight Sex Delivery Anes PTL Lv   2 Term 21 36w0d  2892 g (6 lb 6 oz) M CS-LTranv   JANELLE   1 Term 12/15/18 37w2d  2740 g (6 lb 0.7 oz) F CS-LTranv EPI N JANELLE      Birth Comments: panda 1       Complications: Failure to Progress in First Stage        Review of Systems   Constitutional: Negative for chills, fatigue and fever.   Respiratory: Negative for chest tightness.    Cardiovascular: Negative for chest pain and palpitations.   Gastrointestinal: Negative for abdominal distention and abdominal pain.   Genitourinary: Positive for vaginal bleeding (spotting). Negative for dyspareunia, pelvic pain, vaginal discharge and vaginal pain.   Musculoskeletal: Negative for gait problem.   Neurological: Negative for  "dizziness and headaches.       OBJECTIVE:   Vitals:    07/22/21 1021   BP: (!) 152/103   Weight: 94.3 kg (208 lb)   Height: 170.2 cm (67\")        Physical Exam  Vitals and nursing note reviewed.   Constitutional:       Appearance: Normal appearance.   HENT:      Head: Normocephalic and atraumatic.   Eyes:      Pupils: Pupils are equal, round, and reactive to light.   Cardiovascular:      Rate and Rhythm: Normal rate.      Pulses: Normal pulses.   Pulmonary:      Effort: Pulmonary effort is normal.   Abdominal:      General: There is no distension.      Palpations: Abdomen is soft. There is no mass.      Tenderness: There is no abdominal tenderness. There is no guarding.      Hernia: No hernia is present. There is no hernia in the left inguinal area or right inguinal area.   Genitourinary:     General: Normal vulva.      Exam position: Lithotomy position.      Pubic Area: No rash or pubic lice.       Labia:         Right: No rash, tenderness, lesion or injury.         Left: No rash, tenderness, lesion or injury.       Urethra: No prolapse, urethral pain, urethral swelling or urethral lesion.      Vagina: Foreign body (IUD strings present) present. No signs of injury. Vaginal discharge (brown, scant) present. No erythema, tenderness, bleeding, lesions or prolapsed vaginal walls.      Cervix: Normal.      Uterus: Normal.       Adnexa: Right adnexa normal and left adnexa normal.   Musculoskeletal:         General: Normal range of motion.      Cervical back: Normal range of motion.   Lymphadenopathy:      Lower Body: No right inguinal adenopathy. No left inguinal adenopathy.   Skin:     General: Skin is warm and dry.   Neurological:      General: No focal deficit present.      Mental Status: She is alert and oriented to person, place, and time.      Cranial Nerves: No cranial nerve deficit.   Psychiatric:         Mood and Affect: Mood normal.         Behavior: Behavior normal.         Thought Content: Thought content " normal.         Judgment: Judgment normal.         ASSESSMENT:   1) IUD check  2) Elevated BP    PLAN:   IUD strings present  Discussed expectations with spotting. 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

## 2021-11-05 RX ORDER — NIFEDIPINE 60 MG/1
60 TABLET, EXTENDED RELEASE ORAL DAILY
Qty: 90 TABLET | Refills: 3 | OUTPATIENT
Start: 2021-11-05

## 2021-11-05 NOTE — TELEPHONE ENCOUNTER
Called pt. She saw her PCP today and they are taking care of her procardia now. She does not need a refill.

## 2022-03-23 NOTE — ANESTHESIA PREPROCEDURE EVALUATION
Anesthesia Evaluation     Patient summary reviewed and Nursing notes reviewed                Airway   Mallampati: II  TM distance: >3 FB  Neck ROM: full  Dental - normal exam     Pulmonary - negative pulmonary ROS and normal exam   Cardiovascular - normal exam    (+) hypertension,       Neuro/Psych- negative ROS  GI/Hepatic/Renal/Endo    (+)   hypothyroidism,     Musculoskeletal (-) negative ROS    Abdominal    Substance History - negative use     OB/GYN    (+) Pregnant, Preeclampsia, pregnancy induced hypertension        Other                        Anesthesia Plan    ASA 3     epidural     Anesthetic plan, all risks, benefits, and alternatives have been provided, discussed and informed consent has been obtained with: patient.       66

## 2023-11-03 RX ORDER — VALACYCLOVIR HYDROCHLORIDE 1 G/1
TABLET, FILM COATED ORAL
Qty: 20 TABLET | Refills: 1 | Status: SHIPPED | OUTPATIENT
Start: 2023-11-03

## 2023-12-01 ENCOUNTER — OFFICE VISIT (OUTPATIENT)
Dept: OBSTETRICS AND GYNECOLOGY | Facility: CLINIC | Age: 33
End: 2023-12-01
Payer: OTHER GOVERNMENT

## 2023-12-01 VITALS
SYSTOLIC BLOOD PRESSURE: 144 MMHG | HEIGHT: 67 IN | WEIGHT: 226 LBS | BODY MASS INDEX: 35.47 KG/M2 | DIASTOLIC BLOOD PRESSURE: 109 MMHG

## 2023-12-01 DIAGNOSIS — Z30.432 ENCOUNTER FOR IUD REMOVAL: ICD-10-CM

## 2023-12-01 DIAGNOSIS — I10 ESSENTIAL HYPERTENSION: ICD-10-CM

## 2023-12-01 DIAGNOSIS — Z01.419 WOMEN'S ANNUAL ROUTINE GYNECOLOGICAL EXAMINATION: Primary | ICD-10-CM

## 2023-12-01 RX ORDER — ESCITALOPRAM OXALATE 10 MG/1
TABLET ORAL
COMMUNITY
Start: 2023-09-25

## 2023-12-01 NOTE — PROGRESS NOTES
GYN Annual Exam     Chief Complaint   Patient presents with    Gynecologic Exam     AE, Pt would like IUD removed. Last pap 2020 NIL     HPI    Callie Parks is a 33 y.o. female who presents for annual well woman exam.  She is sexually active. Periods are absent with IUD. No intermenstrual bleeding, spotting, or discharge. Performing SBE:completes. She would like IUD removed today, not planning for pregnancy, just no longer desires contraception. Planning to use condoms with IC    BP is elevated today, she reports compliance with procardia. States she is anxious about this visit. She monitors BP at home and reports normal parameters at home. 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     OB History          2    Para   2    Term   2       0    AB   0    Living   2         SAB   0    IAB   0    Ectopic   0    Molar   0    Multiple   0    Live Births   2                LMP- amenorrheic   Current contraception: IUD, planning to use condoms moving forward  Last Pap-  NIL  History of abnormal Pap smear: no  History of STD-denies  Family history of uterine, colon or ovarian cancer: no  Family history of breast cancer: no  Gardasil Vaccine: completed    Past Medical History:   Diagnosis Date    Elevated liver enzymes     Gestational hypertension     Hypertension        Past Surgical History:   Procedure Laterality Date    ADENOIDECTOMY       SECTION N/A 12/15/2018    Procedure:  SECTION PRIMARY;  Surgeon: Felicia Boswell MD;  Location: [facility] LABOR DELIVERY;  Service: Obstetrics/Gynecology    WISDOM TOOTH EXTRACTION           Current Outpatient Medications:     escitalopram (LEXAPRO) 10 MG tablet, Take one 10 mg tablet by mouth daily at dinner, Disp: , Rfl:     NIFEdipine XL (PROCARDIA XL) 60 MG 24 hr tablet, Take 1 tablet by mouth Daily., Disp: 90 tablet, Rfl: 3    Prenatal Vit-Fe Fumarate-FA (prenatal vitamin 27-0.8) 27-0.8 MG tablet tablet, Take  by mouth Daily., Disp: , Rfl:      "Synthroid 50 MCG tablet, TAKE 1 TABLET BY MOUTH DAILY, Disp: 30 tablet, Rfl: 1    valACYclovir (Valtrex) 1000 MG tablet, Take 2000mg BID x 1 day PRN symptoms, Disp: 20 tablet, Rfl: 1    vitamin B-12 (CYANOCOBALAMIN) 1000 MCG tablet, Take 1 tablet by mouth Daily., Disp: , Rfl:     VITAMIN D PO, Take  by mouth., Disp: , Rfl:     vitamin E 1000 UNIT capsule, Take 1 capsule by mouth Daily., Disp: , Rfl:     Allergies   Allergen Reactions    Shrimp Nausea And Vomiting       Social History     Tobacco Use    Smoking status: Never    Smokeless tobacco: Never   Vaping Use    Vaping Use: Never used   Substance Use Topics    Alcohol use: No    Drug use: No       Family History   Problem Relation Age of Onset    Deep vein thrombosis Mother 45    Pulmonary embolism Mother 45    Deep vein thrombosis Maternal Grandfather         30's    Breast cancer Neg Hx     Ovarian cancer Neg Hx     Uterine cancer Neg Hx     Colon cancer Neg Hx        Review of Systems   Constitutional:  Negative for chills, fatigue and fever.   Gastrointestinal:  Negative for abdominal distention, abdominal pain, nausea and vomiting.   Genitourinary:  Negative for breast discharge, breast lump, breast pain, dysuria, frequency, menstrual problem, pelvic pain, pelvic pressure, urgency, vaginal bleeding, vaginal discharge and vaginal pain.   Musculoskeletal:  Negative for gait problem.   Skin:  Negative for rash.   Neurological:  Negative for dizziness and headache.   Psychiatric/Behavioral:  Negative for behavioral problems.        BP (!) 144/109   Ht 170.2 cm (67\")   Wt 103 kg (226 lb)   Breastfeeding No   BMI 35.40 kg/m²     Physical Exam  Constitutional:       General: She is not in acute distress.     Appearance: Normal appearance. She is obese. She is not ill-appearing, toxic-appearing or diaphoretic.   Genitourinary:      Vulva, bladder and urethral meatus normal.      No lesions in the vagina.      Right Labia: No rash, tenderness, lesions, skin " changes or Bartholin's cyst.     Left Labia: No tenderness, lesions, skin changes, Bartholin's cyst or rash.     No labial fusion noted.      No inguinal adenopathy present in the right or left side.     No vaginal discharge, erythema, tenderness, bleeding or ulceration.      No vaginal prolapse present.     No vaginal atrophy present.       Right Adnexa: not tender, not full, not palpable, no mass present and not absent.     Left Adnexa: not tender, not full, not palpable, no mass present and not absent.     No cervical motion tenderness, discharge, friability, lesion, polyp, nabothian cyst or eversion.      IUD strings visualized.      Uterus is not enlarged, fixed, tender, irregular or prolapsed.      No uterine mass detected.     No urethral tenderness or mass present.      Pelvic exam was performed with patient in the lithotomy position.   Breasts:     Breasts are symmetrical.      Right: Present. No swelling, bleeding, inverted nipple, mass, nipple discharge, skin change, tenderness or breast implant.      Left: Present. No swelling, bleeding, inverted nipple, mass, nipple discharge, skin change, tenderness or breast implant.   HENT:      Head: Normocephalic and atraumatic.   Eyes:      Pupils: Pupils are equal, round, and reactive to light.   Cardiovascular:      Rate and Rhythm: Normal rate.   Pulmonary:      Effort: Pulmonary effort is normal.   Abdominal:      General: There is no distension.      Palpations: Abdomen is soft. There is no mass.      Tenderness: There is no abdominal tenderness. There is no guarding.      Hernia: No hernia is present. There is no hernia in the left inguinal area or right inguinal area.   Musculoskeletal:         General: Normal range of motion.      Cervical back: Normal range of motion and neck supple. No tenderness.   Lymphadenopathy:      Cervical: No cervical adenopathy.      Upper Body:      Right upper body: No supraclavicular, axillary or pectoral adenopathy.       Left upper body: No supraclavicular, axillary or pectoral adenopathy.      Lower Body: No right inguinal adenopathy. No left inguinal adenopathy.   Neurological:      General: No focal deficit present.      Mental Status: She is alert and oriented to person, place, and time.      Cranial Nerves: No cranial nerve deficit.   Skin:     General: Skin is warm and dry.   Psychiatric:         Mood and Affect: Mood normal.         Behavior: Behavior normal.         Thought Content: Thought content normal.         Judgment: Judgment normal.   Vitals and nursing note reviewed.       CC:Here for IUD removal    Type of IUD:  Mirena  Date of insertion:  6/9/2021  Reason for removal:   no longer desires contraception   Other relevant history/information:  none    Procedure Time Out Documentation      Procedure Details  IUD strings visible:  yes  Local anesthesia:  None  Tenaculum used:  None  Removal:  IUD strings grasped and IUD removed intact with gentle traction.  The patient tolerated the procedure well.    All appropriate instructions regarding removal were reviewed.    Tolerated well  No apparent complications  Post procedure diagnosis : IUD removal     Plans for contraception:  condoms    Other follow-up needed:  none    The patient was advised to call for any fever or for prolonged or severe pain or bleeding. She was advised to use NSAID as needed for mild to moderate pain.     Assessment   Diagnoses and all orders for this visit:    1. Women's annual routine gynecological examination (Primary)  -     IGP, Apt HPV,rfx 16 / 18,45    2. Encounter for IUD removal    3. Essential hypertension       Plan   Well woman exam: Pap smear collected. Recommend MVI daily.    Contraception: declines, planning to use condoms   STD: Enc condoms. Desires STD screen today- No.   Smoking status: nonsmoker   Encouraged annual mammogram screening starting at age 40. Instructed on how to perform SBE. Encouraged breast health self  awareness.  6.    Encouraged 150 minutes of exercise per week if not medially contraindicated.   7.    Obese by BMI 35.40  8.    Encouraged to recheck BP before leaving. She reports compliance with antihypertensive medications. She also monitors BP at home. 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    IUD removed without difficulty. Discussed expectations to returning to menses following removal. Call if saturating a pad an hour for 2 hours in a row. Repeat /100. She will continue to monitor and f/u with PCP if no improvement     Return in about 1 year (around 12/1/2024) for Annual physical, With Dr Boswell.    Cary Neri, APRN  12/1/2023  13:50 EST

## 2023-12-06 LAB
CYTOLOGIST CVX/VAG CYTO: NORMAL
CYTOLOGY CVX/VAG DOC CYTO: NORMAL
CYTOLOGY CVX/VAG DOC THIN PREP: NORMAL
DX ICD CODE: NORMAL
HIV 1 & 2 AB SER-IMP: NORMAL
HPV I/H RISK 4 DNA CVX QL PROBE+SIG AMP: NEGATIVE
OTHER STN SPEC: NORMAL
STAT OF ADQ CVX/VAG CYTO-IMP: NORMAL

## 2024-02-23 ENCOUNTER — INITIAL PRENATAL (OUTPATIENT)
Dept: OBSTETRICS AND GYNECOLOGY | Facility: CLINIC | Age: 34
End: 2024-02-23
Payer: OTHER GOVERNMENT

## 2024-02-23 VITALS — DIASTOLIC BLOOD PRESSURE: 104 MMHG | BODY MASS INDEX: 36.34 KG/M2 | SYSTOLIC BLOOD PRESSURE: 156 MMHG | WEIGHT: 232 LBS

## 2024-02-23 DIAGNOSIS — Z34.91 PRENATAL CARE IN FIRST TRIMESTER: Primary | ICD-10-CM

## 2024-02-23 DIAGNOSIS — O35.8XX0 CYSTIC HYGROMA OF FETUS IN SINGLETON PREGNANCY: ICD-10-CM

## 2024-02-23 DIAGNOSIS — O10.919 CHRONIC HYPERTENSION AFFECTING PREGNANCY: ICD-10-CM

## 2024-02-23 DIAGNOSIS — E03.9 HYPOTHYROIDISM, UNSPECIFIED TYPE: ICD-10-CM

## 2024-02-23 LAB
B-HCG UR QL: POSITIVE
CLARITY, POC: CLEAR
COLOR UR: YELLOW
EXPIRATION DATE: ABNORMAL
GLUCOSE UR STRIP-MCNC: NEGATIVE MG/DL
INTERNAL NEGATIVE CONTROL: ABNORMAL
INTERNAL POSITIVE CONTROL: ABNORMAL
LEUKOCYTE EST, POC: NEGATIVE
Lab: ABNORMAL
NITRITE UR-MCNC: NEGATIVE MG/ML
PROT UR STRIP-MCNC: NEGATIVE MG/DL

## 2024-02-23 PROCEDURE — 0501F PRENATAL FLOW SHEET: CPT | Performed by: OBSTETRICS & GYNECOLOGY

## 2024-02-23 PROCEDURE — 81025 URINE PREGNANCY TEST: CPT | Performed by: OBSTETRICS & GYNECOLOGY

## 2024-02-23 NOTE — PROGRESS NOTES
Initial OB Visit    CC- Missed Menses    Callie Parks is being seen today for her first obstetrical visit.  She is a 33 y.o.  at 10w2d gestation by today's ultrasound   FOB: Rigo Parks  This is a planned pregnancy.   Denies spotting, bleeding, cramping.  # 1 - Date: 12/15/18, Sex: Female, Weight: 2740 g (6 lb 0.7 oz), GA: 37w2d, Delivery: , Low Transverse, Apgar1: 8, Apgar5: 9, Living: Living, Birth Comments: felecia 1     # 2 - Date: 21, Sex: Male, Weight: 2900 g (6 lb 6.3 oz), GA: 36w0d, Delivery: , Low Transverse, Apgar1: 8, Apgar5: 8, Living: Living, Birth Comments: None    # 3 - Date: None, Sex: None, Weight: None, GA: None, Delivery: None, Apgar1: None, Apgar5: None, Living: None, Birth Comments: None      Current obstetric complaints: mild nausea, no vomiting   Prior obstetric issues, potential pregnancy concerns:  for arrest of dilation, h/o postpartum hemorrhage; h/o superimposed preeclampsia; last pregnancy complicated by fetal pleural effusion  Family history of genetic issues (includes FOB):  FOB's brother had gastroschisis/trisomy 13 and passed away 2-3 months after birth.    Prior infections concerning in pregnancy (Rash, fever since LMP): none  Varicella Hx - reports vaccination   Prior testing for Cystic Fibrosis Carrier or Sickle Cell Trait- none  History of abnormal pap smears: none  History of STIs: no  Prepregnancy BMI - Body mass index is 36.34 kg/m².    Past Medical History:   Diagnosis Date    Elevated liver enzymes     Gestational hypertension     Hypertension        Past Surgical History:   Procedure Laterality Date    ADENOIDECTOMY       SECTION N/A 12/15/2018    Procedure:  SECTION PRIMARY;  Surgeon: Felicia Boswell MD;  Location: [facility] LABOR DELIVERY;  Service: Obstetrics/Gynecology    WISDOM TOOTH EXTRACTION         Current Outpatient Medications:     NIFEdipine XL (PROCARDIA XL) 60 MG 24 hr tablet, Take 1 tablet by mouth  Daily., Disp: 90 tablet, Rfl: 3    Prenatal Vit-Fe Fumarate-FA (prenatal vitamin 27-0.8) 27-0.8 MG tablet tablet, Take  by mouth Daily., Disp: , Rfl:     Synthroid 50 MCG tablet, TAKE 1 TABLET BY MOUTH DAILY, Disp: 30 tablet, Rfl: 1    valACYclovir (Valtrex) 1000 MG tablet, Take 2000mg BID x 1 day PRN symptoms, Disp: 20 tablet, Rfl: 1    vitamin B-12 (CYANOCOBALAMIN) 1000 MCG tablet, Take 1 tablet by mouth Daily., Disp: , Rfl:     VITAMIN D PO, Take  by mouth., Disp: , Rfl:     vitamin E 1000 UNIT capsule, Take 1 capsule by mouth Daily., Disp: , Rfl:     Allergies   Allergen Reactions    Shrimp Nausea And Vomiting       Social History     Socioeconomic History    Marital status:    Tobacco Use    Smoking status: Never    Smokeless tobacco: Never   Vaping Use    Vaping Use: Never used   Substance and Sexual Activity    Alcohol use: No    Drug use: No    Sexual activity: Yes     Partners: Male       Family History   Problem Relation Age of Onset    Deep vein thrombosis Mother 45    Pulmonary embolism Mother 45    Deep vein thrombosis Maternal Grandfather         30's    Breast cancer Neg Hx     Ovarian cancer Neg Hx     Uterine cancer Neg Hx     Colon cancer Neg Hx        Review of systems     Constitutional: negative for chills, fevers and for fatigue  Eyes: negative  Ears, nose, mouth, throat, and face: negative for hearing loss and nasal congestion  Respiratory: negative for asthma and wheezing  Cardiovascular: negative for chest pain and dyspnea  Gastrointestinal: negative for dyspepsia, dysphagia abdominal pain  Genitourinary:negative for urinary incontinence  Integument/breast: negative for breast lump  Hematologic/lymphatic: negative for bleeding  Musculoskeletal:negative for aches  Neurological: negative for numbness/tingling  Behavioral/Psych: negative for anhedonia  Allergic/Immunologic: negative for rash, allergy    Objective    BP (!) 156/104   Wt 105 kg (232 lb)   LMP 12/01/2023 (Approximate)    BMI 36.34 kg/m²       General Appearance:    Alert, cooperative, in no acute distress, habitus obese   Head:    Normocephalic, without obvious abnormality, atraumatic   Eyes:            Lids and lashes normal, conjunctivae and sclerae normal, no   icterus, no pallor, corneas clear   Ears:    Ears appear intact with no abnormalities noted       Neck:   No adenopathy, supple, trachea midline, no thyromegaly   Back:     No kyphosis present, no scoliosis present,                       Lungs:     Clear to auscultation,respirations regular, even and                   unlabored    Heart:    Regular rhythm and normal rate, normal S1 and S2, no            murmur, no gallop, no rub, no click   Breast Exam:    declined   Abdomen:     Normal bowel sounds, no masses, no organomegaly, soft        non-tender, non-distended, no guarding, no rebound                 tenderness   Genitalia:  Declined as recently performed and pap UTD   Extremities:   Moves all extremities well, no edema, no cyanosis, no              redness   Pulses:   Pulses palpable and equal bilaterally   Skin:   No bleeding, bruising or rash   Lymph nodes:   No palpable adenopathy   Neurologic:   Sensation intact, A&O times 3      Assessment  Pregnancy at 10w2d, cystic hygroma noted  Chronic hypertension, on medication   H/o preeclampsia in prior pregnancy  H/o CS  H/o postpartum hemorrhage  Hypothyroidism  Obesity     Plan    Initial labs drawn, GC/CHL screen done  Reviewed US finding and possible implications including increase in aneuploidy. Agrees to MT21 today and will refer to M.    Elevated BP: Pt took BP at home this AM and was 125/85, repeat BP manually did improve. She is on Procardia 60XL qAM.  CMP, P:C ordered. She is asymptomatic. Will continue to monitor BP at home but feels this elevation is in part due to discussion and also due to white coat HTN>  Patient is on Prenatal vitamins  Problem list reviewed and updated.  Aware of nature of practice  and hospital.  We reviewed testing in pregnancy including HIV testing and urine drug screen.    She is interested in cell free DNA. Aware of limitations of testing, possibility of need for additional testing (such as amniocentesis), possibility of out of pocket expense, possibility of false positive/false negative results.    Thyroid disease- will repeat TSH Synthroid 50mcg daily  Start ASA 81mg daily  Counseled on limitations of ultrasound in pregnancy.  All questions answered.           Felicia Boswell MD

## 2024-02-24 LAB
ABO GROUP BLD: ABNORMAL
ALBUMIN SERPL-MCNC: 4.3 G/DL (ref 3.9–4.9)
ALBUMIN/GLOB SERPL: 1.5 {RATIO} (ref 1.2–2.2)
ALP SERPL-CCNC: 73 IU/L (ref 44–121)
ALT SERPL-CCNC: 91 IU/L (ref 0–32)
AMPHETAMINES UR QL SCN: NEGATIVE NG/ML
AST SERPL-CCNC: 51 IU/L (ref 0–40)
BARBITURATES UR QL SCN: NEGATIVE NG/ML
BASOPHILS # BLD AUTO: 0.1 X10E3/UL (ref 0–0.2)
BASOPHILS NFR BLD AUTO: 1 %
BENZODIAZ UR QL: NEGATIVE NG/ML
BILIRUB SERPL-MCNC: 0.5 MG/DL (ref 0–1.2)
BLD GP AB SCN SERPL QL: NEGATIVE
BUN SERPL-MCNC: 8 MG/DL (ref 6–20)
BUN/CREAT SERPL: 14 (ref 9–23)
BZE UR QL: NEGATIVE NG/ML
CALCIUM SERPL-MCNC: 9.4 MG/DL (ref 8.7–10.2)
CANNABINOIDS UR QL SCN: NEGATIVE NG/ML
CHLORIDE SERPL-SCNC: 102 MMOL/L (ref 96–106)
CO2 SERPL-SCNC: 19 MMOL/L (ref 20–29)
CREAT SERPL-MCNC: 0.59 MG/DL (ref 0.57–1)
EGFRCR SERPLBLD CKD-EPI 2021: 123 ML/MIN/1.73
EOSINOPHIL # BLD AUTO: 0 X10E3/UL (ref 0–0.4)
EOSINOPHIL NFR BLD AUTO: 0 %
ERYTHROCYTE [DISTWIDTH] IN BLOOD BY AUTOMATED COUNT: 13 % (ref 11.7–15.4)
FT4I SERPL CALC-MCNC: 2.4 (ref 1.2–4.9)
GLOBULIN SER CALC-MCNC: 2.8 G/DL (ref 1.5–4.5)
GLUCOSE SERPL-MCNC: 93 MG/DL (ref 70–99)
HBV SURFACE AG SERPL QL IA: NEGATIVE
HCT VFR BLD AUTO: 43.8 % (ref 34–46.6)
HCV IGG SERPL QL IA: NON REACTIVE
HGB BLD-MCNC: 14.9 G/DL (ref 11.1–15.9)
HIV 1+2 AB+HIV1 P24 AG SERPL QL IA: NON REACTIVE
IMM GRANULOCYTES # BLD AUTO: 0 X10E3/UL (ref 0–0.1)
IMM GRANULOCYTES NFR BLD AUTO: 0 %
LYMPHOCYTES # BLD AUTO: 2 X10E3/UL (ref 0.7–3.1)
LYMPHOCYTES NFR BLD AUTO: 22 %
MCH RBC QN AUTO: 30.7 PG (ref 26.6–33)
MCHC RBC AUTO-ENTMCNC: 34 G/DL (ref 31.5–35.7)
MCV RBC AUTO: 90 FL (ref 79–97)
METHADONE UR QL SCN: NEGATIVE NG/ML
MONOCYTES # BLD AUTO: 0.5 X10E3/UL (ref 0.1–0.9)
MONOCYTES NFR BLD AUTO: 6 %
NEUTROPHILS # BLD AUTO: 6.6 X10E3/UL (ref 1.4–7)
NEUTROPHILS NFR BLD AUTO: 71 %
OPIATES UR QL: NEGATIVE NG/ML
PCP UR QL SCN: NEGATIVE NG/ML
PLATELET # BLD AUTO: 211 X10E3/UL (ref 150–450)
POTASSIUM SERPL-SCNC: 4.1 MMOL/L (ref 3.5–5.2)
PROPOXYPH UR QL SCN: NEGATIVE NG/ML
PROT SERPL-MCNC: 7.1 G/DL (ref 6–8.5)
RBC # BLD AUTO: 4.85 X10E6/UL (ref 3.77–5.28)
RH BLD: POSITIVE
RPR SER QL: NON REACTIVE
RUBV IGG SERPL IA-ACNC: 0.99 INDEX
SODIUM SERPL-SCNC: 138 MMOL/L (ref 134–144)
T3RU NFR SERPL: 22 % (ref 24–39)
T4 SERPL-MCNC: 11.1 UG/DL (ref 4.5–12)
TSH SERPL DL<=0.005 MIU/L-ACNC: 1.13 UIU/ML (ref 0.45–4.5)
WBC # BLD AUTO: 9.2 X10E3/UL (ref 3.4–10.8)

## 2024-02-25 LAB
BACTERIA UR CULT: NO GROWTH
BACTERIA UR CULT: NORMAL

## 2024-02-27 LAB
C TRACH RRNA SPEC QL NAA+PROBE: NEGATIVE
N GONORRHOEA RRNA SPEC QL NAA+PROBE: NEGATIVE
T VAGINALIS RRNA SPEC QL NAA+PROBE: NEGATIVE

## 2024-03-01 LAB
CFDNA.FET/CFDNA.TOTAL SFR FETUS: NORMAL %
CITATION REF LAB TEST: NORMAL
FET 13+18+21+X+Y ANEUP PLAS.CFDNA: NEGATIVE
FET CHR 21 TS PLAS.CFDNA QL: NEGATIVE
FET MS X RISK WBC.DNA+CFDNA QL: NOT DETECTED
FET SEX PLAS.CFDNA DOSAGE CFDNA: NORMAL
FET TS 13 RISK PLAS.CFDNA QL: NEGATIVE
FET TS 18 RISK WBC.DNA+CFDNA QL: NEGATIVE
FET X + Y ANEUP RISK PLAS.CFDNA SEQ-IMP: NOT DETECTED
GA EST FROM CONCEPTION DATE: NORMAL D
GESTATIONAL AGE > 9:: YES
LAB DIRECTOR NAME PROVIDER: NORMAL
LAB DIRECTOR NAME PROVIDER: NORMAL
LABORATORY COMMENT REPORT: NORMAL
LIMITATIONS OF THE TEST: NORMAL
NEGATIVE PREDICTIVE VALUE: NORMAL
NOTE: NORMAL
PERFORMANCE CHARACTERISTICS: NORMAL
POSITIVE PREDICTIVE VALUE: NORMAL
REF LAB TEST METHOD: NORMAL
TEST PERFORMANCE INFO SPEC: NORMAL

## 2024-03-05 ENCOUNTER — TELEPHONE (OUTPATIENT)
Dept: OBSTETRICS AND GYNECOLOGY | Facility: CLINIC | Age: 34
End: 2024-03-05
Payer: OTHER GOVERNMENT

## 2024-03-05 NOTE — TELEPHONE ENCOUNTER
----- Message from Felicia Boswell MD sent at 3/2/2024  3:39 PM EST -----  Please let patient know that aneuploidy screening was negative for trisomy 21/18/13.  If she wishes to know sex, testing shows consistent with male.

## 2024-03-12 ENCOUNTER — TRANSCRIBE ORDERS (OUTPATIENT)
Dept: ULTRASOUND IMAGING | Facility: HOSPITAL | Age: 34
End: 2024-03-12
Payer: OTHER GOVERNMENT

## 2024-03-12 DIAGNOSIS — O10.919 CHRONIC HYPERTENSION AFFECTING PREGNANCY: Primary | ICD-10-CM

## 2024-03-12 DIAGNOSIS — O35.8XX0 CYSTIC HYGROMA OF FETUS IN SINGLETON PREGNANCY: ICD-10-CM

## 2024-03-13 ENCOUNTER — OFFICE VISIT (OUTPATIENT)
Dept: OBSTETRICS AND GYNECOLOGY | Facility: CLINIC | Age: 34
End: 2024-03-13
Payer: OTHER GOVERNMENT

## 2024-03-13 ENCOUNTER — HOSPITAL ENCOUNTER (OUTPATIENT)
Dept: ULTRASOUND IMAGING | Facility: HOSPITAL | Age: 34
Discharge: HOME | End: 2024-03-13
Admitting: OBSTETRICS & GYNECOLOGY
Payer: OTHER GOVERNMENT

## 2024-03-13 VITALS
BODY MASS INDEX: 36.82 KG/M2 | DIASTOLIC BLOOD PRESSURE: 104 MMHG | WEIGHT: 234.6 LBS | HEART RATE: 84 BPM | HEIGHT: 67 IN | TEMPERATURE: 99.1 F | SYSTOLIC BLOOD PRESSURE: 162 MMHG

## 2024-03-13 DIAGNOSIS — O35.8XX0 CYSTIC HYGROMA OF FETUS IN SINGLETON PREGNANCY: ICD-10-CM

## 2024-03-13 DIAGNOSIS — O10.919 CHRONIC HYPERTENSION AFFECTING PREGNANCY: ICD-10-CM

## 2024-03-13 DIAGNOSIS — O35.8XX0 CYSTIC HYGROMA OF FETUS IN SINGLETON PREGNANCY: Primary | ICD-10-CM

## 2024-03-13 PROCEDURE — 76801 OB US < 14 WKS SINGLE FETUS: CPT

## 2024-03-13 RX ORDER — HYDROXYZINE HYDROCHLORIDE 25 MG/1
25 TABLET, FILM COATED ORAL 3 TIMES DAILY PRN
Qty: 60 TABLET | Refills: 6 | Status: SHIPPED | OUTPATIENT
Start: 2024-03-13

## 2024-03-13 RX ORDER — ASPIRIN 81 MG/1
81 TABLET ORAL DAILY
COMMUNITY

## 2024-03-13 RX ORDER — NIFEDIPINE 60 MG/1
60 TABLET, EXTENDED RELEASE ORAL 2 TIMES DAILY
Qty: 180 TABLET | Refills: 7 | Status: SHIPPED | OUTPATIENT
Start: 2024-03-13

## 2024-03-13 NOTE — PROGRESS NOTES
Pt reports that she is doing well and denies vaginal bleeding, cramping, contractions or LOF at this time. Reviewed when to call OB office or present to L&D for evaluation with symptoms such as decreased fetal movement, vaginal bleeding, LOF or ctxs. Pt verbalized understanding. Denies HA, visual changes or epigastric pain. Denies any additional complaints at time of appointment. Next OB appointment scheduled for 3/27.    Vitals:    03/13/24 1110   BP: (!) 162/106   Pulse:    Temp:

## 2024-03-13 NOTE — LETTER
2024     Felicia Boswell MD  [address]  [address]  [facility] [address]    Patient: Callie Parks   YOB: 1990   Date of Visit: 3/13/2024       Dear Felicia Boswell MD,    Thank you for referring Callie Parks to me for evaluation. Below is a copy of my consult note.    If you have questions, please do not hesitate to call me. I look forward to following Callie along with you.         Sincerely,        Cary Mahmood MD      MATERNAL FETAL MEDICINE Consult Note    Dear Dr Felicia Boswell MD:    Thank you for your kind referral of Callie Parks.  As you know, she is a 33 y.o.   at  13 0/7 weeks gestation (Estimated Date of Delivery: 24). This is a consult.      Her antepartum course is complicated by:  Cystic hygroma, not found  CHTN    Aneuploidy Screening: low risk    HPI: Today, she denies headache, blurry vision, RUQ pain. No vaginal bleeding, no contractions.     Review of History:  Past Medical History:   Diagnosis Date   • Elevated liver enzymes    • Gestational hypertension    • Hypertension     CHTN     Past Surgical History:   Procedure Laterality Date   • ADENOIDECTOMY     •  SECTION N/A 12/15/2018    Procedure:  SECTION PRIMARY;  Surgeon: Felicia Boswell MD;  Location: [facility] LABOR DELIVERY;  Service: Obstetrics/Gynecology   • WISDOM TOOTH EXTRACTION       Social History     Socioeconomic History   • Marital status:    Tobacco Use   • Smoking status: Never   • Smokeless tobacco: Never   Vaping Use   • Vaping status: Never Used   Substance and Sexual Activity   • Alcohol use: No   • Drug use: No   • Sexual activity: Yes     Partners: Male     Family History   Problem Relation Age of Onset   • Deep vein thrombosis Mother 45   • Pulmonary embolism Mother 45   • Deep vein thrombosis Maternal Grandfather         30's   • Breast cancer Neg Hx    • Ovarian cancer Neg Hx    • Uterine cancer Neg Hx    • Colon cancer Neg Hx       Allergies    Allergen Reactions   • Shrimp Nausea And Vomiting      Current Outpatient Medications on File Prior to Visit   Medication Sig Dispense Refill   • aspirin 81 MG EC tablet Take 1 tablet by mouth Daily.     • NIFEdipine XL (PROCARDIA XL) 60 MG 24 hr tablet Take 1 tablet by mouth Daily. 90 tablet 3   • Prenatal Vit-Fe Fumarate-FA (prenatal vitamin 27-0.8) 27-0.8 MG tablet tablet Take  by mouth Daily.     • Synthroid 50 MCG tablet TAKE 1 TABLET BY MOUTH DAILY 30 tablet 1   • valACYclovir (Valtrex) 1000 MG tablet Take 2000mg BID x 1 day PRN symptoms 20 tablet 1   • vitamin B-12 (CYANOCOBALAMIN) 1000 MCG tablet Take 1 tablet by mouth Daily.     • VITAMIN D PO Take  by mouth.     • vitamin E 1000 UNIT capsule Take 1 capsule by mouth Daily.       No current facility-administered medications on file prior to visit.        Past obstetric, gynecological, medical, surgical, family and social history reviewed.  Relevant lab work and imaging reviewed.    Review of systems  Constitutional:  denies fever, chills, malaise.   ENT/Mouth:  denies sore throat, tinnitus  Eyes: denies vision changes/pain  CV:  denies chest pain  Respiratory:  denies cough/SOB  GI:  denies N/V, diarrhea, abdominal pain.    :   denies dysuria  Skin:  denies lesions or pruritus   Neuro:  denies weakness, focal neurologic symptoms    Vitals:    03/13/24 1208 03/13/24 1209 03/13/24 1212 03/13/24 1213   BP: (!) 168/112 (!) 168/111 160/100 (!) 162/104   BP Location: Right arm Left arm Right arm Left arm   Patient Position: Sitting Sitting Sitting Sitting   Pulse: 102 84     Temp:       TempSrc:       Weight:       Height:           PHYSICAL EXAM   GENERAL: Not in acute distress, AAOx3, pleasant  CARDIO: regular rate and rhythm  PULM: symmetric chest rise, speaking in complete sentences without difficulty  NEURO: awake, alert and oriented to person, place, and time  ABDOMINAL: No fundal tenderness, no rebound or guarding, gravid  EXTREMITIES: no bilateral  lower extremity edema/tenderness  SKIN: Warm, well-perfused      ULTRASOUND   Please view full ultrasound note on Imaging tab in ViewPoint.  Live viable early intrauterine pregnancy.   Size consistent with dates.   NT 1.9 mm, which is normal.    bpm.   No evidence of cystic hygroma.  Grossly normal appearing anatomy.      ASSESSMENT/COUNSELIN y.o.   at  13 0/7 weeks gestation (Estimated Date of Delivery: 24).     -Pregnancy  [ X ] stable  [   ] improving [  ] worsening    Diagnoses and all orders for this visit:    1. Cystic hygroma of fetus in block pregnancy (Primary)    2. Chronic hypertension affecting pregnancy  Overview:  On procardia  Baseline labs  On ASA  Repeat CMP and 24 hour urine in first trimester      Other orders  -     NIFEdipine XL (PROCARDIA XL) 60 MG 24 hr tablet; Take 1 tablet by mouth 2 (Two) Times a Day.  Dispense: 180 tablet; Refill: 7  -     hydrOXYzine (ATARAX) 25 MG tablet; Take 1 tablet by mouth 3 (Three) Times a Day As Needed for Itching.  Dispense: 60 tablet; Refill: 6         Cystic Hygroma, not found:  A cystic hygroma is a congenital thin-walled cyst that contains lymphatic fluid. It can be septated or non-septated and is commonly located in the soft tissue at the posterior neck region, but may extend cephalad to engulf fetal head or caudad to dorsum of fetus.  I do see what is being referenced in previous imaging, but it is from around 10 weeks where it is difficult to assess what is normal with regards to this posterior neck area.  Today, we do not see evidence of this, which is reassuring.  Discussed associations with aneuploidy (low risk NIPT) and heart defects making up the majority of cases, although the list is massive for what can cause a cystic hygroma.  We will see her again for anatomy and do detailed anatomy.  Depending on this, we will have a low threshold for ECHO/further workup.  I am cautiously optimistic today not seeing a cystic  hygroma.      CHTN  On procardia 60 daily--upped today.    Risks of chronic hypertension include intrauterine growth restriction, increased risk of preeclampsia, increased risk of premature delivery, and increased risk for placental abruption.  I reassured her that with mild hypertension, no underlying cardiac disease, and normal renal function, most women do well in pregnancy.    I explained that acceptable blood pressures in pregnancies with chronic hypertension and without renal damage are 120-140/70-90s. Newer data from the [facility] (2022, CHAP TRIAL), supports more aggressive bp treatment to below 140/90 (previously ,160 in CHTN) and that this does not impair fetal growth or well-being but reduces risks of pre-eclampsia,  birth, and other pregnancy morbidity.  I discussed this with the patient.  She is in the 160's systolic today so we will have her send pressures later today--she said she is nervous and denies HA/other symptoms at this time.  She will send us pressures later and up her procardia to BID.      I recommended serial growth ultrasounds every 4 weeks  to ensure appropriate fetal growth. In addition,  fetal testing 1-2x weekly should be initiated around 32 weeks gestation.  I would recommend a 38 week delivery given CHTN on meds.   She may require closer to 37 depending on her control closer to delivery.      Discussed importance of ambulatory monitoring at different times during the day.      Summary of Plan  -Serial growth ultrasounds every 4 weeks after anatomy (OB if no evidence of cystic hygroma/issues on anatomy)   -Starting at 32 weeks: Weekly fetal  surveillance until delivery by primary OB  -Low threshold for further workup for cystic hygroma if anything new appears.    -Delivery 39 weeks unless indicated sooner    Follow-up: Anatomy    Thank you for the consult and opportunity to care for this patient.  Please feel free to reach out with any questions or concerns.       I spent 30 minutes caring for this patient on this date of service. This time includes time spent by me in the following activities: preparing for the visit, reviewing tests, obtaining and/or reviewing a separately obtained history, performing a medically appropriate examination and/or evaluation, counseling and educating the patient/family/caregiver and independently interpreting results and communicating that information with the patient/family/caregiver with greater than 50% spent in counseling and coordination of care.       I spent 3 minutes on the separately reported service of US imaging not included in the time used to support the E/M service also reported today.      Cary Mahmood MD FACOG  Maternal Fetal Medicine-Psychiatric  Office: [phone]  [email or web address]

## 2024-03-13 NOTE — PROGRESS NOTES
MATERNAL FETAL MEDICINE Consult Note    Dear Dr Felicia Boswell MD:    Thank you for your kind referral of Callie Parks.  As you know, she is a 33 y.o.   at  13 0/7 weeks gestation (Estimated Date of Delivery: 24). This is a consult.      Her antepartum course is complicated by:  Cystic hygroma, not found  CHTN    Aneuploidy Screening: low risk    HPI: Today, she denies headache, blurry vision, RUQ pain. No vaginal bleeding, no contractions.     Review of History:  Past Medical History:   Diagnosis Date    Elevated liver enzymes     Gestational hypertension     Hypertension     CHTN     Past Surgical History:   Procedure Laterality Date    ADENOIDECTOMY       SECTION N/A 12/15/2018    Procedure:  SECTION PRIMARY;  Surgeon: Felicia Boswell MD;  Location: [facility] LABOR DELIVERY;  Service: Obstetrics/Gynecology    WISDOM TOOTH EXTRACTION       Social History     Socioeconomic History    Marital status:    Tobacco Use    Smoking status: Never    Smokeless tobacco: Never   Vaping Use    Vaping status: Never Used   Substance and Sexual Activity    Alcohol use: No    Drug use: No    Sexual activity: Yes     Partners: Male     Family History   Problem Relation Age of Onset    Deep vein thrombosis Mother 45    Pulmonary embolism Mother 45    Deep vein thrombosis Maternal Grandfather         30's    Breast cancer Neg Hx     Ovarian cancer Neg Hx     Uterine cancer Neg Hx     Colon cancer Neg Hx       Allergies   Allergen Reactions    Shrimp Nausea And Vomiting      Current Outpatient Medications on File Prior to Visit   Medication Sig Dispense Refill    aspirin 81 MG EC tablet Take 1 tablet by mouth Daily.      NIFEdipine XL (PROCARDIA XL) 60 MG 24 hr tablet Take 1 tablet by mouth Daily. 90 tablet 3    Prenatal Vit-Fe Fumarate-FA (prenatal vitamin 27-0.8) 27-0.8 MG tablet tablet Take  by mouth Daily.      Synthroid 50 MCG tablet TAKE 1 TABLET BY MOUTH DAILY 30 tablet 1    valACYclovir  (Valtrex) 1000 MG tablet Take 2000mg BID x 1 day PRN symptoms 20 tablet 1    vitamin B-12 (CYANOCOBALAMIN) 1000 MCG tablet Take 1 tablet by mouth Daily.      VITAMIN D PO Take  by mouth.      vitamin E 1000 UNIT capsule Take 1 capsule by mouth Daily.       No current facility-administered medications on file prior to visit.        Past obstetric, gynecological, medical, surgical, family and social history reviewed.  Relevant lab work and imaging reviewed.    Review of systems  Constitutional:  denies fever, chills, malaise.   ENT/Mouth:  denies sore throat, tinnitus  Eyes: denies vision changes/pain  CV:  denies chest pain  Respiratory:  denies cough/SOB  GI:  denies N/V, diarrhea, abdominal pain.    :   denies dysuria  Skin:  denies lesions or pruritus   Neuro:  denies weakness, focal neurologic symptoms    Vitals:    24 1208 24 1209 24 1212 24 1213   BP: (!) 168/112 (!) 168/111 160/100 (!) 162/104   BP Location: Right arm Left arm Right arm Left arm   Patient Position: Sitting Sitting Sitting Sitting   Pulse: 102 84     Temp:       TempSrc:       Weight:       Height:           PHYSICAL EXAM   GENERAL: Not in acute distress, AAOx3, pleasant  CARDIO: regular rate and rhythm  PULM: symmetric chest rise, speaking in complete sentences without difficulty  NEURO: awake, alert and oriented to person, place, and time  ABDOMINAL: No fundal tenderness, no rebound or guarding, gravid  EXTREMITIES: no bilateral lower extremity edema/tenderness  SKIN: Warm, well-perfused      ULTRASOUND   Please view full ultrasound note on Imaging tab in ViewPoint.  Live viable early intrauterine pregnancy.   Size consistent with dates.   NT 1.9 mm, which is normal.    bpm.   No evidence of cystic hygroma.  Grossly normal appearing anatomy.      ASSESSMENT/COUNSELIN y.o.   at  13 0/7 weeks gestation (Estimated Date of Delivery: 24).     -Pregnancy  [ X ] stable  [   ] improving [  ]  worsening    Diagnoses and all orders for this visit:    1. Cystic hygroma of fetus in bolck pregnancy (Primary)    2. Chronic hypertension affecting pregnancy  Overview:  On procardia  Baseline labs  On ASA  Repeat CMP and 24 hour urine in first trimester      Other orders  -     NIFEdipine XL (PROCARDIA XL) 60 MG 24 hr tablet; Take 1 tablet by mouth 2 (Two) Times a Day.  Dispense: 180 tablet; Refill: 7  -     hydrOXYzine (ATARAX) 25 MG tablet; Take 1 tablet by mouth 3 (Three) Times a Day As Needed for Itching.  Dispense: 60 tablet; Refill: 6         Cystic Hygroma, not found:  A cystic hygroma is a congenital thin-walled cyst that contains lymphatic fluid. It can be septated or non-septated and is commonly located in the soft tissue at the posterior neck region, but may extend cephalad to engulf fetal head or caudad to dorsum of fetus.  I do see what is being referenced in previous imaging, but it is from around 10 weeks where it is difficult to assess what is normal with regards to this posterior neck area.  Today, we do not see evidence of this, which is reassuring.  Discussed associations with aneuploidy (low risk NIPT) and heart defects making up the majority of cases, although the list is massive for what can cause a cystic hygroma.  We will see her again for anatomy and do detailed anatomy.  Depending on this, we will have a low threshold for ECHO/further workup.  I am cautiously optimistic today not seeing a cystic hygroma.      CHTN  On procardia 60 daily--upped today.    Risks of chronic hypertension include intrauterine growth restriction, increased risk of preeclampsia, increased risk of premature delivery, and increased risk for placental abruption.  I reassured her that with mild hypertension, no underlying cardiac disease, and normal renal function, most women do well in pregnancy.    I explained that acceptable blood pressures in pregnancies with chronic hypertension and without renal damage are  120-140/70-90s. Newer data from the [facility] (2022, CHAP TRIAL), supports more aggressive bp treatment to below 140/90 (previously ,160 in CHTN) and that this does not impair fetal growth or well-being but reduces risks of pre-eclampsia,  birth, and other pregnancy morbidity.  I discussed this with the patient.  She is in the 160's systolic today so we will have her send pressures later today--she said she is nervous and denies HA/other symptoms at this time.  She will send us pressures later and up her procardia to BID.      I recommended serial growth ultrasounds every 4 weeks  to ensure appropriate fetal growth. In addition,  fetal testing 1-2x weekly should be initiated around 32 weeks gestation.  I would recommend a 38 week delivery given CHTN on meds.   She may require closer to 37 depending on her control closer to delivery.      Discussed importance of ambulatory monitoring at different times during the day.      Summary of Plan  -Serial growth ultrasounds every 4 weeks after anatomy (OB if no evidence of cystic hygroma/issues on anatomy)   -Starting at 32 weeks: Weekly fetal  surveillance until delivery by primary OB  -Low threshold for further workup for cystic hygroma if anything new appears.    -Delivery 39 weeks unless indicated sooner    Follow-up: Anatomy    Thank you for the consult and opportunity to care for this patient.  Please feel free to reach out with any questions or concerns.      I spent 30 minutes caring for this patient on this date of service. This time includes time spent by me in the following activities: preparing for the visit, reviewing tests, obtaining and/or reviewing a separately obtained history, performing a medically appropriate examination and/or evaluation, counseling and educating the patient/family/caregiver and independently interpreting results and communicating that information with the patient/family/caregiver with greater than 50% spent in  counseling and coordination of care.       I spent 3 minutes on the separately reported service of US imaging not included in the time used to support the E/M service also reported today.      Cary Mahmood MD [facility] [place]  Maternal Fetal Medicine-[facility]  Office: [phone]  [email or web address]

## 2024-03-27 ENCOUNTER — ROUTINE PRENATAL (OUTPATIENT)
Dept: OBSTETRICS AND GYNECOLOGY | Facility: CLINIC | Age: 34
End: 2024-03-27
Payer: OTHER GOVERNMENT

## 2024-03-27 VITALS — SYSTOLIC BLOOD PRESSURE: 168 MMHG | BODY MASS INDEX: 36.81 KG/M2 | WEIGHT: 235 LBS | DIASTOLIC BLOOD PRESSURE: 122 MMHG

## 2024-03-27 DIAGNOSIS — Z3A.15 15 WEEKS GESTATION OF PREGNANCY: Primary | ICD-10-CM

## 2024-03-27 DIAGNOSIS — O10.919 CHRONIC HYPERTENSION AFFECTING PREGNANCY: ICD-10-CM

## 2024-03-27 LAB
GLUCOSE UR STRIP-MCNC: NEGATIVE MG/DL
PROT UR STRIP-MCNC: ABNORMAL MG/DL

## 2024-03-27 PROCEDURE — 0502F SUBSEQUENT PRENATAL CARE: CPT | Performed by: OBSTETRICS & GYNECOLOGY

## 2024-03-27 NOTE — PROGRESS NOTES
Chief Complaint   Patient presents with    Routine Prenatal Visit      Callie Parks is a 33 y.o.  at 15w0d  here for routine OB visit  Reports no issues. She is asymptomatic. At home today her BP was 126/84, 124/78, and 118/83.  She is taking Procardia 60XL BID. She reports after starting it did feel a little lightheaded in the morning, but now feels fine.    BP (!) 168/122   Wt 107 kg (235 lb)   LMP 2023 (Approximate)   BMI 36.81 kg/m²        Gen: NAD, well appearing  Abd: nontender    See OB Flowsheet    ASSESSMENT/PLAN:  Diagnoses and all orders for this visit:    1. 15 weeks gestation of pregnancy (Primary)  -     POC Urinalysis Dipstick    2. 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  Supplies given for 24 hour urine, repeat CMP ordered  Reviewed US. Plan for anatomy US with AGUILA. Will follow up with us after.     Return in about 4 weeks (around 2024).

## 2024-03-28 LAB — CREAT UR-MCNC: NORMAL MG/DL

## 2024-04-18 ENCOUNTER — LAB (OUTPATIENT)
Dept: LAB | Facility: HOSPITAL | Age: 34
End: 2024-04-18
Payer: OTHER GOVERNMENT

## 2024-04-18 ENCOUNTER — TRANSCRIBE ORDERS (OUTPATIENT)
Dept: ULTRASOUND IMAGING | Facility: HOSPITAL | Age: 34
End: 2024-04-18
Payer: OTHER GOVERNMENT

## 2024-04-18 DIAGNOSIS — O35.8XX0 CYSTIC HYGROMA OF FETUS IN SINGLETON PREGNANCY: ICD-10-CM

## 2024-04-18 DIAGNOSIS — O10.919 CHRONIC HYPERTENSION AFFECTING PREGNANCY: Primary | ICD-10-CM

## 2024-04-18 DIAGNOSIS — O10.919 CHRONIC HYPERTENSION DURING PREGNANCY, ANTEPARTUM: Primary | ICD-10-CM

## 2024-04-18 LAB
ALBUMIN SERPL-MCNC: 3.9 G/DL (ref 3.5–5.2)
ALBUMIN/GLOB SERPL: 1.3 G/DL
ALP SERPL-CCNC: 76 U/L (ref 39–117)
ALT SERPL W P-5'-P-CCNC: 112 U/L (ref 1–33)
ANION GAP SERPL CALCULATED.3IONS-SCNC: 11.7 MMOL/L (ref 5–15)
AST SERPL-CCNC: 79 U/L (ref 1–32)
BILIRUB SERPL-MCNC: 0.2 MG/DL (ref 0–1.2)
BUN SERPL-MCNC: 10 MG/DL (ref 6–20)
BUN/CREAT SERPL: 21.3 (ref 7–25)
CALCIUM SPEC-SCNC: 8.8 MG/DL (ref 8.6–10.5)
CHLORIDE SERPL-SCNC: 104 MMOL/L (ref 98–107)
CO2 SERPL-SCNC: 24.3 MMOL/L (ref 22–29)
COLLECT DURATION TIME UR: 24 HRS
CREAT SERPL-MCNC: 0.47 MG/DL (ref 0.57–1)
EGFRCR SERPLBLD CKD-EPI 2021: 130.7 ML/MIN/1.73
GLOBULIN UR ELPH-MCNC: 2.9 GM/DL
GLUCOSE SERPL-MCNC: 99 MG/DL (ref 65–99)
POTASSIUM SERPL-SCNC: 3.7 MMOL/L (ref 3.5–5.2)
PROT 24H UR-MRATE: 126 MG/24HOURS (ref 0–150)
PROT SERPL-MCNC: 6.8 G/DL (ref 6–8.5)
SODIUM SERPL-SCNC: 140 MMOL/L (ref 136–145)
SPECIMEN VOL 24H UR: 3000 ML

## 2024-04-18 PROCEDURE — 80053 COMPREHEN METABOLIC PANEL: CPT

## 2024-04-18 PROCEDURE — 81050 URINALYSIS VOLUME MEASURE: CPT

## 2024-04-18 PROCEDURE — 84156 ASSAY OF PROTEIN URINE: CPT

## 2024-04-18 PROCEDURE — 36415 COLL VENOUS BLD VENIPUNCTURE: CPT

## 2024-04-24 ENCOUNTER — HOSPITAL ENCOUNTER (OUTPATIENT)
Dept: ULTRASOUND IMAGING | Facility: HOSPITAL | Age: 34
Discharge: HOME | End: 2024-04-24
Admitting: OBSTETRICS & GYNECOLOGY
Payer: OTHER GOVERNMENT

## 2024-04-24 ENCOUNTER — OFFICE VISIT (OUTPATIENT)
Dept: OBSTETRICS AND GYNECOLOGY | Facility: CLINIC | Age: 34
End: 2024-04-24
Payer: OTHER GOVERNMENT

## 2024-04-24 VITALS
SYSTOLIC BLOOD PRESSURE: 136 MMHG | DIASTOLIC BLOOD PRESSURE: 98 MMHG | TEMPERATURE: 98.4 F | WEIGHT: 237 LBS | BODY MASS INDEX: 37.2 KG/M2 | HEIGHT: 67 IN | HEART RATE: 96 BPM

## 2024-04-24 DIAGNOSIS — O35.8XX0 CYSTIC HYGROMA OF FETUS IN SINGLETON PREGNANCY: ICD-10-CM

## 2024-04-24 DIAGNOSIS — R74.01 TRANSAMINITIS: ICD-10-CM

## 2024-04-24 DIAGNOSIS — O10.919 CHRONIC HYPERTENSION AFFECTING PREGNANCY: ICD-10-CM

## 2024-04-24 DIAGNOSIS — O10.919 CHRONIC HYPERTENSION AFFECTING PREGNANCY: Primary | ICD-10-CM

## 2024-04-24 DIAGNOSIS — O99.282 HYPOTHYROIDISM AFFECTING PREGNANCY IN SECOND TRIMESTER: ICD-10-CM

## 2024-04-24 DIAGNOSIS — E03.9 HYPOTHYROIDISM AFFECTING PREGNANCY IN SECOND TRIMESTER: ICD-10-CM

## 2024-04-24 DIAGNOSIS — Z87.59 HISTORY OF PRE-ECLAMPSIA: ICD-10-CM

## 2024-04-24 PROBLEM — G47.26 CIRCADIAN RHYTHM SLEEP DISORDER, SHIFT WORK TYPE: Status: RESOLVED | Noted: 2017-08-17 | Resolved: 2024-04-24

## 2024-04-24 PROBLEM — O99.283 HYPOTHYROID IN PREGNANCY, ANTEPARTUM, THIRD TRIMESTER: Status: ACTIVE | Noted: 2018-05-07

## 2024-04-24 PROCEDURE — 76811 OB US DETAILED SNGL FETUS: CPT

## 2024-04-24 NOTE — PROGRESS NOTES
MATERNAL FETAL MEDICINE Consult Note    Dear Dr Felicia Boswell MD:    Thank you for your kind referral of Callie Parks.  As you know, she is a 33 y.o.   at  19 0/7 weeks gestation (Estimated Date of Delivery: 24). This is a consult.      Her antepartum course is complicated by:  Cystic hygroma, not found  Chronic hypertension, on medication   H/o preeclampsia in prior pregnancy  H/o CS  H/o postpartum hemorrhage  Hypothyroidism  Obesity  Transaminitis  Fatty Liver (Liver Ultrasound 2023)  Family h/o VSD (pts other children)    Aneuploidy Screening: low risk    HPI: Today, she denies headache, blurry vision, RUQ pain. No vaginal bleeding, no contractions.     Review of History:  Past Medical History:   Diagnosis Date    Circadian rhythm sleep disorder, shift work type 2017    Elevated liver enzymes     Gestational hypertension     Hypertension     CHTN     Past Surgical History:   Procedure Laterality Date    ADENOIDECTOMY       SECTION N/A 12/15/2018    Procedure:  SECTION PRIMARY;  Surgeon: Felicia Boswell MD;  Location: [facility] LABOR DELIVERY;  Service: Obstetrics/Gynecology    WISDOM TOOTH EXTRACTION       Social History     Socioeconomic History    Marital status:    Tobacco Use    Smoking status: Never    Smokeless tobacco: Never   Vaping Use    Vaping status: Never Used   Substance and Sexual Activity    Alcohol use: No    Drug use: No    Sexual activity: Yes     Partners: Male     Family History   Problem Relation Age of Onset    Deep vein thrombosis Mother 45    Pulmonary embolism Mother 45    Deep vein thrombosis Maternal Grandfather         30's    Breast cancer Neg Hx     Ovarian cancer Neg Hx     Uterine cancer Neg Hx     Colon cancer Neg Hx       Allergies   Allergen Reactions    Shrimp Nausea And Vomiting      Current Outpatient Medications on File Prior to Visit   Medication Sig Dispense Refill    aspirin 81 MG EC tablet Take 1 tablet by mouth Daily.    "   hydrOXYzine (ATARAX) 25 MG tablet Take 1 tablet by mouth 3 (Three) Times a Day As Needed for Itching. 60 tablet 6    NIFEdipine XL (PROCARDIA XL) 60 MG 24 hr tablet Take 1 tablet by mouth 2 (Two) Times a Day. 180 tablet 7    Prenatal Vit-Fe Fumarate-FA (prenatal vitamin 27-0.8) 27-0.8 MG tablet tablet Take  by mouth Daily.      Synthroid 50 MCG tablet TAKE 1 TABLET BY MOUTH DAILY 30 tablet 1    valACYclovir (Valtrex) 1000 MG tablet Take 2000mg BID x 1 day PRN symptoms 20 tablet 1    vitamin B-12 (CYANOCOBALAMIN) 1000 MCG tablet Take 1 tablet by mouth Daily.      VITAMIN D PO Take  by mouth.      vitamin E 1000 UNIT capsule Take 1 capsule by mouth Daily.      [DISCONTINUED] NIFEdipine XL (PROCARDIA XL) 60 MG 24 hr tablet Take 1 tablet by mouth Daily. 90 tablet 3     No current facility-administered medications on file prior to visit.        Past obstetric, gynecological, medical, surgical, family and social history reviewed.  Relevant lab work and imaging reviewed.    Review of systems  Constitutional:  denies fever, chills, malaise.   ENT/Mouth:  denies sore throat, tinnitus  Eyes: denies vision changes/pain  CV:  denies chest pain  Respiratory:  denies cough/SOB  GI:  denies N/V, diarrhea, abdominal pain.    :   denies dysuria  Skin:  denies lesions or pruritus   Neuro:  denies weakness, focal neurologic symptoms    Vitals:    04/24/24 0900 04/24/24 1044 04/24/24 1204 04/24/24 1206   BP: (!) 165/101 (!) 158/111 138/98 136/98   BP Location: Right arm Right arm Right arm Left arm   Patient Position: Sitting Sitting Sitting Sitting   Pulse: 96      Temp: 98.4 °F (36.9 °C)      TempSrc: Temporal      Weight: 108 kg (237 lb)      Height: 170.2 cm (67\")          PHYSICAL EXAM   GENERAL: Not in acute distress, AAOx3, pleasant  CARDIO: regular rate and rhythm  PULM: symmetric chest rise, speaking in complete sentences without difficulty  NEURO: awake, alert and oriented to person, place, and time  ABDOMINAL: No fundal " tenderness, no rebound or guarding, gravid  EXTREMITIES: no bilateral lower extremity edema/tenderness  SKIN: Warm, well-perfused      ULTRASOUND   Please view full ultrasound note on Imaging tab in ViewPoint.  Breech Presentation  Posterior placenta  MVP 3.8 cm, which is normal   g (AC 38%)  Visualized anatomy appears WNL, sub-optimal Neck, 4 chamber, RVOT/LVOT, DA, AA, SVC/IVC, and Spine  Cervical length > 3.5 cm, which is normal    ASSESSMENT/COUNSELIN y.o.   at  19 0/7 weeks gestation (Estimated Date of Delivery: 24).     -Pregnancy  [ X ] stable  [   ] improving [  ] worsening    Diagnoses and all orders for this visit:    1. Chronic hypertension affecting pregnancy (Primary)  Overview:  On procardia  Baseline labs  On ASA  Repeat CMP and 24 hour urine in first trimester      2. Transaminitis  Overview:  AST/ALT: 45/81 at new OB    Orders:  -     Hepatitis Panel, Acute; Future  -     CBC & Differential; Future  -     Comprehensive Metabolic Panel; Future    3. Hypothyroidism affecting pregnancy in second trimester  -     TSH+Free T4; Future      Cystic Hygroma, not found:  A cystic hygroma is a congenital thin-walled cyst that contains lymphatic fluid. It can be septated or non-septated and is commonly located in the soft tissue at the posterior neck region, but may extend cephalad to engulf fetal head or caudad to dorsum of fetus.  I do see what is being referenced in previous imaging, but it is from around 10 weeks where it is difficult to assess what is normal with regards to this posterior neck area.  Today, we do not see evidence of this, which is reassuring.  Discussed associations with aneuploidy (low risk NIPT) and heart defects making up the majority of cases, although the list is massive for what can cause a cystic hygroma.  We will see her again for anatomy and do detailed anatomy.  Depending on this, we will have a low threshold for ECHO/further workup.  I am cautiously  optimistic today not seeing a cystic hygroma.      CHTN  On procardia 60 mg XR BID  Risks of chronic hypertension include intrauterine growth restriction, increased risk of preeclampsia, increased risk of premature delivery, and increased risk for placental abruption.  I reassured her that with mild hypertension, no underlying cardiac disease, and normal renal function, most women do well in pregnancy.    I explained that acceptable blood pressures in pregnancies with chronic hypertension and without renal damage are 120-140/70-90s. Newer data from the [facility] (2022, CHAP TRIAL), supports more aggressive bp treatment to below 140/90 (previously ,160 in CHTN) and that this does not impair fetal growth or well-being but reduces risks of pre-eclampsia,  birth, and other pregnancy morbidity.  I discussed this with the patient.      I recommended serial growth ultrasounds every 4 weeks  to ensure appropriate fetal growth. In addition,  fetal testing 1-2x weekly should be initiated around 32 weeks gestation.  I would recommend a 38 week delivery given CHTN on meds.   She may require closer to 37 depending on her control closer to delivery.      Discussed importance of ambulatory monitoring at different times during the day.  Pt brought a bp log today and it was reviewed. Blood pressures at home are 120-130s/70-80s.     Elevated Liver Enzymes  This is not a new finding for this patient. She had a RUQ ultrasound done 2023.   Today I ordered additional labs, CBC, CMP, & Hep Panel  Will continue to monitor        Summary of Plan  - Continue aspirin 81 mg daily  - Serial growth ultrasounds every 4 weeks after anatomy (we will do next one to complete anatomy)   - Fetal Echo scheduled 24  - Continue routine prenatal care with primary OB  - Monitor thyroid labs once per trimester and as needed with medication changes  - Ordered CBC, CMP & Hep panel today  -Starting at 32 weeks: Weekly fetal   surveillance until delivery by primary OB  -Delivery 38 weeks for chronic hypertension on meds, unless indicated sooner    Follow-up: 4 weeks for growth / complete anatomy    Thank you for the consult and opportunity to care for this patient.  Please feel free to reach out with any questions or concerns.      I spent 30 minutes caring for this patient on this date of service. This time includes time spent by me in the following activities: preparing for the visit, reviewing tests, obtaining and/or reviewing a separately obtained history, performing a medically appropriate examination and/or evaluation, counseling and educating the patient/family/caregiver and independently interpreting results and communicating that information with the patient/family/caregiver with greater than 50% spent in counseling and coordination of care.     KERA López  Maternal Fetal Medicine-[facility]  Office: [phone]  [email or web address]

## 2024-04-24 NOTE — PROGRESS NOTES
Pt reports that she is doing well and denies vaginal bleeding, cramping, contractions or LOF at this time. Reports that she feels flutters from baby. Reviewed when to call OB office or present to L&D for evaluation with symptoms such as decreased fetal movement, vaginal bleeding, LOF or ctxs. Pt verbalized understanding. Denies HA, visual changes or epigastric pain. Denies any additional complaints at time of appointment. Next OB appointment scheduled for 05/08/2024    Vitals:    04/24/24 0900   BP: (!) 165/101   Pulse: 96   Temp: 98.4 °F (36.9 °C)

## 2024-04-24 NOTE — LETTER
2024     Felicia Boswell MD  [address]  [address]  [place] [id number]    Patient: Callie Parks   YOB: 1990   Date of Visit: 2024       Dear Felicia Boswell MD,    Thank you for referring Callie Parks to me for evaluation. Below is a copy of my consult note.    If you have questions, please do not hesitate to call me. I look forward to following Callie along with you.         Sincerely,        KERA Sellers        CC: No Recipients                          Pt reports that she is doing well and denies vaginal bleeding, cramping, contractions or LOF at this time. Reports that she feels flutters from baby. Reviewed when to call OB office or present to L&D for evaluation with symptoms such as decreased fetal movement, vaginal bleeding, LOF or ctxs. Pt verbalized understanding. Denies HA, visual changes or epigastric pain. Denies any additional complaints at time of appointment. Next OB appointment scheduled for 2024    Vitals:    24 0900   BP: (!) 165/101   Pulse: 96   Temp: 98.4 °F (36.9 °C)          MATERNAL FETAL MEDICINE Consult Note    Dear Dr Felicia Boswell MD:    Thank you for your kind referral of Callie Parks.  As you know, she is a 33 y.o.   at  19 0/7 weeks gestation (Estimated Date of Delivery: 24). This is a consult.      Her antepartum course is complicated by:  Cystic hygroma, not found  Chronic hypertension, on medication   H/o preeclampsia in prior pregnancy  H/o CS  H/o postpartum hemorrhage  Hypothyroidism  Obesity  Transaminitis  Fatty Liver (Liver Ultrasound 2023)  Family h/o VSD (pts other children)    Aneuploidy Screening: low risk    HPI: Today, she denies headache, blurry vision, RUQ pain. No vaginal bleeding, no contractions.     Review of History:  Past Medical History:   Diagnosis Date   • Circadian rhythm sleep disorder, shift work type 2017   • Elevated liver enzymes    • Gestational hypertension     • Hypertension     CHTN     Past Surgical History:   Procedure Laterality Date   • ADENOIDECTOMY     •  SECTION N/A 12/15/2018    Procedure:  SECTION PRIMARY;  Surgeon: Felicia Boswell MD;  Location: [facility] LABOR DELIVERY;  Service: Obstetrics/Gynecology   • WISDOM TOOTH EXTRACTION       Social History     Socioeconomic History   • Marital status:    Tobacco Use   • Smoking status: Never   • Smokeless tobacco: Never   Vaping Use   • Vaping status: Never Used   Substance and Sexual Activity   • Alcohol use: No   • Drug use: No   • Sexual activity: Yes     Partners: Male     Family History   Problem Relation Age of Onset   • Deep vein thrombosis Mother 45   • Pulmonary embolism Mother 45   • Deep vein thrombosis Maternal Grandfather         30's   • Breast cancer Neg Hx    • Ovarian cancer Neg Hx    • Uterine cancer Neg Hx    • Colon cancer Neg Hx       Allergies   Allergen Reactions   • Shrimp Nausea And Vomiting      Current Outpatient Medications on File Prior to Visit   Medication Sig Dispense Refill   • aspirin 81 MG EC tablet Take 1 tablet by mouth Daily.     • hydrOXYzine (ATARAX) 25 MG tablet Take 1 tablet by mouth 3 (Three) Times a Day As Needed for Itching. 60 tablet 6   • NIFEdipine XL (PROCARDIA XL) 60 MG 24 hr tablet Take 1 tablet by mouth 2 (Two) Times a Day. 180 tablet 7   • Prenatal Vit-Fe Fumarate-FA (prenatal vitamin 27-0.8) 27-0.8 MG tablet tablet Take  by mouth Daily.     • Synthroid 50 MCG tablet TAKE 1 TABLET BY MOUTH DAILY 30 tablet 1   • valACYclovir (Valtrex) 1000 MG tablet Take 2000mg BID x 1 day PRN symptoms 20 tablet 1   • vitamin B-12 (CYANOCOBALAMIN) 1000 MCG tablet Take 1 tablet by mouth Daily.     • VITAMIN D PO Take  by mouth.     • vitamin E 1000 UNIT capsule Take 1 capsule by mouth Daily.     • [DISCONTINUED] NIFEdipine XL (PROCARDIA XL) 60 MG 24 hr tablet Take 1 tablet by mouth Daily. 90 tablet 3     No current facility-administered medications on file  "prior to visit.        Past obstetric, gynecological, medical, surgical, family and social history reviewed.  Relevant lab work and imaging reviewed.    Review of systems  Constitutional:  denies fever, chills, malaise.   ENT/Mouth:  denies sore throat, tinnitus  Eyes: denies vision changes/pain  CV:  denies chest pain  Respiratory:  denies cough/SOB  GI:  denies N/V, diarrhea, abdominal pain.    :   denies dysuria  Skin:  denies lesions or pruritus   Neuro:  denies weakness, focal neurologic symptoms    Vitals:    24 0900 24 1044 24 1204 24 1206   BP: (!) 165/101 (!) 158/111 138/98 136/98   BP Location: Right arm Right arm Right arm Left arm   Patient Position: Sitting Sitting Sitting Sitting   Pulse: 96      Temp: 98.4 °F (36.9 °C)      TempSrc: Temporal      Weight: 108 kg (237 lb)      Height: 170.2 cm (67\")          PHYSICAL EXAM   GENERAL: Not in acute distress, AAOx3, pleasant  CARDIO: regular rate and rhythm  PULM: symmetric chest rise, speaking in complete sentences without difficulty  NEURO: awake, alert and oriented to person, place, and time  ABDOMINAL: No fundal tenderness, no rebound or guarding, gravid  EXTREMITIES: no bilateral lower extremity edema/tenderness  SKIN: Warm, well-perfused      ULTRASOUND   Please view full ultrasound note on Imaging tab in ViewPoint.  Breech Presentation  Posterior placenta  MVP 3.8 cm, which is normal   g (AC 38%)  Visualized anatomy appears WNL, sub-optimal Neck, 4 chamber, RVOT/LVOT, DA, AA, SVC/IVC, and Spine  Cervical length > 3.5 cm, which is normal    ASSESSMENT/COUNSELIN y.o.   at  19 0/7 weeks gestation (Estimated Date of Delivery: 24).     -Pregnancy  [ X ] stable  [   ] improving [  ] worsening    Diagnoses and all orders for this visit:    1. Chronic hypertension affecting pregnancy (Primary)  Overview:  On procardia  Baseline labs  On ASA  Repeat CMP and 24 hour urine in first trimester      2. " Transaminitis  Overview:  AST/ALT: 45/81 at new OB    Orders:  -     Hepatitis Panel, Acute; Future  -     CBC & Differential; Future  -     Comprehensive Metabolic Panel; Future    3. Hypothyroidism affecting pregnancy in second trimester  -     TSH+Free T4; Future      Cystic Hygroma, not found:  A cystic hygroma is a congenital thin-walled cyst that contains lymphatic fluid. It can be septated or non-septated and is commonly located in the soft tissue at the posterior neck region, but may extend cephalad to engulf fetal head or caudad to dorsum of fetus.  I do see what is being referenced in previous imaging, but it is from around 10 weeks where it is difficult to assess what is normal with regards to this posterior neck area.  Today, we do not see evidence of this, which is reassuring.  Discussed associations with aneuploidy (low risk NIPT) and heart defects making up the majority of cases, although the list is massive for what can cause a cystic hygroma.  We will see her again for anatomy and do detailed anatomy.  Depending on this, we will have a low threshold for ECHO/further workup.  I am cautiously optimistic today not seeing a cystic hygroma.      CHTN  On procardia 60 mg XR BID  Risks of chronic hypertension include intrauterine growth restriction, increased risk of preeclampsia, increased risk of premature delivery, and increased risk for placental abruption.  I reassured her that with mild hypertension, no underlying cardiac disease, and normal renal function, most women do well in pregnancy.    I explained that acceptable blood pressures in pregnancies with chronic hypertension and without renal damage are 120-140/70-90s. Newer data from the [facility] (2022, CHAP TRIAL), supports more aggressive bp treatment to below 140/90 (previously ,160 in CHTN) and that this does not impair fetal growth or well-being but reduces risks of pre-eclampsia,  birth, and other pregnancy morbidity.  I discussed  this with the patient.      I recommended serial growth ultrasounds every 4 weeks  to ensure appropriate fetal growth. In addition,  fetal testing 1-2x weekly should be initiated around 32 weeks gestation.  I would recommend a 38 week delivery given CHTN on meds.   She may require closer to 37 depending on her control closer to delivery.      Discussed importance of ambulatory monitoring at different times during the day.  Pt brought a bp log today and it was reviewed. Blood pressures at home are 120-130s/70-80s.     Elevated Liver Enzymes  This is not a new finding for this patient. She had a RUQ ultrasound done 2023.   Today I ordered additional labs, CBC, CMP, & Hep Panel  Will continue to monitor        Summary of Plan  - Continue aspirin 81 mg daily  - Serial growth ultrasounds every 4 weeks after anatomy (we will do next one to complete anatomy)   - Fetal Echo scheduled 24  - Continue routine prenatal care with primary OB  - Monitor thyroid labs once per trimester and as needed with medication changes  - Ordered CBC, CMP & Hep panel today  -Starting at 32 weeks: Weekly fetal  surveillance until delivery by primary OB  -Delivery 38 weeks for chronic hypertension on meds, unless indicated sooner    Follow-up: 4 weeks for growth / complete anatomy    Thank you for the consult and opportunity to care for this patient.  Please feel free to reach out with any questions or concerns.      I spent 30 minutes caring for this patient on this date of service. This time includes time spent by me in the following activities: preparing for the visit, reviewing tests, obtaining and/or reviewing a separately obtained history, performing a medically appropriate examination and/or evaluation, counseling and educating the patient/family/caregiver and independently interpreting results and communicating that information with the patient/family/caregiver with greater than 50% spent in counseling and  coordination of care.     KERA López  Maternal Fetal Medicine-[facility]  Office: [phone]  [email or web address]

## 2024-04-30 ENCOUNTER — LAB (OUTPATIENT)
Dept: LAB | Facility: HOSPITAL | Age: 34
End: 2024-04-30
Payer: OTHER GOVERNMENT

## 2024-04-30 DIAGNOSIS — O99.282 HYPOTHYROIDISM AFFECTING PREGNANCY IN SECOND TRIMESTER: ICD-10-CM

## 2024-04-30 DIAGNOSIS — E03.9 HYPOTHYROIDISM AFFECTING PREGNANCY IN SECOND TRIMESTER: ICD-10-CM

## 2024-04-30 DIAGNOSIS — R74.01 TRANSAMINITIS: ICD-10-CM

## 2024-04-30 LAB
ALBUMIN SERPL-MCNC: 3.9 G/DL (ref 3.5–5.2)
ALBUMIN/GLOB SERPL: 1.4 G/DL
ALP SERPL-CCNC: 74 U/L (ref 39–117)
ALT SERPL W P-5'-P-CCNC: 79 U/L (ref 1–33)
ANION GAP SERPL CALCULATED.3IONS-SCNC: 11 MMOL/L (ref 5–15)
AST SERPL-CCNC: 46 U/L (ref 1–32)
BASOPHILS # BLD AUTO: 0.1 10*3/MM3 (ref 0–0.2)
BASOPHILS NFR BLD AUTO: 1 % (ref 0–1.5)
BILIRUB SERPL-MCNC: 0.3 MG/DL (ref 0–1.2)
BUN SERPL-MCNC: 8 MG/DL (ref 6–20)
BUN/CREAT SERPL: 16 (ref 7–25)
CALCIUM SPEC-SCNC: 8.8 MG/DL (ref 8.6–10.5)
CHLORIDE SERPL-SCNC: 105 MMOL/L (ref 98–107)
CO2 SERPL-SCNC: 23 MMOL/L (ref 22–29)
CREAT SERPL-MCNC: 0.5 MG/DL (ref 0.57–1)
DEPRECATED RDW RBC AUTO: 44.2 FL (ref 37–54)
EGFRCR SERPLBLD CKD-EPI 2021: 128.8 ML/MIN/1.73
EOSINOPHIL # BLD AUTO: 0.07 10*3/MM3 (ref 0–0.4)
EOSINOPHIL NFR BLD AUTO: 0.7 % (ref 0.3–6.2)
ERYTHROCYTE [DISTWIDTH] IN BLOOD BY AUTOMATED COUNT: 13.2 % (ref 12.3–15.4)
GLOBULIN UR ELPH-MCNC: 2.8 GM/DL
GLUCOSE SERPL-MCNC: 96 MG/DL (ref 65–99)
HAV IGM SERPL QL IA: NORMAL
HBV CORE IGM SERPL QL IA: NORMAL
HBV SURFACE AG SERPL QL IA: NORMAL
HCT VFR BLD AUTO: 38.8 % (ref 34–46.6)
HCV AB SER QL: NORMAL
HGB BLD-MCNC: 13 G/DL (ref 12–15.9)
IMM GRANULOCYTES # BLD AUTO: 0.05 10*3/MM3 (ref 0–0.05)
IMM GRANULOCYTES NFR BLD AUTO: 0.5 % (ref 0–0.5)
LYMPHOCYTES # BLD AUTO: 2.16 10*3/MM3 (ref 0.7–3.1)
LYMPHOCYTES NFR BLD AUTO: 21.9 % (ref 19.6–45.3)
MCH RBC QN AUTO: 30.8 PG (ref 26.6–33)
MCHC RBC AUTO-ENTMCNC: 33.5 G/DL (ref 31.5–35.7)
MCV RBC AUTO: 91.9 FL (ref 79–97)
MONOCYTES # BLD AUTO: 0.71 10*3/MM3 (ref 0.1–0.9)
MONOCYTES NFR BLD AUTO: 7.2 % (ref 5–12)
NEUTROPHILS NFR BLD AUTO: 6.76 10*3/MM3 (ref 1.7–7)
NEUTROPHILS NFR BLD AUTO: 68.7 % (ref 42.7–76)
NRBC BLD AUTO-RTO: 0 /100 WBC (ref 0–0.2)
PLATELET # BLD AUTO: 197 10*3/MM3 (ref 140–450)
PMV BLD AUTO: 11.8 FL (ref 6–12)
POTASSIUM SERPL-SCNC: 3.8 MMOL/L (ref 3.5–5.2)
PROT SERPL-MCNC: 6.7 G/DL (ref 6–8.5)
RBC # BLD AUTO: 4.22 10*6/MM3 (ref 3.77–5.28)
SODIUM SERPL-SCNC: 139 MMOL/L (ref 136–145)
T4 FREE SERPL-MCNC: 1.06 NG/DL (ref 0.93–1.7)
TSH SERPL DL<=0.05 MIU/L-ACNC: 3.41 UIU/ML (ref 0.27–4.2)
WBC NRBC COR # BLD AUTO: 9.85 10*3/MM3 (ref 3.4–10.8)

## 2024-04-30 PROCEDURE — 85025 COMPLETE CBC W/AUTO DIFF WBC: CPT

## 2024-04-30 PROCEDURE — 84443 ASSAY THYROID STIM HORMONE: CPT

## 2024-04-30 PROCEDURE — 36415 COLL VENOUS BLD VENIPUNCTURE: CPT

## 2024-04-30 PROCEDURE — 80053 COMPREHEN METABOLIC PANEL: CPT

## 2024-04-30 PROCEDURE — 84439 ASSAY OF FREE THYROXINE: CPT

## 2024-04-30 PROCEDURE — 80074 ACUTE HEPATITIS PANEL: CPT

## 2024-04-30 RX ORDER — LEVOTHYROXINE SODIUM 50 MCG
75 TABLET ORAL DAILY
Qty: 45 TABLET | Refills: 6 | Status: SHIPPED | OUTPATIENT
Start: 2024-04-30

## 2024-04-30 RX ORDER — LEVOTHYROXINE SODIUM 50 MCG
75 TABLET ORAL DAILY
Qty: 30 TABLET | Refills: 1 | Status: SHIPPED | OUTPATIENT
Start: 2024-04-30 | End: 2024-04-30 | Stop reason: SDUPTHER

## 2024-05-01 ENCOUNTER — TRANSCRIBE ORDERS (OUTPATIENT)
Dept: ULTRASOUND IMAGING | Facility: HOSPITAL | Age: 34
End: 2024-05-01
Payer: OTHER GOVERNMENT

## 2024-05-01 DIAGNOSIS — O10.919 CHRONIC HYPERTENSION AFFECTING PREGNANCY: ICD-10-CM

## 2024-05-01 DIAGNOSIS — O10.919 CHRONIC HYPERTENSION AFFECTING PREGNANCY: Primary | ICD-10-CM

## 2024-05-01 DIAGNOSIS — O35.8XX0 CYSTIC HYGROMA OF FETUS IN SINGLETON PREGNANCY: ICD-10-CM

## 2024-05-01 DIAGNOSIS — O35.8XX0 CYSTIC HYGROMA OF FETUS IN SINGLETON PREGNANCY: Primary | ICD-10-CM

## 2024-05-08 ENCOUNTER — ROUTINE PRENATAL (OUTPATIENT)
Dept: OBSTETRICS AND GYNECOLOGY | Facility: CLINIC | Age: 34
End: 2024-05-08
Payer: OTHER GOVERNMENT

## 2024-05-08 VITALS — DIASTOLIC BLOOD PRESSURE: 80 MMHG | BODY MASS INDEX: 36.81 KG/M2 | SYSTOLIC BLOOD PRESSURE: 128 MMHG | WEIGHT: 235 LBS

## 2024-05-08 DIAGNOSIS — R74.01 TRANSAMINITIS: ICD-10-CM

## 2024-05-08 DIAGNOSIS — O10.919 CHRONIC HYPERTENSION AFFECTING PREGNANCY: ICD-10-CM

## 2024-05-08 DIAGNOSIS — E03.9 HYPOTHYROIDISM AFFECTING PREGNANCY IN SECOND TRIMESTER: ICD-10-CM

## 2024-05-08 DIAGNOSIS — O99.282 HYPOTHYROIDISM AFFECTING PREGNANCY IN SECOND TRIMESTER: ICD-10-CM

## 2024-05-08 DIAGNOSIS — Z3A.21 21 WEEKS GESTATION OF PREGNANCY: Primary | ICD-10-CM

## 2024-05-08 LAB
GLUCOSE UR STRIP-MCNC: NEGATIVE MG/DL
PROT UR STRIP-MCNC: ABNORMAL MG/DL

## 2024-05-21 ENCOUNTER — HOSPITAL ENCOUNTER (OUTPATIENT)
Dept: ULTRASOUND IMAGING | Facility: HOSPITAL | Age: 34
Discharge: HOME | End: 2024-05-21
Admitting: OBSTETRICS & GYNECOLOGY
Payer: OTHER GOVERNMENT

## 2024-05-21 DIAGNOSIS — O10.919 CHRONIC HYPERTENSION AFFECTING PREGNANCY: ICD-10-CM

## 2024-05-21 DIAGNOSIS — O35.8XX0 CYSTIC HYGROMA OF FETUS IN SINGLETON PREGNANCY: ICD-10-CM

## 2024-05-21 PROCEDURE — 76827 ECHO EXAM OF FETAL HEART: CPT

## 2024-05-21 PROCEDURE — 76825 ECHO EXAM OF FETAL HEART: CPT

## 2024-05-21 PROCEDURE — 93325 DOPPLER ECHO COLOR FLOW MAPG: CPT

## 2024-05-21 NOTE — PROGRESS NOTES
Fetal Cardiology Outpatient Consult  Note    Patient Name:Callie Parks  :1990  Medical Record Number:[id number]  Date of Service:2024  Requesting Obstetrician: Dr. Cary Mahmood, [facility]  Primary Obstetrician: Dr. Felicia Boswell  Consult Location: [facility]    Reason for Visit:  Family History of congenital heart disease and cystic hygroma earlier in pregnancy.    History: I had the pleasure of seeing your patient, Callie Parks, today for a Fetal Cardiology Initial Consultation.  Fetal cardiology evaluation was requested due to  family history of VSDs in two of Callie's previous children and cystic hygroma earlier in pregnancy.      Callie is a 33 y.o. woman who presents today at 22 6/7 weeks gestation, with a given due date of 2024.  This pregnancy was conceived naturally. NIPT was performed and was low risk.      OB History          3    Para   2    Term   1       1    AB   0    Living   2         SAB   0    IAB   0    Ectopic   0    Molar   0    Multiple        Live Births   2                Past Medical History:  Past Medical History:   Diagnosis Date    Circadian rhythm sleep disorder, shift work type 2017    Elevated liver enzymes     Gestational hypertension     Hypertension     CHTN       Current Medications:    Current Outpatient Medications:     aspirin 81 MG EC tablet, Take 1 tablet by mouth Daily., Disp: , Rfl:     hydrOXYzine (ATARAX) 25 MG tablet, Take 1 tablet by mouth 3 (Three) Times a Day As Needed for Itching., Disp: 60 tablet, Rfl: 6    NIFEdipine XL (PROCARDIA XL) 60 MG 24 hr tablet, Take 1 tablet by mouth 2 (Two) Times a Day., Disp: 180 tablet, Rfl: 7    Prenatal Vit-Fe Fumarate-FA (prenatal vitamin 27-0.8) 27-0.8 MG tablet tablet, Take  by mouth Daily., Disp: , Rfl:     Synthroid 50 MCG tablet, Take 1.5 tablets by mouth Daily., Disp: 45 tablet, Rfl: 6    valACYclovir (Valtrex) 1000 MG tablet,  Take 2000mg BID x 1 day PRN symptoms, Disp: 20 tablet, Rfl: 1    vitamin B-12 (CYANOCOBALAMIN) 1000 MCG tablet, Take 1 tablet by mouth Daily., Disp: , Rfl:     VITAMIN D PO, Take  by mouth., Disp: , Rfl:     vitamin E 1000 UNIT capsule, Take 1 capsule by mouth Daily., Disp: , Rfl:     Family History:  Family History   Problem Relation Age of Onset    Deep vein thrombosis Mother 45    Pulmonary embolism Mother 45    Deep vein thrombosis Maternal Grandfather         30's    Breast cancer Neg Hx     Ovarian cancer Neg Hx     Uterine cancer Neg Hx     Colon cancer Neg Hx      There is a family history of Callie's 8 yo who had a heart murmur heard in the  nursery and was found to have a small VSD which was followed and spontaneously closed.  Did not cause any symptoms, and did not need medication or procedure or surgery to close it.  Callie's second child had pleural effusions during pregnancy, and also was found to have a small VSD, after birth the pleural effusions resolved spontaneously, and the VSD was followed and spontaneously closed and the baby was cleared as well.    Imaging: A complete 2-D, color, and spectral doppler fetal echocardiogram was performed.  A full report is available separately.  In summary, today's findings show the following:     - Normal fetal cardiac anatomy and function at 22 6/7 weeks gestation.  - No evidence of a fetal VSD seen, cine images obtained from multiple angles with 2D and color Doppler.     A complete, detailed fetal echocardiogram can identify most major heart defects.  However, there are known limitations to this examination including a limited ability to diagnose some small atrial or ventricular septal defects, minor valve abnormalities, persistent patency of the ductus arteriosus, coarctation of the aorta, and abnormalities in small structures such as coronary arteries and pulmonary veins.    Discussion:   Findings were explained to patient.  The echo display screens  in our examination room were used.  Questions were answered at length and patient expressed understanding.  I explained the normal fetal circulation, today's fetal echocardiogram findings, the expected course of pregnancy, the impact of today's results on the location and mode of delivery and the expected  course.     Impression: In summary, today's evaluation found the followin) Normal fetal cardiac anatomy and function, with no evidence of a fetal VSD seen.  2) 22 6/7 weeks gestation    Recommendations:   1. There is no evidence of a ductal dependent fetal cardiac lesion.  I do not anticipate the need for immediate initiation of prostaglandin therapy and pediatric cardiology evaluation after birth.  2. There is no fetal cardiac indication to delivery at a hospital which provides specialized pediatric cardiac care.  Callie is currently planning to delivery at [facility], which is appropriate from the standpoint of the fetal heart and circulation.  3. There is no increased fetal cardiac risk from a trial of labor and vaginal or cesearian delivery based on today's fetal cardiac findings.  4. Based on the results of today's examination, no further fetal echocardiograms are recommended during pregnancy.  5. Recommend routine  and well  after birth by a primary care provider, further evaluation by pediatric cardiology is recommended only as needed.  6. I would be happy to see Callie again during pregnancy for any changes, problems, or concerns that may arise.  Please do not hesitate to call with any questions you may have.    Thank you for allowing me to share with you in the care of your patient.    I personally spent 60 minutes of direct provider time for the visit today, including review of prior OB and MFM medical records, face-to-face discussion and review of fetal cardiac images in the examination room, and charting.     Cody Lopez MD  [facility]  [facility]  Pediatric Cardiology  [phone]    5/21/2024  12:02 EDT

## 2024-05-22 ENCOUNTER — HOSPITAL ENCOUNTER (OUTPATIENT)
Dept: ULTRASOUND IMAGING | Facility: HOSPITAL | Age: 34
Discharge: HOME | End: 2024-05-22
Admitting: OBSTETRICS & GYNECOLOGY
Payer: OTHER GOVERNMENT

## 2024-05-22 ENCOUNTER — OFFICE VISIT (OUTPATIENT)
Dept: OBSTETRICS AND GYNECOLOGY | Facility: CLINIC | Age: 34
End: 2024-05-22
Payer: OTHER GOVERNMENT

## 2024-05-22 VITALS
SYSTOLIC BLOOD PRESSURE: 142 MMHG | WEIGHT: 238 LBS | TEMPERATURE: 98.2 F | HEIGHT: 67 IN | DIASTOLIC BLOOD PRESSURE: 100 MMHG | BODY MASS INDEX: 37.35 KG/M2

## 2024-05-22 DIAGNOSIS — O35.8XX0 CYSTIC HYGROMA OF FETUS IN SINGLETON PREGNANCY: ICD-10-CM

## 2024-05-22 DIAGNOSIS — O10.919 CHRONIC HYPERTENSION AFFECTING PREGNANCY: ICD-10-CM

## 2024-05-22 DIAGNOSIS — O10.919 CHRONIC HYPERTENSION AFFECTING PREGNANCY: Primary | ICD-10-CM

## 2024-05-22 DIAGNOSIS — O99.282 HYPOTHYROIDISM AFFECTING PREGNANCY IN SECOND TRIMESTER: ICD-10-CM

## 2024-05-22 DIAGNOSIS — Z87.59 HISTORY OF PRE-ECLAMPSIA: ICD-10-CM

## 2024-05-22 DIAGNOSIS — R74.01 TRANSAMINITIS: ICD-10-CM

## 2024-05-22 DIAGNOSIS — E03.9 HYPOTHYROIDISM AFFECTING PREGNANCY IN SECOND TRIMESTER: ICD-10-CM

## 2024-05-22 PROCEDURE — 76816 OB US FOLLOW-UP PER FETUS: CPT

## 2024-05-22 NOTE — PROGRESS NOTES
Pt reports that she is doing well and denies vaginal bleeding, cramping, contractions or LOF at this time. Reports active fetal movement. Reviewed when to call OB office or present to L&D for evaluation with symptoms such as decreased fetal movement, vaginal bleeding, LOF or ctxs. Pt verbalized understanding. Denies HA, visual changes or epigastric pain. Denies any additional complaints at time of appointment. Next OB appointment scheduled for 06/05/2024    Vitals:    05/22/24 0936   BP: 140/100   Temp:

## 2024-05-22 NOTE — LETTER
May 22, 2024       No Recipients    Patient: Callie Parks   YOB: 1990   Date of Visit: 2024       Dear Felicia Boswell MD    Callie Parks was in my office today. Below is a copy of my note.    If you have questions, please do not hesitate to call me. I look forward to following Callie along with you.         Sincerely,        Cary Mahmood MD      MATERNAL FETAL MEDICINE Consult Note    Dear Dr Felicia Boswell MD:    Thank you for your kind referral of Callie Parks.  As you know, she is a 33 y.o.   at  23 0/7 weeks gestation (Estimated Date of Delivery: 24). This is a consult.      Her antepartum course is complicated by:  Cystic hygroma, not found  Chronic hypertension, on medication   H/o preeclampsia in prior pregnancy  H/o CS  H/o postpartum hemorrhage  Hypothyroidism  Obesity  Transaminitis  Fatty Liver (Liver Ultrasound 2023)  Family h/o VSD (pts other children)    Aneuploidy Screening: low risk    HPI: Today, she denies headache, blurry vision, RUQ pain. No vaginal bleeding, no contractions.     Review of History:  Past Medical History:   Diagnosis Date   • Circadian rhythm sleep disorder, shift work type 2017   • Elevated liver enzymes    • Gestational hypertension    • Hypertension     CHTN     Past Surgical History:   Procedure Laterality Date   • ADENOIDECTOMY     •  SECTION N/A 12/15/2018    Procedure:  SECTION PRIMARY;  Surgeon: Felicia Boswell MD;  Location: [facility] LABOR DELIVERY;  Service: Obstetrics/Gynecology   • WISDOM TOOTH EXTRACTION       Social History     Socioeconomic History   • Marital status:    Tobacco Use   • Smoking status: Never   • Smokeless tobacco: Never   Vaping Use   • Vaping status: Never Used   Substance and Sexual Activity   • Alcohol use: No   • Drug use: No   • Sexual activity: Yes     Partners: Male     Family History   Problem Relation Age of Onset   • Deep vein thrombosis Mother 45   •  "Pulmonary embolism Mother 45   • Deep vein thrombosis Maternal Grandfather         30's   • Breast cancer Neg Hx    • Ovarian cancer Neg Hx    • Uterine cancer Neg Hx    • Colon cancer Neg Hx       Allergies   Allergen Reactions   • Shrimp Nausea And Vomiting      Current Outpatient Medications on File Prior to Visit   Medication Sig Dispense Refill   • aspirin 81 MG EC tablet Take 1 tablet by mouth Daily.     • hydrOXYzine (ATARAX) 25 MG tablet Take 1 tablet by mouth 3 (Three) Times a Day As Needed for Itching. 60 tablet 6   • NIFEdipine XL (PROCARDIA XL) 60 MG 24 hr tablet Take 1 tablet by mouth 2 (Two) Times a Day. 180 tablet 7   • Prenatal Vit-Fe Fumarate-FA (prenatal vitamin 27-0.8) 27-0.8 MG tablet tablet Take  by mouth Daily.     • Synthroid 50 MCG tablet Take 1.5 tablets by mouth Daily. 45 tablet 6   • valACYclovir (Valtrex) 1000 MG tablet Take 2000mg BID x 1 day PRN symptoms 20 tablet 1   • vitamin B-12 (CYANOCOBALAMIN) 1000 MCG tablet Take 1 tablet by mouth Daily.     • VITAMIN D PO Take  by mouth.     • vitamin E 1000 UNIT capsule Take 1 capsule by mouth Daily.       No current facility-administered medications on file prior to visit.        Past obstetric, gynecological, medical, surgical, family and social history reviewed.  Relevant lab work and imaging reviewed.    Review of systems  Constitutional:  denies fever, chills, malaise.   ENT/Mouth:  denies sore throat, tinnitus  Eyes: denies vision changes/pain  CV:  denies chest pain  Respiratory:  denies cough/SOB  GI:  denies N/V, diarrhea, abdominal pain.    :   denies dysuria  Skin:  denies lesions or pruritus   Neuro:  denies weakness, focal neurologic symptoms    Vitals:    05/22/24 0931 05/22/24 0936 05/22/24 1022   BP: (!) 144/106 140/100 142/100   BP Location: Left arm Right arm    Patient Position: Sitting Sitting    Temp: 98.2 °F (36.8 °C)     TempSrc: Temporal     Weight: 108 kg (238 lb)     Height: 170.2 cm (67\")           PHYSICAL EXAM "   GENERAL: Not in acute distress, AAOx3, pleasant  CARDIO: regular rate and rhythm  PULM: symmetric chest rise, speaking in complete sentences without difficulty  NEURO: awake, alert and oriented to person, place, and time  ABDOMINAL: No fundal tenderness, no rebound or guarding, gravid  EXTREMITIES: no bilateral lower extremity edema/tenderness  SKIN: Warm, well-perfused      ULTRASOUND   Please view full ultrasound note on Imaging tab in ViewPoint.  Breech presentation.  Posterior placenta.  MVP 4.5 cm, which is normal.    g (58%, AC 85%)  Follow up anatomy appears normal.    Transabdominal CL >3.5 cm, which is normal.      ASSESSMENT/COUNSELIN y.o.   at  23 0/7 weeks gestation (Estimated Date of Delivery: 24).     -Pregnancy  [ X ] stable  [   ] improving [  ] worsening    Diagnoses and all orders for this visit:    1. Chronic hypertension affecting pregnancy (Primary)  Overview:  On procardia  Baseline labs  On ASA  Repeat CMP and 24 hour urine in first trimester      2. Hypothyroidism affecting pregnancy in second trimester    3. History of pre-eclampsia    4. Transaminitis  Overview:  AST/ALT: 45/81 at new OB          Cystic Hygroma, not found:  Today, we do not see evidence of this again, which is reassuring.  Discussed associations with aneuploidy (low risk NIPT) and heart defects making up the majority of cases, although the list is massive for what can cause a cystic hygroma.  Detailed anatomy has been normal.      CHTN  On procardia 60 mg XR BID  Previously counseled.  Acceptable blood pressures in pregnancies with chronic hypertension and without renal damage are 120-140/70-90s. Newer data from the [facility] (2022, CHAP TRIAL), supports more aggressive bp treatment to below 140/90 (previously ,160 in CHTN) and that this does not impair fetal growth or well-being but reduces risks of pre-eclampsia,  birth, and other pregnancy morbidity.  I discussed this with the patient.       I recommended serial growth ultrasounds every 4 weeks  to ensure appropriate fetal growth. In addition,  fetal testing 1-2x weekly should be initiated around 32 weeks gestation.  I would recommend a 38 week delivery given CHTN on meds.   She may require closer to 37 depending on her control closer to delivery.      Discussed importance of ambulatory monitoring at different times during the day.  Pt brought a bp log today and it was reviewed. Blood pressures at home are 120-140's/70-low 90's.  She denies symptoms.  She will send us some BP's this PM.  We reviewed preE precautions in detail.  She is reliable and understands.  She will see Dr. Boswell for serial growths then weekly visits at 32 weeks.  She will need at least monthly PIH labs.  We re happy to see back if at any time Dr. Boswell or pt feels we need to.      Elevated Liver Enzymes  This is not a new finding for this patient. She had a RUQ ultrasound done 2023.   PIH labs last visit wnl.  Liver enzymes stable.   Recommend PIH labs at least monthly at primary OB to monitor.      Summary of Plan  - Continue aspirin 81 mg daily  - Serial growth ultrasounds every 4 weeks after anatomy (we will do next one and follow up BP)  - Fetal Echo scheduled 24  - Continue routine prenatal care with primary OB  - Monitor thyroid labs once per trimester and as needed with medication changes  - Recommend PIH labs at least monthly at primary OB to monitor.    -Starting at 32 weeks: Weekly fetal  surveillance until delivery by primary OB  -Delivery 38 weeks for chronic hypertension on meds, unless indicated sooner--may need 37 pending control.      Follow-up: No follow up with MFM scheduled--happy to see at request of patient or primary OB    Thank you for the consult and opportunity to care for this patient.  Please feel free to reach out with any questions or concerns.      I spent 17 minutes caring for this patient on this date of  service. This time includes time spent by me in the following activities: preparing for the visit, reviewing tests, obtaining and/or reviewing a separately obtained history, performing a medically appropriate examination and/or evaluation, counseling and educating the patient/family/caregiver and independently interpreting results and communicating that information with the patient/family/caregiver with greater than 50% spent in counseling and coordination of care.     4 minutes reading US.     Cary Mahmood MD FACOG  Maternal Fetal Medicine-[facility]  Office: [phone]  [email or web address]

## 2024-05-22 NOTE — PROGRESS NOTES
MATERNAL FETAL MEDICINE Consult Note    Dear Dr Felicia Boswell MD:    Thank you for your kind referral of Callie Parks.  As you know, she is a 33 y.o.   at  23 0/7 weeks gestation (Estimated Date of Delivery: 24). This is a consult.      Her antepartum course is complicated by:  Cystic hygroma, not found  Chronic hypertension, on medication   H/o preeclampsia in prior pregnancy  H/o CS  H/o postpartum hemorrhage  Hypothyroidism  Obesity  Transaminitis  Fatty Liver (Liver Ultrasound 2023)  Family h/o VSD (pts other children)    Aneuploidy Screening: low risk    HPI: Today, she denies headache, blurry vision, RUQ pain. No vaginal bleeding, no contractions.     Review of History:  Past Medical History:   Diagnosis Date    Circadian rhythm sleep disorder, shift work type 2017    Elevated liver enzymes     Gestational hypertension     Hypertension     CHTN     Past Surgical History:   Procedure Laterality Date    ADENOIDECTOMY       SECTION N/A 12/15/2018    Procedure:  SECTION PRIMARY;  Surgeon: Felicia Boswell MD;  Location: [facility] LABOR DELIVERY;  Service: Obstetrics/Gynecology    WISDOM TOOTH EXTRACTION       Social History     Socioeconomic History    Marital status:    Tobacco Use    Smoking status: Never    Smokeless tobacco: Never   Vaping Use    Vaping status: Never Used   Substance and Sexual Activity    Alcohol use: No    Drug use: No    Sexual activity: Yes     Partners: Male     Family History   Problem Relation Age of Onset    Deep vein thrombosis Mother 45    Pulmonary embolism Mother 45    Deep vein thrombosis Maternal Grandfather         30's    Breast cancer Neg Hx     Ovarian cancer Neg Hx     Uterine cancer Neg Hx     Colon cancer Neg Hx       Allergies   Allergen Reactions    Shrimp Nausea And Vomiting      Current Outpatient Medications on File Prior to Visit   Medication Sig Dispense Refill    aspirin 81 MG EC tablet Take 1 tablet by mouth  "Daily.      hydrOXYzine (ATARAX) 25 MG tablet Take 1 tablet by mouth 3 (Three) Times a Day As Needed for Itching. 60 tablet 6    NIFEdipine XL (PROCARDIA XL) 60 MG 24 hr tablet Take 1 tablet by mouth 2 (Two) Times a Day. 180 tablet 7    Prenatal Vit-Fe Fumarate-FA (prenatal vitamin 27-0.8) 27-0.8 MG tablet tablet Take  by mouth Daily.      Synthroid 50 MCG tablet Take 1.5 tablets by mouth Daily. 45 tablet 6    valACYclovir (Valtrex) 1000 MG tablet Take 2000mg BID x 1 day PRN symptoms 20 tablet 1    vitamin B-12 (CYANOCOBALAMIN) 1000 MCG tablet Take 1 tablet by mouth Daily.      VITAMIN D PO Take  by mouth.      vitamin E 1000 UNIT capsule Take 1 capsule by mouth Daily.       No current facility-administered medications on file prior to visit.        Past obstetric, gynecological, medical, surgical, family and social history reviewed.  Relevant lab work and imaging reviewed.    Review of systems  Constitutional:  denies fever, chills, malaise.   ENT/Mouth:  denies sore throat, tinnitus  Eyes: denies vision changes/pain  CV:  denies chest pain  Respiratory:  denies cough/SOB  GI:  denies N/V, diarrhea, abdominal pain.    :   denies dysuria  Skin:  denies lesions or pruritus   Neuro:  denies weakness, focal neurologic symptoms    Vitals:    05/22/24 0931 05/22/24 0936 05/22/24 1022   BP: (!) 144/106 140/100 142/100   BP Location: Left arm Right arm    Patient Position: Sitting Sitting    Temp: 98.2 °F (36.8 °C)     TempSrc: Temporal     Weight: 108 kg (238 lb)     Height: 170.2 cm (67\")           PHYSICAL EXAM   GENERAL: Not in acute distress, AAOx3, pleasant  CARDIO: regular rate and rhythm  PULM: symmetric chest rise, speaking in complete sentences without difficulty  NEURO: awake, alert and oriented to person, place, and time  ABDOMINAL: No fundal tenderness, no rebound or guarding, gravid  EXTREMITIES: no bilateral lower extremity edema/tenderness  SKIN: Warm, well-perfused      ULTRASOUND   Please view full " ultrasound note on Imaging tab in ViewPoint.  Breech presentation.  Posterior placenta.  MVP 4.5 cm, which is normal.    g (58%, AC 85%)  Follow up anatomy appears normal.    Transabdominal CL >3.5 cm, which is normal.      ASSESSMENT/COUNSELIN y.o.   at  23 0/7 weeks gestation (Estimated Date of Delivery: 24).     -Pregnancy  [ X ] stable  [   ] improving [  ] worsening    Diagnoses and all orders for this visit:    1. Chronic hypertension affecting pregnancy (Primary)  Overview:  On procardia  Baseline labs  On ASA  Repeat CMP and 24 hour urine in first trimester      2. Hypothyroidism affecting pregnancy in second trimester    3. History of pre-eclampsia    4. Transaminitis  Overview:  AST/ALT: 45/81 at new OB          Cystic Hygroma, not found:  Today, we do not see evidence of this again, which is reassuring.  Discussed associations with aneuploidy (low risk NIPT) and heart defects making up the majority of cases, although the list is massive for what can cause a cystic hygroma.  Detailed anatomy has been normal.      CHTN  On procardia 60 mg XR BID  Previously counseled.  Acceptable blood pressures in pregnancies with chronic hypertension and without renal damage are 120-140/70-90s. Newer data from the [facility] (2022, CHAP TRIAL), supports more aggressive bp treatment to below 140/90 (previously ,160 in CHTN) and that this does not impair fetal growth or well-being but reduces risks of pre-eclampsia,  birth, and other pregnancy morbidity.  I discussed this with the patient.      I recommended serial growth ultrasounds every 4 weeks  to ensure appropriate fetal growth. In addition,  fetal testing 1-2x weekly should be initiated around 32 weeks gestation.  I would recommend a 38 week delivery given CHTN on meds.   She may require closer to 37 depending on her control closer to delivery.      Discussed importance of ambulatory monitoring at different times during the  day.  Pt brought a bp log today and it was reviewed. Blood pressures at home are 120-140's/70-low 90's.  She denies symptoms.  She will send us some BP's this PM.  We reviewed preE precautions in detail.  She is reliable and understands.  She will see Dr. Boswell for serial growths then weekly visits at 32 weeks.  She will need at least monthly PIH labs.  We re happy to see back if at any time Dr. Boswell or pt feels we need to.      Elevated Liver Enzymes  This is not a new finding for this patient. She had a RUQ ultrasound done 2023.   PIH labs last visit wnl.  Liver enzymes stable.   Recommend PIH labs at least monthly at primary OB to monitor.      Summary of Plan  - Continue aspirin 81 mg daily  - Serial growth ultrasounds every 4 weeks after anatomy (we will do next one and follow up BP)  - Fetal Echo scheduled 24  - Continue routine prenatal care with primary OB  - Monitor thyroid labs once per trimester and as needed with medication changes  - Recommend PIH labs at least monthly at primary OB to monitor.    -Starting at 32 weeks: Weekly fetal  surveillance until delivery by primary OB  -Delivery 38 weeks for chronic hypertension on meds, unless indicated sooner--may need 37 pending control.      Follow-up: No follow up with MFM scheduled--happy to see at request of patient or primary OB    Thank you for the consult and opportunity to care for this patient.  Please feel free to reach out with any questions or concerns.      I spent 17 minutes caring for this patient on this date of service. This time includes time spent by me in the following activities: preparing for the visit, reviewing tests, obtaining and/or reviewing a separately obtained history, performing a medically appropriate examination and/or evaluation, counseling and educating the patient/family/caregiver and independently interpreting results and communicating that information with the patient/family/caregiver with greater  than 50% spent in counseling and coordination of care.     4 minutes reading US.     Cary Mahmood MD [facility] [place]  Maternal Fetal Medicine-[facility]  Office: [phone]  [email or web address]

## 2024-06-05 ENCOUNTER — ROUTINE PRENATAL (OUTPATIENT)
Dept: OBSTETRICS AND GYNECOLOGY | Facility: CLINIC | Age: 34
End: 2024-06-05
Payer: OTHER GOVERNMENT

## 2024-06-05 VITALS — WEIGHT: 241 LBS | DIASTOLIC BLOOD PRESSURE: 84 MMHG | SYSTOLIC BLOOD PRESSURE: 132 MMHG | BODY MASS INDEX: 37.75 KG/M2

## 2024-06-05 DIAGNOSIS — Z87.59 HISTORY OF PRE-ECLAMPSIA: ICD-10-CM

## 2024-06-05 DIAGNOSIS — O10.919 CHRONIC HYPERTENSION AFFECTING PREGNANCY: ICD-10-CM

## 2024-06-05 DIAGNOSIS — Z3A.25 25 WEEKS GESTATION OF PREGNANCY: Primary | ICD-10-CM

## 2024-06-05 LAB
GLUCOSE UR STRIP-MCNC: NEGATIVE MG/DL
PROT UR STRIP-MCNC: NEGATIVE MG/DL

## 2024-06-05 NOTE — PROGRESS NOTES
Chief Complaint   Patient presents with    Routine Prenatal Visit     25 weeks       Callie Parks is a 33 y.o.  at 25w0d  here for routine OB visit  Reports no major issues.  BP at home have been normal - one that was 140 systolic was highest    /84   Wt 109 kg (241 lb)   LMP 2023 (Approximate)   BMI 37.75 kg/m²    Fetal Heart Rate: 145  Movement: Present   Gen: NAD, well appearing  Abd: nontender    See OB Flowsheet    ASSESSMENT/PLAN:  Diagnoses and all orders for this visit:    1. 25 weeks gestation of pregnancy (Primary)  -     POC Urinalysis Dipstick  -     Comprehensive Metabolic Panel    2. Chronic hypertension affecting pregnancy  -     US Ob Follow Up Transabdominal Approach; Standing    3. History of pre-eclampsia      Continue asa, procardia.  BP are well controlled at home  PIH labs today with GCT  Routine counseling pertinent to this stage of pregnancy was reviewed including second trimester precautions  Return in about 4 weeks (around 7/3/2024) for growth US, OB visit.

## 2024-06-06 ENCOUNTER — TELEPHONE (OUTPATIENT)
Dept: OBSTETRICS AND GYNECOLOGY | Facility: CLINIC | Age: 34
End: 2024-06-06
Payer: OTHER GOVERNMENT

## 2024-06-06 DIAGNOSIS — O99.810 ABNORMAL GLUCOSE AFFECTING PREGNANCY: Primary | ICD-10-CM

## 2024-06-06 LAB
ALBUMIN SERPL-MCNC: 3.7 G/DL (ref 3.5–5.2)
ALBUMIN/GLOB SERPL: 1.2 G/DL
ALP SERPL-CCNC: 102 U/L (ref 39–117)
ALT SERPL-CCNC: 81 U/L (ref 1–33)
AST SERPL-CCNC: 48 U/L (ref 1–32)
BILIRUB SERPL-MCNC: <0.2 MG/DL (ref 0–1.2)
BUN SERPL-MCNC: 8 MG/DL (ref 6–20)
BUN/CREAT SERPL: 17 (ref 7–25)
CALCIUM SERPL-MCNC: 9 MG/DL (ref 8.6–10.5)
CHLORIDE SERPL-SCNC: 104 MMOL/L (ref 98–107)
CO2 SERPL-SCNC: 20.3 MMOL/L (ref 22–29)
CREAT SERPL-MCNC: 0.47 MG/DL (ref 0.57–1)
EGFRCR SERPLBLD CKD-EPI 2021: 130.7 ML/MIN/1.73
ERYTHROCYTE [DISTWIDTH] IN BLOOD BY AUTOMATED COUNT: 12.8 % (ref 12.3–15.4)
GLOBULIN SER CALC-MCNC: 3 GM/DL
GLUCOSE 1H P 50 G GLC PO SERPL-MCNC: 172 MG/DL (ref 65–139)
GLUCOSE SERPL-MCNC: 172 MG/DL (ref 65–99)
HCT VFR BLD AUTO: 41.6 % (ref 34–46.6)
HGB BLD-MCNC: 13.7 G/DL (ref 12–15.9)
MCH RBC QN AUTO: 30.4 PG (ref 26.6–33)
MCHC RBC AUTO-ENTMCNC: 32.9 G/DL (ref 31.5–35.7)
MCV RBC AUTO: 92.4 FL (ref 79–97)
PLATELET # BLD AUTO: 203 10*3/MM3 (ref 140–450)
POTASSIUM SERPL-SCNC: 3.7 MMOL/L (ref 3.5–5.2)
PROT SERPL-MCNC: 6.7 G/DL (ref 6–8.5)
RBC # BLD AUTO: 4.5 10*6/MM3 (ref 3.77–5.28)
SODIUM SERPL-SCNC: 137 MMOL/L (ref 136–145)
T PALLIDUM AB SER QL IF: NON REACTIVE
WBC # BLD AUTO: 12.66 10*3/MM3 (ref 3.4–10.8)

## 2024-06-06 RX ORDER — SYRING-NEEDL,DISP,INSUL,0.3 ML 30 GX5/16"
SYRINGE, EMPTY DISPOSABLE MISCELLANEOUS
Qty: 1 EACH | Refills: 12 | Status: SHIPPED | OUTPATIENT
Start: 2024-06-06

## 2024-06-06 RX ORDER — BLOOD-GLUCOSE METER
1 KIT MISCELLANEOUS AS NEEDED
Qty: 1 EACH | Refills: 0 | Status: SHIPPED | OUTPATIENT
Start: 2024-06-06

## 2024-06-06 NOTE — TELEPHONE ENCOUNTER
If she would prefer to pattern her blood sugars she can do fasting and 2 hours after each meal (four times daily) for 5 days instead of the 3 hour test.  Let me know if she prefers to do this and I can send a glucometer and supplies. Thanks!

## 2024-06-06 NOTE — TELEPHONE ENCOUNTER
Spoke to pt, informed pt and pt verbalized understanding. Pt states she prefers to check blood sugars four times daily for 5 days .

## 2024-06-06 NOTE — TELEPHONE ENCOUNTER
Please call patient and let her know that her one hour glucose testing (diabetes screen) was elevated.  She needs to complete a three hour glucose screening test as soon as possible.  For this test, she should come in fasting (nothing to eat or drink) for at least 8 hours.  We recommend doing it early in the morning.  The test will take three hours as we need to test a blood sugar each hour.    Your liver enzymes are overall stable from last check; platelets are normal.

## 2024-06-06 NOTE — TELEPHONE ENCOUNTER
Spoke to pt, informed pt and pt verbalized understanding. Pt states is it an option for her to be able to check it at home for the 3 hour glucose or does she have to come in for that? Pt states it's fine if she has to come in she just wanted to ask because that's what she did before for her other pregnancy.

## 2024-06-20 ENCOUNTER — TELEPHONE (OUTPATIENT)
Dept: OBSTETRICS AND GYNECOLOGY | Facility: CLINIC | Age: 34
End: 2024-06-20
Payer: OTHER GOVERNMENT

## 2024-06-20 NOTE — TELEPHONE ENCOUNTER
Carlota,     Sugars looking great.   Just keep upcoming appointment to review in office. No treatment at this time.      Thanks,   Dr. Melendez    ----- Message from Carlota ALMONTE sent at 2024  9:36 AM EDT -----  Regarding: BPs from this past week.  Contact: [phone]  Elbert pt. 27wk1d. OB & US 7/3/24. Glucose log. Thank you      ----- Message -----  From: Callie Parks  Sent: 2024   9:23 AM EDT  To: [facility]  Subject: BPs from this past week.                         Hey, I know you’re out of office the rest of the week. So, I will keep checking my sugars in the mean time. These were my sugars from the last week. Fasting is still borderline- but it has made a difference making sure I check my fasting at the 8 hour alejandro and walking most evenings. Let me know what you think/ would like me to do! Thanks!     Blood Sugars    24  Fastin  Meal 1: 87  Meal 2: 106  Meal 3: 86    6/15/24  Fastin  Meal 1: 92  Meal 2: 100  Meal 3: 119    24  Fastin  Meal 1: 78  Meal 2:  118  Meal 3: 94    24  Fastin  Meal 1: 66  Meal 2: 81  Meal 3: 76    24  Fastin  Meal 1: 76  Meal 2: 96  Meal 3: 117    24  Fastin  Meal 1: 100   Meal 2: 95   Meal 3: 106    23  Fastin  Meal 1: 90

## 2024-06-20 NOTE — TELEPHONE ENCOUNTER
Dr Melendez said that your sugars looking great.   Just keep upcoming appointment to review in office. No treatment at this time.    My Chart message sent.

## 2024-07-03 ENCOUNTER — ROUTINE PRENATAL (OUTPATIENT)
Dept: OBSTETRICS AND GYNECOLOGY | Facility: CLINIC | Age: 34
End: 2024-07-03
Payer: OTHER GOVERNMENT

## 2024-07-03 VITALS — SYSTOLIC BLOOD PRESSURE: 127 MMHG | WEIGHT: 244 LBS | BODY MASS INDEX: 38.22 KG/M2 | DIASTOLIC BLOOD PRESSURE: 84 MMHG

## 2024-07-03 DIAGNOSIS — O99.810 ABNORMAL GLUCOSE AFFECTING PREGNANCY: ICD-10-CM

## 2024-07-03 DIAGNOSIS — Z3A.29 29 WEEKS GESTATION OF PREGNANCY: Primary | ICD-10-CM

## 2024-07-03 DIAGNOSIS — O10.919 CHRONIC HYPERTENSION AFFECTING PREGNANCY: ICD-10-CM

## 2024-07-03 LAB
GLUCOSE UR STRIP-MCNC: NEGATIVE MG/DL
PROT UR STRIP-MCNC: NEGATIVE MG/DL

## 2024-07-03 NOTE — PROGRESS NOTES
Chief Complaint   Patient presents with    Routine Prenatal Visit     29 weeks      Callie Parks is a 33 y.o.  at 29w0d  here for routine OB visit  Reports BP have been doing well at home.   She has been taking blood sugars fasting and 4/7 were > 90. Her mealtime blood sugars are normal. The fasting is improved if she walks or has a timely dinner but this is difficult to do every day and she would like to start some evening insulin as she thinks this may help    /84   Wt 111 kg (244 lb)   LMP 2023 (Approximate)   BMI 38.22 kg/m²        Gen: NAD, well appearing  Abd: nontender    See OB Flowsheet    ASSESSMENT/PLAN:  Diagnoses and all orders for this visit:    1. 29 weeks gestation of pregnancy (Primary)  -     POC Urinalysis Dipstick    2. Abnormal glucose affecting pregnancy    3. Chronic hypertension affecting pregnancy    Other orders  -     Insulin Glargine (LANTUS SOLOSTAR) 100 UNIT/ML injection pen; Inject 5 Units under the skin into the appropriate area as directed Every Night.  Dispense: 3 mL; Refill: 1      Reviewed growth US  Will start lantus 5U QHS.  Check blood sugars four times daily and send next week.   Continue Procardia XL 60mg BID and blood pressure monitoring  Plan for BPP weekly with next visit  Routine counseling pertinent to this stage of pregnancy was reviewed including BP and DM precautions.    Return in about 2 weeks (around 2024) for OB visit and BPP (weekly BPP and OB visit after that).

## 2024-07-17 ENCOUNTER — ROUTINE PRENATAL (OUTPATIENT)
Dept: OBSTETRICS AND GYNECOLOGY | Facility: CLINIC | Age: 34
End: 2024-07-17
Payer: OTHER GOVERNMENT

## 2024-07-17 VITALS — BODY MASS INDEX: 37.84 KG/M2 | WEIGHT: 241.6 LBS | SYSTOLIC BLOOD PRESSURE: 134 MMHG | DIASTOLIC BLOOD PRESSURE: 85 MMHG

## 2024-07-17 DIAGNOSIS — O24.414 INSULIN CONTROLLED GESTATIONAL DIABETES MELLITUS (GDM) IN THIRD TRIMESTER: ICD-10-CM

## 2024-07-17 DIAGNOSIS — Z87.59 HISTORY OF PRE-ECLAMPSIA: ICD-10-CM

## 2024-07-17 DIAGNOSIS — O10.919 CHRONIC HYPERTENSION AFFECTING PREGNANCY: ICD-10-CM

## 2024-07-17 DIAGNOSIS — Z3A.31 31 WEEKS GESTATION OF PREGNANCY: Primary | ICD-10-CM

## 2024-07-17 LAB
GLUCOSE UR STRIP-MCNC: NEGATIVE MG/DL
PROT UR STRIP-MCNC: NEGATIVE MG/DL

## 2024-07-17 NOTE — PROGRESS NOTES
Chief Complaint   Patient presents with    Routine Prenatal Visit     31 weeks       Calile Parks is a 33 y.o.  at 31w0d  here for routine OB visit  Reports no major issues. Blood sugars are normal aside from fasting the day after she eats dinner late due to work. She increased Lantus to 8UQHS and this improved the values    /85   Wt 110 kg (241 lb 9.6 oz)   LMP 2023 (Approximate)   BMI 37.84 kg/m²        Gen: NAD, well appearing  Abd: nontender    See OB Flowsheet    ASSESSMENT/PLAN:  Diagnoses and all orders for this visit:    1. 31 weeks gestation of pregnancy (Primary)  -     POC Urinalysis Dipstick    2. Chronic hypertension affecting pregnancy    3. History of pre-eclampsia    4. Insulin controlled gestational diabetes mellitus (GDM) in third trimester      Continue Lantus 8U QHS  PIH labs next visit  BPP 8  Consider CS at 36+ weeks pending BP  Routine counseling pertinent to this stage of pregnancy was reviewed including third trimester precautions  Return in about 1 week (around 2024).

## 2024-07-24 ENCOUNTER — ROUTINE PRENATAL (OUTPATIENT)
Dept: OBSTETRICS AND GYNECOLOGY | Facility: CLINIC | Age: 34
End: 2024-07-24
Payer: OTHER GOVERNMENT

## 2024-07-24 VITALS — WEIGHT: 239.8 LBS | SYSTOLIC BLOOD PRESSURE: 133 MMHG | DIASTOLIC BLOOD PRESSURE: 86 MMHG | BODY MASS INDEX: 37.56 KG/M2

## 2024-07-24 DIAGNOSIS — O10.919 CHRONIC HYPERTENSION AFFECTING PREGNANCY: ICD-10-CM

## 2024-07-24 DIAGNOSIS — Z23 NEED FOR TDAP VACCINATION: ICD-10-CM

## 2024-07-24 DIAGNOSIS — Z3A.32 32 WEEKS GESTATION OF PREGNANCY: Primary | ICD-10-CM

## 2024-07-24 LAB
GLUCOSE UR STRIP-MCNC: NEGATIVE MG/DL
PROT UR STRIP-MCNC: NEGATIVE MG/DL

## 2024-07-24 NOTE — PROGRESS NOTES
Chief Complaint   Patient presents with    Routine Prenatal Visit     32 weeks       Callie Parks is a 33 y.o.  at 32w0d  here for routine OB visit  Reports no major issues. Home BP and home blood sugars have been well controlled    /86   Wt 109 kg (239 lb 12.8 oz)   LMP 2023 (Approximate)   BMI 37.56 kg/m²        Gen: NAD, well appearing  Abd: nontender    See OB Flowsheet    ASSESSMENT/PLAN:  Diagnoses and all orders for this visit:    1. 32 weeks gestation of pregnancy (Primary)  -     POC Urinalysis Dipstick  -     Varicella Zoster Antibody, IgG    2. Chronic hypertension affecting pregnancy  -     Comprehensive Metabolic Panel  -     CBC (No Diff)  -     Protein / Creatinine Ratio, Urine - Urine, Clean Catch  -     Uric Acid  -     Lactate Dehydrogenase  -     Varicella Zoster Antibody, IgG    3. Need for Tdap vaccination    Other orders  -     Tdap Vaccine => 8yo IM (BOOSTRIX/ADACEL)        Continue Lantus 8U QHS  BP at goal at this time  Discussed delivery planning - will consider 36-37 w as BP is labile at times.  Repeat PIH labs today  Tdap today    Return in about 1 week (around 2024) for BPP, OB visit.

## 2024-07-25 LAB
ALBUMIN SERPL-MCNC: 3.9 G/DL (ref 3.9–4.9)
ALP SERPL-CCNC: 135 IU/L (ref 44–121)
ALT SERPL-CCNC: 81 IU/L (ref 0–32)
AST SERPL-CCNC: 55 IU/L (ref 0–40)
BILIRUB SERPL-MCNC: 0.2 MG/DL (ref 0–1.2)
BUN SERPL-MCNC: 9 MG/DL (ref 6–20)
BUN/CREAT SERPL: 18 (ref 9–23)
CALCIUM SERPL-MCNC: 9 MG/DL (ref 8.7–10.2)
CHLORIDE SERPL-SCNC: 104 MMOL/L (ref 96–106)
CO2 SERPL-SCNC: 19 MMOL/L (ref 20–29)
CREAT SERPL-MCNC: 0.5 MG/DL (ref 0.57–1)
EGFRCR SERPLBLD CKD-EPI 2021: 129 ML/MIN/1.73
ERYTHROCYTE [DISTWIDTH] IN BLOOD BY AUTOMATED COUNT: 12.9 % (ref 11.7–15.4)
GLOBULIN SER CALC-MCNC: 2.5 G/DL (ref 1.5–4.5)
GLUCOSE SERPL-MCNC: 70 MG/DL (ref 70–99)
HCT VFR BLD AUTO: 40.4 % (ref 34–46.6)
HGB BLD-MCNC: 13.4 G/DL (ref 11.1–15.9)
LDH SERPL L TO P-CCNC: 222 IU/L (ref 119–226)
MCH RBC QN AUTO: 30.2 PG (ref 26.6–33)
MCHC RBC AUTO-ENTMCNC: 33.2 G/DL (ref 31.5–35.7)
MCV RBC AUTO: 91 FL (ref 79–97)
PLATELET # BLD AUTO: 185 X10E3/UL (ref 150–450)
POTASSIUM SERPL-SCNC: 4 MMOL/L (ref 3.5–5.2)
PROT SERPL-MCNC: 6.4 G/DL (ref 6–8.5)
RBC # BLD AUTO: 4.43 X10E6/UL (ref 3.77–5.28)
SODIUM SERPL-SCNC: 138 MMOL/L (ref 134–144)
URATE SERPL-MCNC: 5.7 MG/DL (ref 2.6–6.2)
VZV IGG SER IA-ACNC: 1856 INDEX
WBC # BLD AUTO: 11.1 X10E3/UL (ref 3.4–10.8)

## 2024-07-31 ENCOUNTER — ROUTINE PRENATAL (OUTPATIENT)
Dept: OBSTETRICS AND GYNECOLOGY | Facility: CLINIC | Age: 34
End: 2024-07-31
Payer: OTHER GOVERNMENT

## 2024-07-31 VITALS — DIASTOLIC BLOOD PRESSURE: 84 MMHG | SYSTOLIC BLOOD PRESSURE: 132 MMHG | WEIGHT: 241 LBS | BODY MASS INDEX: 37.75 KG/M2

## 2024-07-31 DIAGNOSIS — O24.414 INSULIN CONTROLLED GESTATIONAL DIABETES MELLITUS (GDM) IN THIRD TRIMESTER: ICD-10-CM

## 2024-07-31 DIAGNOSIS — O10.919 CHRONIC HYPERTENSION AFFECTING PREGNANCY: ICD-10-CM

## 2024-07-31 DIAGNOSIS — O36.63X0 EXCESSIVE FETAL GROWTH AFFECTING MANAGEMENT OF PREGNANCY IN THIRD TRIMESTER, SINGLE OR UNSPECIFIED FETUS: ICD-10-CM

## 2024-07-31 DIAGNOSIS — Z3A.33 33 WEEKS GESTATION OF PREGNANCY: Primary | ICD-10-CM

## 2024-07-31 LAB
GLUCOSE UR STRIP-MCNC: NEGATIVE MG/DL
PROT UR STRIP-MCNC: NEGATIVE MG/DL

## 2024-07-31 NOTE — PROGRESS NOTES
Chief Complaint   Patient presents with    Routine Prenatal Visit     33 weeks      Callie Parks is a 33 y.o.  at 33w0d  here for routine OB visit  Reports no major issues.  Her blood sugars and BP have been well controlled.     /84   Wt 109 kg (241 lb)   LMP 2023 (Approximate)   BMI 37.75 kg/m²        Gen: NAD, well appearing  Abd: nontender    See OB Flowsheet    ASSESSMENT/PLAN:  Diagnoses and all orders for this visit:    1. 33 weeks gestation of pregnancy (Primary)  -     POC Urinalysis Dipstick    2. Chronic hypertension affecting pregnancy    3. Insulin controlled gestational diabetes mellitus (GDM) in third trimester    4. Excessive fetal growth affecting management of pregnancy in third trimester, single or unspecified fetus    Reviewed US with LGA  Will consider delivery timing based on upcoming weeks  At this time continue insulin at night and continue BP monitoring  BPP weekly  Weekly BPP, OB visit

## 2024-08-07 ENCOUNTER — ROUTINE PRENATAL (OUTPATIENT)
Dept: OBSTETRICS AND GYNECOLOGY | Facility: CLINIC | Age: 34
End: 2024-08-07
Payer: OTHER GOVERNMENT

## 2024-08-07 VITALS — BODY MASS INDEX: 37.62 KG/M2 | WEIGHT: 240.2 LBS | SYSTOLIC BLOOD PRESSURE: 132 MMHG | DIASTOLIC BLOOD PRESSURE: 86 MMHG

## 2024-08-07 DIAGNOSIS — O24.414 INSULIN CONTROLLED GESTATIONAL DIABETES MELLITUS (GDM) IN THIRD TRIMESTER: ICD-10-CM

## 2024-08-07 DIAGNOSIS — Z3A.34 34 WEEKS GESTATION OF PREGNANCY: Primary | ICD-10-CM

## 2024-08-07 DIAGNOSIS — O36.63X0 EXCESSIVE FETAL GROWTH AFFECTING MANAGEMENT OF PREGNANCY IN THIRD TRIMESTER, SINGLE OR UNSPECIFIED FETUS: ICD-10-CM

## 2024-08-07 DIAGNOSIS — Z87.59 HISTORY OF PRE-ECLAMPSIA: ICD-10-CM

## 2024-08-07 LAB
GLUCOSE UR STRIP-MCNC: NEGATIVE MG/DL
PROT UR STRIP-MCNC: NEGATIVE MG/DL

## 2024-08-07 NOTE — PROGRESS NOTES
Chief Complaint   Patient presents with    Routine Prenatal Visit     34 weeks      Callie Parks is a 33 y.o.  at 34w0d  here for routine OB visit  Reports at home BP and blood sugars have been within range.  Occasionally has a BP of 140/90's after getting home from work.  Still on Lantus 8U QHS.      /86   Wt 109 kg (240 lb 3.2 oz)   LMP 2023 (Approximate)   BMI 37.62 kg/m²        Gen: NAD, well appearing  Abd: nontender    See OB Flowsheet    ASSESSMENT/PLAN:  Diagnoses and all orders for this visit:    1. 34 weeks gestation of pregnancy (Primary)  -     POC Urinalysis Dipstick    2. Insulin controlled gestational diabetes mellitus (GDM) in third trimester    3. Excessive fetal growth affecting management of pregnancy in third trimester, single or unspecified fetus    4. History of pre-eclampsia    BPP   Reviewed delivery planning and will continue to review as this is likely dependent on each visit's BP/blood sugars/fetal testing  Plan for repeat CS with tubal ligation  Continue lantus, continue blood pressure monitoring. Call if not in range  Reviewed common symptoms of the third trimester.  Counseled on labor precautions and anticipated fetal movements.  Reviewed kick counts.  Patient is aware of the location of L&D.     Return in about 1 week (around 2024) for BPP, OB visit and please sign tubal papers.

## 2024-08-14 ENCOUNTER — ROUTINE PRENATAL (OUTPATIENT)
Dept: OBSTETRICS AND GYNECOLOGY | Facility: CLINIC | Age: 34
End: 2024-08-14
Payer: OTHER GOVERNMENT

## 2024-08-14 VITALS — DIASTOLIC BLOOD PRESSURE: 84 MMHG | WEIGHT: 240.8 LBS | BODY MASS INDEX: 37.71 KG/M2 | SYSTOLIC BLOOD PRESSURE: 128 MMHG

## 2024-08-14 DIAGNOSIS — O24.414 INSULIN CONTROLLED GESTATIONAL DIABETES MELLITUS (GDM) IN THIRD TRIMESTER: ICD-10-CM

## 2024-08-14 DIAGNOSIS — Z3A.35 35 WEEKS GESTATION OF PREGNANCY: Primary | ICD-10-CM

## 2024-08-14 DIAGNOSIS — O10.919 CHRONIC HYPERTENSION AFFECTING PREGNANCY: ICD-10-CM

## 2024-08-14 LAB
GLUCOSE UR STRIP-MCNC: NEGATIVE MG/DL
PROT UR STRIP-MCNC: NEGATIVE MG/DL

## 2024-08-14 NOTE — PROGRESS NOTES
Chief Complaint   Patient presents with    Routine Prenatal Visit     35 weeks       Callie Parks is a 33 y.o.  at 35w0d  here for routine OB visit  Reports blood sugars well controlled- highest is 110's postprandial. BP also well controlled at home.    /84   Wt 109 kg (240 lb 12.8 oz)   LMP 2023 (Approximate)   BMI 37.71 kg/m²        Gen: NAD, well appearing  Abd: nontende  Pelvic: nl ext genitalia    See OB Flowsheet    ASSESSMENT/PLAN:  Diagnoses and all orders for this visit:    1. 35 weeks gestation of pregnancy (Primary)  -     POC Urinalysis Dipstick  -     Strep B Screen - Swab, Vaginal/Rectum    2. Insulin controlled gestational diabetes mellitus (GDM) in third trimester    3. Chronic hypertension affecting pregnancy    As of now, plan for CS around 38w unless earlier indication arises  Continue patterning blood sugar and trending BP  Continue Procardia 60mg XL BID and Lantus 8U QHS  Reviewed common symptoms of the third trimester.  Counseled on labor precautions and anticipated fetal movements.  Reviewed kick counts.  Patient is aware of the location of L&D.   Return in about 1 week (around 2024) for BPP, OB visit.

## 2024-08-16 LAB — GP B STREP DNA SPEC QL NAA+PROBE: NEGATIVE

## 2024-08-21 ENCOUNTER — ROUTINE PRENATAL (OUTPATIENT)
Dept: OBSTETRICS AND GYNECOLOGY | Facility: CLINIC | Age: 34
End: 2024-08-21
Payer: OTHER GOVERNMENT

## 2024-08-21 VITALS — SYSTOLIC BLOOD PRESSURE: 134 MMHG | BODY MASS INDEX: 37.53 KG/M2 | WEIGHT: 239.6 LBS | DIASTOLIC BLOOD PRESSURE: 86 MMHG

## 2024-08-21 DIAGNOSIS — O10.919 CHRONIC HYPERTENSION AFFECTING PREGNANCY: ICD-10-CM

## 2024-08-21 DIAGNOSIS — O24.414 INSULIN CONTROLLED GESTATIONAL DIABETES MELLITUS (GDM) IN THIRD TRIMESTER: ICD-10-CM

## 2024-08-21 DIAGNOSIS — Z3A.36 36 WEEKS GESTATION OF PREGNANCY: Primary | ICD-10-CM

## 2024-08-21 DIAGNOSIS — O36.63X0 EXCESSIVE FETAL GROWTH AFFECTING MANAGEMENT OF PREGNANCY IN THIRD TRIMESTER, SINGLE OR UNSPECIFIED FETUS: ICD-10-CM

## 2024-08-21 LAB
GLUCOSE UR STRIP-MCNC: NEGATIVE MG/DL
PROT UR STRIP-MCNC: NEGATIVE MG/DL

## 2024-08-21 RX ORDER — INSULIN GLARGINE 100 [IU]/ML
INJECTION, SOLUTION SUBCUTANEOUS
Qty: 3 ML | Refills: 1 | Status: SHIPPED | OUTPATIENT
Start: 2024-08-21

## 2024-08-21 NOTE — PROGRESS NOTES
Chief Complaint   Patient presents with    Routine Prenatal Visit     36 weeks       Callie Parks is a 33 y.o.  at 36w0d  here for routine OB visit  Reports no major issues- blood pressure is normal at home and blood sugars have been doing well. This morning fasting was 70's    /86   Wt 109 kg (239 lb 9.6 oz)   LMP 2023 (Approximate)   BMI 37.53 kg/m²        Gen: NAD, well appearing  Abd: nontender    See OB Flowsheet    ASSESSMENT/PLAN:  Diagnoses and all orders for this visit:    1. 36 weeks gestation of pregnancy (Primary)  -     Cancel: Group B Streptococcus Culture - Swab, Vaginal/Rectum  -     POC Urinalysis Dipstick    2. Chronic hypertension affecting pregnancy    3. Insulin controlled gestational diabetes mellitus (GDM) in third trimester    4. Excessive fetal growth affecting management of pregnancy in third trimester, single or unspecified fetus    BPP today   Plan is for repeat CS with bilateral salpingectomy at 37-38 weeks.   Reviewed common symptoms of the third trimester.  Counseled on labor precautions and anticipated fetal movements.  Reviewed kick counts.  Patient is aware of the location of L&D.     1 week OB visit with BPP

## 2024-08-28 ENCOUNTER — ANESTHESIA EVENT (OUTPATIENT)
Dept: LABOR AND DELIVERY | Facility: HOSPITAL | Age: 34
End: 2024-08-28
Payer: OTHER GOVERNMENT

## 2024-08-28 ENCOUNTER — ANESTHESIA (OUTPATIENT)
Dept: LABOR AND DELIVERY | Facility: HOSPITAL | Age: 34
End: 2024-08-28
Payer: OTHER GOVERNMENT

## 2024-08-28 ENCOUNTER — HOSPITAL ENCOUNTER (INPATIENT)
Facility: HOSPITAL | Age: 34
LOS: 2 days | Discharge: HOME | End: 2024-08-30
Attending: OBSTETRICS & GYNECOLOGY | Admitting: OBSTETRICS & GYNECOLOGY
Payer: OTHER GOVERNMENT

## 2024-08-28 ENCOUNTER — ROUTINE PRENATAL (OUTPATIENT)
Dept: OBSTETRICS AND GYNECOLOGY | Facility: CLINIC | Age: 34
End: 2024-08-28
Payer: OTHER GOVERNMENT

## 2024-08-28 VITALS
DIASTOLIC BLOOD PRESSURE: 101 MMHG | BODY MASS INDEX: 37.53 KG/M2 | WEIGHT: 239.6 LBS | SYSTOLIC BLOOD PRESSURE: 170 MMHG

## 2024-08-28 DIAGNOSIS — O24.414 INSULIN CONTROLLED GESTATIONAL DIABETES MELLITUS (GDM) IN THIRD TRIMESTER: ICD-10-CM

## 2024-08-28 DIAGNOSIS — Z3A.37 37 WEEKS GESTATION OF PREGNANCY: ICD-10-CM

## 2024-08-28 DIAGNOSIS — O10.919 CHRONIC HYPERTENSION AFFECTING PREGNANCY: Primary | ICD-10-CM

## 2024-08-28 DIAGNOSIS — Z98.891 PREVIOUS CESAREAN SECTION: ICD-10-CM

## 2024-08-28 DIAGNOSIS — Z30.2 REQUEST FOR STERILIZATION: ICD-10-CM

## 2024-08-28 DIAGNOSIS — Z13.89 SCREENING FOR BLOOD OR PROTEIN IN URINE: ICD-10-CM

## 2024-08-28 LAB
ABO GROUP BLD: NORMAL
ALBUMIN SERPL-MCNC: 3.7 G/DL (ref 3.5–5.2)
ALBUMIN/GLOB SERPL: 1.2 G/DL
ALP SERPL-CCNC: 203 U/L (ref 39–117)
ALT SERPL W P-5'-P-CCNC: 72 U/L (ref 1–33)
ANION GAP SERPL CALCULATED.3IONS-SCNC: 13.4 MMOL/L (ref 5–15)
AST SERPL-CCNC: 59 U/L (ref 1–32)
BASOPHILS # BLD AUTO: 0.12 10*3/MM3 (ref 0–0.2)
BASOPHILS NFR BLD AUTO: 1.1 % (ref 0–1.5)
BILIRUB SERPL-MCNC: 0.4 MG/DL (ref 0–1.2)
BLD GP AB SCN SERPL QL: NEGATIVE
BUN SERPL-MCNC: 10 MG/DL (ref 6–20)
BUN/CREAT SERPL: 17.5 (ref 7–25)
CALCIUM SPEC-SCNC: 9.6 MG/DL (ref 8.6–10.5)
CHLORIDE SERPL-SCNC: 104 MMOL/L (ref 98–107)
CO2 SERPL-SCNC: 17.6 MMOL/L (ref 22–29)
CREAT SERPL-MCNC: 0.57 MG/DL (ref 0.57–1)
CREAT UR-MCNC: 68.3 MG/DL
DEPRECATED RDW RBC AUTO: 39.7 FL (ref 37–54)
EGFRCR SERPLBLD CKD-EPI 2021: 124.8 ML/MIN/1.73
EOSINOPHIL # BLD AUTO: 0.04 10*3/MM3 (ref 0–0.4)
EOSINOPHIL NFR BLD AUTO: 0.4 % (ref 0.3–6.2)
ERYTHROCYTE [DISTWIDTH] IN BLOOD BY AUTOMATED COUNT: 12.6 % (ref 12.3–15.4)
GLOBULIN UR ELPH-MCNC: 3.2 GM/DL
GLUCOSE BLDC GLUCOMTR-MCNC: 78 MG/DL (ref 70–130)
GLUCOSE SERPL-MCNC: 76 MG/DL (ref 65–99)
GLUCOSE UR STRIP-MCNC: NEGATIVE MG/DL
HCT VFR BLD AUTO: 40.3 % (ref 34–46.6)
HGB BLD-MCNC: 13.8 G/DL (ref 12–15.9)
IMM GRANULOCYTES # BLD AUTO: 0.05 10*3/MM3 (ref 0–0.05)
IMM GRANULOCYTES NFR BLD AUTO: 0.5 % (ref 0–0.5)
LYMPHOCYTES # BLD AUTO: 2.3 10*3/MM3 (ref 0.7–3.1)
LYMPHOCYTES NFR BLD AUTO: 21.2 % (ref 19.6–45.3)
MCH RBC QN AUTO: 30.2 PG (ref 26.6–33)
MCHC RBC AUTO-ENTMCNC: 34.2 G/DL (ref 31.5–35.7)
MCV RBC AUTO: 88.2 FL (ref 79–97)
MONOCYTES # BLD AUTO: 0.76 10*3/MM3 (ref 0.1–0.9)
MONOCYTES NFR BLD AUTO: 7 % (ref 5–12)
NEUTROPHILS NFR BLD AUTO: 69.8 % (ref 42.7–76)
NEUTROPHILS NFR BLD AUTO: 7.59 10*3/MM3 (ref 1.7–7)
NRBC BLD AUTO-RTO: 0 /100 WBC (ref 0–0.2)
PLATELET # BLD AUTO: 174 10*3/MM3 (ref 140–450)
PMV BLD AUTO: 12.2 FL (ref 6–12)
POTASSIUM SERPL-SCNC: 3.8 MMOL/L (ref 3.5–5.2)
PROT ?TM UR-MCNC: 11.1 MG/DL
PROT SERPL-MCNC: 6.9 G/DL (ref 6–8.5)
PROT UR STRIP-MCNC: NEGATIVE MG/DL
PROT/CREAT UR: 162.5 MG/G CREA (ref 0–200)
RBC # BLD AUTO: 4.57 10*6/MM3 (ref 3.77–5.28)
RH BLD: POSITIVE
SODIUM SERPL-SCNC: 135 MMOL/L (ref 136–145)
T&S EXPIRATION DATE: NORMAL
TREPONEMA PALLIDUM IGG+IGM AB [PRESENCE] IN SERUM OR PLASMA BY IMMUNOASSAY: NORMAL
WBC NRBC COR # BLD AUTO: 10.86 10*3/MM3 (ref 3.4–10.8)

## 2024-08-28 PROCEDURE — 25010000002 FENTANYL CITRATE (PF) 50 MCG/ML SOLUTION: Performed by: ANESTHESIOLOGY

## 2024-08-28 PROCEDURE — 58611 LIGATE OVIDUCT(S) ADD-ON: CPT | Performed by: OBSTETRICS & GYNECOLOGY

## 2024-08-28 PROCEDURE — 25810000003 LACTATED RINGERS PER 1000 ML: Performed by: OBSTETRICS & GYNECOLOGY

## 2024-08-28 PROCEDURE — 59510 CESAREAN DELIVERY: CPT | Performed by: OBSTETRICS & GYNECOLOGY

## 2024-08-28 PROCEDURE — 86850 RBC ANTIBODY SCREEN: CPT | Performed by: OBSTETRICS & GYNECOLOGY

## 2024-08-28 PROCEDURE — 82570 ASSAY OF URINE CREATININE: CPT | Performed by: OBSTETRICS & GYNECOLOGY

## 2024-08-28 PROCEDURE — 25010000002 ONDANSETRON PER 1 MG: Performed by: STUDENT IN AN ORGANIZED HEALTH CARE EDUCATION/TRAINING PROGRAM

## 2024-08-28 PROCEDURE — 85025 COMPLETE CBC W/AUTO DIFF WBC: CPT | Performed by: OBSTETRICS & GYNECOLOGY

## 2024-08-28 PROCEDURE — 86780 TREPONEMA PALLIDUM: CPT | Performed by: OBSTETRICS & GYNECOLOGY

## 2024-08-28 PROCEDURE — 0UB70ZZ EXCISION OF BILATERAL FALLOPIAN TUBES, OPEN APPROACH: ICD-10-PCS | Performed by: OBSTETRICS & GYNECOLOGY

## 2024-08-28 PROCEDURE — 86901 BLOOD TYPING SEROLOGIC RH(D): CPT | Performed by: OBSTETRICS & GYNECOLOGY

## 2024-08-28 PROCEDURE — 25010000002 MORPHINE PER 10 MG: Performed by: ANESTHESIOLOGY

## 2024-08-28 PROCEDURE — 25010000002 PHENYLEPHRINE 10 MG/ML SOLUTION: Performed by: ANESTHESIOLOGY

## 2024-08-28 PROCEDURE — 86900 BLOOD TYPING SEROLOGIC ABO: CPT | Performed by: OBSTETRICS & GYNECOLOGY

## 2024-08-28 PROCEDURE — 25010000002 METHYLERGONOVINE MALEATE PER 0.2 MG: Performed by: OBSTETRICS & GYNECOLOGY

## 2024-08-28 PROCEDURE — 25010000002 DIPHENHYDRAMINE PER 50 MG: Performed by: ANESTHESIOLOGY

## 2024-08-28 PROCEDURE — 25010000002 BUPIVACAINE PF 0.75 % SOLUTION: Performed by: ANESTHESIOLOGY

## 2024-08-28 PROCEDURE — 88307 TISSUE EXAM BY PATHOLOGIST: CPT

## 2024-08-28 PROCEDURE — 80053 COMPREHEN METABOLIC PANEL: CPT | Performed by: OBSTETRICS & GYNECOLOGY

## 2024-08-28 PROCEDURE — 88302 TISSUE EXAM BY PATHOLOGIST: CPT | Performed by: OBSTETRICS & GYNECOLOGY

## 2024-08-28 PROCEDURE — 25010000002 CEFAZOLIN PER 500 MG: Performed by: OBSTETRICS & GYNECOLOGY

## 2024-08-28 PROCEDURE — 84156 ASSAY OF PROTEIN URINE: CPT | Performed by: OBSTETRICS & GYNECOLOGY

## 2024-08-28 PROCEDURE — 82948 REAGENT STRIP/BLOOD GLUCOSE: CPT

## 2024-08-28 RX ORDER — ONDANSETRON 2 MG/ML
4 INJECTION INTRAMUSCULAR; INTRAVENOUS ONCE AS NEEDED
Status: COMPLETED | OUTPATIENT
Start: 2024-08-28 | End: 2024-08-28

## 2024-08-28 RX ORDER — BUPIVACAINE HYDROCHLORIDE 7.5 MG/ML
INJECTION, SOLUTION EPIDURAL; RETROBULBAR
Status: COMPLETED | OUTPATIENT
Start: 2024-08-28 | End: 2024-08-28

## 2024-08-28 RX ORDER — SODIUM CHLORIDE 9 MG/ML
40 INJECTION, SOLUTION INTRAVENOUS AS NEEDED
Status: DISCONTINUED | OUTPATIENT
Start: 2024-08-28 | End: 2024-08-28 | Stop reason: HOSPADM

## 2024-08-28 RX ORDER — PHENYLEPHRINE HYDROCHLORIDE 10 MG/ML
INJECTION INTRAVENOUS AS NEEDED
Status: DISCONTINUED | OUTPATIENT
Start: 2024-08-28 | End: 2024-08-28 | Stop reason: SURG

## 2024-08-28 RX ORDER — FLUMAZENIL 0.1 MG/ML
0.2 INJECTION INTRAVENOUS AS NEEDED
Status: DISCONTINUED | OUTPATIENT
Start: 2024-08-28 | End: 2024-08-30 | Stop reason: HOSPADM

## 2024-08-28 RX ORDER — NIFEDIPINE 60 MG/1
60 TABLET, EXTENDED RELEASE ORAL
Status: DISCONTINUED | OUTPATIENT
Start: 2024-08-29 | End: 2024-08-29

## 2024-08-28 RX ORDER — EPHEDRINE SULFATE 50 MG/ML
INJECTION, SOLUTION INTRAVENOUS AS NEEDED
Status: DISCONTINUED | OUTPATIENT
Start: 2024-08-28 | End: 2024-08-28 | Stop reason: SURG

## 2024-08-28 RX ORDER — NITROGLYCERIN 20 MG/100ML
INJECTION INTRAVENOUS
Status: DISCONTINUED
Start: 2024-08-28 | End: 2024-08-28 | Stop reason: WASHOUT

## 2024-08-28 RX ORDER — FENTANYL CITRATE 50 UG/ML
INJECTION, SOLUTION INTRAMUSCULAR; INTRAVENOUS
Status: COMPLETED | OUTPATIENT
Start: 2024-08-28 | End: 2024-08-28

## 2024-08-28 RX ORDER — SODIUM CHLORIDE 0.9 % (FLUSH) 0.9 %
10 SYRINGE (ML) INJECTION EVERY 12 HOURS SCHEDULED
Status: DISCONTINUED | OUTPATIENT
Start: 2024-08-28 | End: 2024-08-28 | Stop reason: HOSPADM

## 2024-08-28 RX ORDER — OXYCODONE AND ACETAMINOPHEN 7.5; 325 MG/1; MG/1
1 TABLET ORAL EVERY 4 HOURS PRN
Status: DISCONTINUED | OUTPATIENT
Start: 2024-08-28 | End: 2024-08-30 | Stop reason: HOSPADM

## 2024-08-28 RX ORDER — EPHEDRINE SULFATE 50 MG/ML
5 INJECTION, SOLUTION INTRAVENOUS ONCE AS NEEDED
Status: DISCONTINUED | OUTPATIENT
Start: 2024-08-28 | End: 2024-08-30 | Stop reason: HOSPADM

## 2024-08-28 RX ORDER — MORPHINE SULFATE 4 MG/ML
INJECTION, SOLUTION INTRAMUSCULAR; INTRAVENOUS
Status: COMPLETED | OUTPATIENT
Start: 2024-08-28 | End: 2024-08-28

## 2024-08-28 RX ORDER — ERYTHROMYCIN 5 MG/G
OINTMENT OPHTHALMIC
Status: ACTIVE
Start: 2024-08-28 | End: 2024-08-29

## 2024-08-28 RX ORDER — LABETALOL HYDROCHLORIDE 5 MG/ML
5 INJECTION, SOLUTION INTRAVENOUS
Status: DISCONTINUED | OUTPATIENT
Start: 2024-08-28 | End: 2024-08-30 | Stop reason: HOSPADM

## 2024-08-28 RX ORDER — LIDOCAINE HYDROCHLORIDE 10 MG/ML
0.5 INJECTION, SOLUTION INFILTRATION; PERINEURAL ONCE AS NEEDED
Status: DISCONTINUED | OUTPATIENT
Start: 2024-08-28 | End: 2024-08-28 | Stop reason: HOSPADM

## 2024-08-28 RX ORDER — PROMETHAZINE HYDROCHLORIDE 25 MG/1
25 SUPPOSITORY RECTAL ONCE AS NEEDED
Status: DISCONTINUED | OUTPATIENT
Start: 2024-08-28 | End: 2024-08-30 | Stop reason: HOSPADM

## 2024-08-28 RX ORDER — OXYTOCIN/0.9 % SODIUM CHLORIDE 30/500 ML
250 PLASTIC BAG, INJECTION (ML) INTRAVENOUS CONTINUOUS
Status: DISPENSED | OUTPATIENT
Start: 2024-08-28 | End: 2024-08-28

## 2024-08-28 RX ORDER — NALOXONE HCL 0.4 MG/ML
0.2 VIAL (ML) INJECTION AS NEEDED
Status: DISCONTINUED | OUTPATIENT
Start: 2024-08-28 | End: 2024-08-30 | Stop reason: HOSPADM

## 2024-08-28 RX ORDER — DIPHENHYDRAMINE HYDROCHLORIDE 50 MG/ML
12.5 INJECTION INTRAMUSCULAR; INTRAVENOUS
Status: DISCONTINUED | OUTPATIENT
Start: 2024-08-28 | End: 2024-08-30 | Stop reason: HOSPADM

## 2024-08-28 RX ORDER — PROMETHAZINE HYDROCHLORIDE 25 MG/1
25 TABLET ORAL ONCE AS NEEDED
Status: DISCONTINUED | OUTPATIENT
Start: 2024-08-28 | End: 2024-08-30 | Stop reason: HOSPADM

## 2024-08-28 RX ORDER — FENTANYL CITRATE 50 UG/ML
50 INJECTION, SOLUTION INTRAMUSCULAR; INTRAVENOUS
Status: DISCONTINUED | OUTPATIENT
Start: 2024-08-28 | End: 2024-08-30 | Stop reason: HOSPADM

## 2024-08-28 RX ORDER — TRANEXAMIC ACID 10 MG/ML
1000 INJECTION, SOLUTION INTRAVENOUS ONCE AS NEEDED
Status: DISCONTINUED | OUTPATIENT
Start: 2024-08-28 | End: 2024-08-28 | Stop reason: HOSPADM

## 2024-08-28 RX ORDER — METHYLERGONOVINE MALEATE 0.2 MG/ML
200 INJECTION INTRAVENOUS ONCE AS NEEDED
Status: COMPLETED | OUTPATIENT
Start: 2024-08-28 | End: 2024-08-28

## 2024-08-28 RX ORDER — CARBOPROST TROMETHAMINE 250 UG/ML
250 INJECTION, SOLUTION INTRAMUSCULAR
Status: DISCONTINUED | OUTPATIENT
Start: 2024-08-28 | End: 2024-08-28 | Stop reason: HOSPADM

## 2024-08-28 RX ORDER — PHYTONADIONE 1 MG/.5ML
INJECTION, EMULSION INTRAMUSCULAR; INTRAVENOUS; SUBCUTANEOUS
Status: ACTIVE
Start: 2024-08-28 | End: 2024-08-29

## 2024-08-28 RX ORDER — HYDRALAZINE HYDROCHLORIDE 20 MG/ML
5 INJECTION INTRAMUSCULAR; INTRAVENOUS
Status: DISCONTINUED | OUTPATIENT
Start: 2024-08-28 | End: 2024-08-30 | Stop reason: HOSPADM

## 2024-08-28 RX ORDER — ONDANSETRON 2 MG/ML
4 INJECTION INTRAMUSCULAR; INTRAVENOUS ONCE AS NEEDED
Status: DISCONTINUED | OUTPATIENT
Start: 2024-08-28 | End: 2024-08-30 | Stop reason: HOSPADM

## 2024-08-28 RX ORDER — MISOPROSTOL 200 UG/1
800 TABLET ORAL ONCE AS NEEDED
Status: DISCONTINUED | OUTPATIENT
Start: 2024-08-28 | End: 2024-08-28 | Stop reason: HOSPADM

## 2024-08-28 RX ORDER — DIPHENHYDRAMINE HYDROCHLORIDE 50 MG/ML
INJECTION INTRAMUSCULAR; INTRAVENOUS AS NEEDED
Status: DISCONTINUED | OUTPATIENT
Start: 2024-08-28 | End: 2024-08-28 | Stop reason: SURG

## 2024-08-28 RX ORDER — DROPERIDOL 2.5 MG/ML
0.62 INJECTION, SOLUTION INTRAMUSCULAR; INTRAVENOUS
Status: DISCONTINUED | OUTPATIENT
Start: 2024-08-28 | End: 2024-08-30 | Stop reason: HOSPADM

## 2024-08-28 RX ORDER — OXYTOCIN/0.9 % SODIUM CHLORIDE 30/500 ML
125 PLASTIC BAG, INJECTION (ML) INTRAVENOUS ONCE AS NEEDED
Status: COMPLETED | OUTPATIENT
Start: 2024-08-28 | End: 2024-08-28

## 2024-08-28 RX ORDER — PHENYLEPHRINE HYDROCHLORIDE 10 MG/ML
INJECTION INTRAVENOUS AS NEEDED
Status: DISCONTINUED | OUTPATIENT
Start: 2024-08-28 | End: 2024-08-28

## 2024-08-28 RX ORDER — ACETAMINOPHEN 500 MG
1000 TABLET ORAL ONCE
Status: COMPLETED | OUTPATIENT
Start: 2024-08-28 | End: 2024-08-28

## 2024-08-28 RX ORDER — KETOROLAC TROMETHAMINE 30 MG/ML
30 INJECTION, SOLUTION INTRAMUSCULAR; INTRAVENOUS ONCE
Status: DISCONTINUED | OUTPATIENT
Start: 2024-08-28 | End: 2024-08-28 | Stop reason: HOSPADM

## 2024-08-28 RX ORDER — TRANEXAMIC ACID 100 MG/ML
INJECTION, SOLUTION INTRAVENOUS AS NEEDED
Status: DISCONTINUED | OUTPATIENT
Start: 2024-08-28 | End: 2024-08-28 | Stop reason: SURG

## 2024-08-28 RX ORDER — HYDROCODONE BITARTRATE AND ACETAMINOPHEN 7.5; 325 MG/1; MG/1
1 TABLET ORAL ONCE AS NEEDED
Status: DISCONTINUED | OUTPATIENT
Start: 2024-08-28 | End: 2024-08-30 | Stop reason: HOSPADM

## 2024-08-28 RX ORDER — SODIUM CHLORIDE, SODIUM LACTATE, POTASSIUM CHLORIDE, CALCIUM CHLORIDE 600; 310; 30; 20 MG/100ML; MG/100ML; MG/100ML; MG/100ML
125 INJECTION, SOLUTION INTRAVENOUS CONTINUOUS
Status: DISCONTINUED | OUTPATIENT
Start: 2024-08-28 | End: 2024-08-29

## 2024-08-28 RX ORDER — HYDROMORPHONE HYDROCHLORIDE 1 MG/ML
0.5 INJECTION, SOLUTION INTRAMUSCULAR; INTRAVENOUS; SUBCUTANEOUS
Status: DISCONTINUED | OUTPATIENT
Start: 2024-08-28 | End: 2024-08-30 | Stop reason: HOSPADM

## 2024-08-28 RX ORDER — OXYTOCIN/0.9 % SODIUM CHLORIDE 30/500 ML
999 PLASTIC BAG, INJECTION (ML) INTRAVENOUS ONCE
Status: DISCONTINUED | OUTPATIENT
Start: 2024-08-28 | End: 2024-08-28 | Stop reason: HOSPADM

## 2024-08-28 RX ORDER — SODIUM CHLORIDE 0.9 % (FLUSH) 0.9 %
10 SYRINGE (ML) INJECTION AS NEEDED
Status: DISCONTINUED | OUTPATIENT
Start: 2024-08-28 | End: 2024-08-28 | Stop reason: HOSPADM

## 2024-08-28 RX ORDER — DIPHENHYDRAMINE HCL 25 MG
25 CAPSULE ORAL
Status: DISCONTINUED | OUTPATIENT
Start: 2024-08-28 | End: 2024-08-30 | Stop reason: HOSPADM

## 2024-08-28 RX ORDER — FAMOTIDINE 10 MG/ML
20 INJECTION, SOLUTION INTRAVENOUS ONCE AS NEEDED
Status: COMPLETED | OUTPATIENT
Start: 2024-08-28 | End: 2024-08-28

## 2024-08-28 RX ORDER — DROPERIDOL 2.5 MG/ML
0.62 INJECTION, SOLUTION INTRAMUSCULAR; INTRAVENOUS ONCE AS NEEDED
Status: DISCONTINUED | OUTPATIENT
Start: 2024-08-28 | End: 2024-08-30 | Stop reason: HOSPADM

## 2024-08-28 RX ADMIN — BUPIVACAINE HYDROCHLORIDE 1.6 ML: 7.5 INJECTION, SOLUTION EPIDURAL; RETROBULBAR at 20:06

## 2024-08-28 RX ADMIN — EPHEDRINE SULFATE 10 MG: 50 INJECTION INTRAVENOUS at 20:13

## 2024-08-28 RX ADMIN — SODIUM CHLORIDE, POTASSIUM CHLORIDE, SODIUM LACTATE AND CALCIUM CHLORIDE: 600; 310; 30; 20 INJECTION, SOLUTION INTRAVENOUS at 19:55

## 2024-08-28 RX ADMIN — SODIUM CHLORIDE, POTASSIUM CHLORIDE, SODIUM LACTATE AND CALCIUM CHLORIDE 125 ML/HR: 600; 310; 30; 20 INJECTION, SOLUTION INTRAVENOUS at 17:08

## 2024-08-28 RX ADMIN — SODIUM CHLORIDE 2000 MG: 900 INJECTION INTRAVENOUS at 19:47

## 2024-08-28 RX ADMIN — EPHEDRINE SULFATE 10 MG: 50 INJECTION INTRAVENOUS at 20:10

## 2024-08-28 RX ADMIN — FAMOTIDINE 20 MG: 10 INJECTION INTRAVENOUS at 19:29

## 2024-08-28 RX ADMIN — Medication 125 ML/HR: at 22:30

## 2024-08-28 RX ADMIN — METHYLERGONOVINE MALEATE 200 MCG: 0.2 INJECTION, SOLUTION INTRAMUSCULAR; INTRAVENOUS at 20:35

## 2024-08-28 RX ADMIN — FENTANYL CITRATE 20 MCG: 50 INJECTION, SOLUTION INTRAMUSCULAR; INTRAVENOUS at 20:06

## 2024-08-28 RX ADMIN — ONDANSETRON 4 MG: 2 INJECTION, SOLUTION INTRAMUSCULAR; INTRAVENOUS at 19:28

## 2024-08-28 RX ADMIN — Medication 999 ML/HR: at 20:27

## 2024-08-28 RX ADMIN — PHENYLEPHRINE HYDROCHLORIDE 100 MCG: 10 INJECTION INTRAVENOUS at 20:20

## 2024-08-28 RX ADMIN — DIPHENHYDRAMINE HYDROCHLORIDE 25 MG: 50 INJECTION, SOLUTION INTRAMUSCULAR; INTRAVENOUS at 21:18

## 2024-08-28 RX ADMIN — SODIUM CHLORIDE, POTASSIUM CHLORIDE, SODIUM LACTATE AND CALCIUM CHLORIDE: 600; 310; 30; 20 INJECTION, SOLUTION INTRAVENOUS at 20:40

## 2024-08-28 RX ADMIN — PHENYLEPHRINE HYDROCHLORIDE 100 MCG: 10 INJECTION INTRAVENOUS at 20:58

## 2024-08-28 RX ADMIN — TRANEXAMIC ACID 1000 MG: 1 INJECTION, SOLUTION INTRAVENOUS at 20:32

## 2024-08-28 RX ADMIN — ACETAMINOPHEN 1000 MG: 500 TABLET ORAL at 19:26

## 2024-08-28 RX ADMIN — PHENYLEPHRINE HYDROCHLORIDE 100 MCG: 10 INJECTION INTRAVENOUS at 20:43

## 2024-08-28 RX ADMIN — MORPHINE SULFATE 150 MCG: 4 INJECTION, SOLUTION INTRAMUSCULAR; INTRAVENOUS at 20:06

## 2024-08-28 NOTE — PROGRESS NOTES
Chief Complaint   Patient presents with    Routine Prenatal Visit     HPI- Pt is 33 y.o.  at 37w0d here for prenatal visit.  Patient has no complaints today and reports her blood pressures at home have been normal.  She reports that they are always elevated in the office.  She is denying any headache, vision changes, or right upper quadrant pain.  She does report good fetal movement.    ROS-     - No vaginal bleeding    GI- No abdominal pain    BP (!) 170/101   Wt 109 kg (239 lb 9.6 oz)   LMP 2023 (Approximate)   BMI 37.53 kg/m²   Exam - See flow sheet    Fetal heart rate is normal    Assessment-  Diagnoses and all orders for this visit:    Chronic hypertension affecting pregnancy    Screening for blood or protein in urine  -     POC Urinalysis Dipstick    Insulin controlled gestational diabetes mellitus (GDM) in third trimester    37 weeks gestation of pregnancy    Breech presentation, single or unspecified fetus    Previous  section    Patient had 3 consecutive severe range blood pressures in our office and I discussed with patient that I would recommend proceeding with delivery.  She is 37 weeks, which is term and I do not feel that we we will gain anything by waiting until her scheduled  next week.  I discussed with her that I think the risk of continuing the pregnancy greatly outweigh any risk of delivery today at 37 weeks.  Labor and delivery and on-call physician have been notified and she will go to labor and delivery for blood pressures, labs, and repeat .

## 2024-08-28 NOTE — ANESTHESIA PREPROCEDURE EVALUATION
" Anesthesia Evaluation     Patient summary reviewed and Nursing notes reviewed   no history of anesthetic complications:   NPO Solid Status: > 8 hours  NPO Liquid Status: > 2 hours           Airway   Mallampati: II  TM distance: >3 FB  Neck ROM: full  Dental - normal exam     Pulmonary - normal exam   (-) COPD, asthma  Cardiovascular   Exercise tolerance: good (4-7 METS)    Rate: abnormal    (+) hypertension  (-) past MI      Neuro/Psych  (-) seizures, CVA  GI/Hepatic/Renal/Endo    (+) diabetes mellitus gestational using insulin, thyroid problem hypothyroidism  (-) GERD, liver disease, no renal disease    Musculoskeletal     Abdominal    Substance History - negative use     OB/GYN    (+) Pregnant, pregnancy induced hypertension        Other - negative ROS                     Anesthesia Plan    ASA 2     spinal     (Intrauterine pregnancy 37w0d    I have reviewed the patient's history and chart with the patient, including all pertinent laboratory results and imaging. I have explained the risks of anesthesia including but not limited to dental or airway injury, nausea, aspiration, cardio-pulmonary complications.     I have explained the risk of neuraxial technique including, PDPHA, bleeding, infection, or possible nerve injury. Patient agrees to proceed.     VITALS:  /95 (BP Location: Right arm, Patient Position: Sitting)   Pulse 97   Resp 16   Ht 170.2 cm (67\")   Wt 108 kg (239 lb)   LMP 12/01/2023 (Approximate)   Breastfeeding Yes   BMI 37.43 kg/m² )    Anesthetic plan, risks, benefits, and alternatives have been provided, discussed and informed consent has been obtained with: patient.  Pre-procedure education provided      CODE STATUS:    Level Of Support Discussed With: Patient  Code Status (Patient has no pulse and is not breathing): CPR (Attempt to Resuscitate)  Medical Interventions (Patient has pulse or is breathing): Full Support      "

## 2024-08-28 NOTE — H&P
H&P Note    Patient Identification:  Name: Callie Parks  Age: 33 y.o.  Sex: female  :  1990  MRN: [id number]                       Chief Complaint: Chronic hypertension with severe features    History of Present Illness:   33-year-old  3 para 2 presents at 37 and 07 weeks after being admitted from the office.  The patient has been followed for gestational hypertension, insulin requiring gestational diabetes and hypothyroidism.  Also, breech presentation.  She has a history of 2 prior  deliveries.  Today, she had 3 consecutive severe range pressures in the office.  In view of chronic hypertension now with severe range hypertension, it was recommended by Dr. Ojeda that the patient delivered today and I agree.  Because the patient has 2 prior  deliveries and breech presentation, repeat  delivery is planned.  Group B strep status is negative.  Fetal heart tones are reassuring.    Problem List:  [unfilled]  Past Medical History:  Past Medical History:   Diagnosis Date    Circadian rhythm sleep disorder, shift work type 2017    Elevated liver enzymes     Gestational diabetes     Gestational hypertension     Hypertension     CHTN    Hypothyroidism      Past Surgical History:  Past Surgical History:   Procedure Laterality Date    ADENOIDECTOMY       SECTION N/A 12/15/2018    Procedure:  SECTION PRIMARY;  Surgeon: Felicia Boswell MD;  Location: [facility] LABOR DELIVERY;  Service: Obstetrics/Gynecology    WISDOM TOOTH EXTRACTION        Home Meds:  Medications Prior to Admission   Medication Sig Dispense Refill Last Dose    aspirin 81 MG EC tablet Take 1 tablet by mouth Daily.   2024 at 0800    hydrOXYzine (ATARAX) 25 MG tablet Take 1 tablet by mouth 3 (Three) Times a Day As Needed for Itching. 60 tablet 6 Past Month    Lantus SoloStar 100 UNIT/ML injection pen INJECT 5 UNITS INTO APPROPRIATE AREA AS DIRECTED EVERY NIGHT 3 mL 1 2024 at 2100     NIFEdipine XL (PROCARDIA XL) 60 MG 24 hr tablet Take 1 tablet by mouth 2 (Two) Times a Day. 180 tablet 7 8/28/2024 at 0800    Prenatal Vit-Fe Fumarate-FA (prenatal vitamin 27-0.8) 27-0.8 MG tablet tablet Take  by mouth Daily.   8/28/2024    Synthroid 50 MCG tablet Take 1.5 tablets by mouth Daily. 45 tablet 6 8/28/2024    vitamin B-12 (CYANOCOBALAMIN) 1000 MCG tablet Take 1 tablet by mouth Daily.   8/28/2024    VITAMIN D PO Take  by mouth.   8/28/2024    vitamin E 1000 UNIT capsule Take 1 capsule by mouth Daily.   8/28/2024    glucose blood test strip Take blood sugar fasting (AM) and 2 hours after start of breakfast, lunch and dinner 100 each 12     glucose monitor monitoring kit Use 1 each As Needed (take fasting (AM) and 2 hours after start of each meal). Take fasting and 2 hours after start of breakfast, lunch, dinner 1 each 0     Lancet Device misc Take blood sugar fasting and 2 hours after breakfast, lunch, dinner 1 each 12     valACYclovir (Valtrex) 1000 MG tablet Take 2000mg BID x 1 day PRN symptoms 20 tablet 1 More than a month     Current Meds:   [unfilled]  Allergies:  Allergies   Allergen Reactions    Shrimp Nausea And Vomiting     Immunizations:  Immunization History   Administered Date(s) Administered    COVID-19 ([facility]) 09/12/2021    Fluzone (or Fluarix & Flulaval for VFC) >6mos 01/01/2016, 10/12/2020    Influenza Seasonal Injectable 10/30/2017, 10/31/2018, 10/12/2021    MMR 12/19/2018    Tdap 10/26/2018, 03/01/2021, 07/24/2024     Social History:   Social History     Tobacco Use    Smoking status: Never    Smokeless tobacco: Never   Substance Use Topics    Alcohol use: No      Family History:  Family History   Problem Relation Age of Onset    Deep vein thrombosis Mother 45    Pulmonary embolism Mother 45    Deep vein thrombosis Maternal Grandfather         30's    Breast cancer Neg Hx     Ovarian cancer Neg Hx     Uterine cancer Neg Hx     Colon cancer Neg Hx         Review of Systems  This is negative  for headaches or vision changes.  It is negative for upper abdominal pain.  Negative for fever or chills.  Negative for nausea or vomiting.  All other systems are reviewed and are negative.    Objective:  tMax 24 hrs: No data recorded.    Vitals Ranges:   Heart Rate:  [] 104  Resp:  [16] 16  BP: (147-170)/() 154/91  Intake and Output Last 3 Shifts:   No intake/output data recorded.    Exam:     General Appearance:    Alert, cooperative, no distress, appears stated age   Head:    Normocephalic, without obvious abnormality, atraumatic   Back:     Symmetric, no curvature, ROM normal, no CVA tenderness   Lungs:     Clear to auscultation bilaterally, respirations unlabored   Chest Wall:    No tenderness or deformity    Heart:    Regular rate and rhythm, S1 and S2 normal, no murmur, rub   or gallop       Abdomen:   Soft, gravid.  There is no epigastric tenderness.  No right upper quadrant tenderness.  No fundal tenderness.  No suprapubic tenderness.       Rectal:  Not examined today   Extremities: Equal in size with 1+ bipedal edema   Skin:   Skin color, texture, turgor normal, no rashes or lesions   Lymph nodes:   Cervical, supraclavicular, and axillary nodes normal       Data Review:  NST is reactive  Lab Results (last 24 hours)       Procedure Component Value Units Date/Time    Protein / Creatinine Ratio, Urine - Urine, Clean Catch [[id number]] Collected: 08/28/24 1701    Specimen: Urine, Clean Catch Updated: 08/28/24 1750     Protein/Creatinine Ratio, Urine 162.5 mg/G Crea      Creatinine, Urine 68.3 mg/dL      Total Protein, Urine 11.1 mg/dL     Treponema pallidum AB w/Reflex RPR [[id number]]  (Normal) Collected: 08/28/24 1701    Specimen: Blood Updated: 08/28/24 1746     Treponemal AB Total Non-Reactive    Narrative:      Reactive results will reflex RPR testing.    Comprehensive Metabolic Panel [[id number]]  (Abnormal) Collected: 08/28/24 1701    Specimen: Blood Updated: 08/28/24 1741     Glucose 76 mg/dL       BUN 10 mg/dL      Creatinine 0.57 mg/dL      Sodium 135 mmol/L      Potassium 3.8 mmol/L      Chloride 104 mmol/L      CO2 17.6 mmol/L      Calcium 9.6 mg/dL      Total Protein 6.9 g/dL      Albumin 3.7 g/dL      ALT (SGPT) 72 U/L      AST (SGOT) 59 U/L      Alkaline Phosphatase 203 U/L      Total Bilirubin 0.4 mg/dL      Globulin 3.2 gm/dL      A/G Ratio 1.2 g/dL      BUN/Creatinine Ratio 17.5     Anion Gap 13.4 mmol/L      eGFR 124.8 mL/min/1.73     Narrative:      GFR Normal >60  Chronic Kidney Disease <60  Kidney Failure <15      CBC & Differential [[id number]]  (Abnormal) Collected: 08/28/24 1701    Specimen: Blood Updated: 08/28/24 1723    Narrative:      The following orders were created for panel order CBC & Differential.  Procedure                               Abnormality         Status                     ---------                               -----------         ------                     CBC Auto Differential[[id number]]        Abnormal            Final result                 Please view results for these tests on the individual orders.    CBC Auto Differential [[id number]]  (Abnormal) Collected: 08/28/24 1701    Specimen: Blood Updated: 08/28/24 1723     WBC 10.86 10*3/mm3      RBC 4.57 10*6/mm3      Hemoglobin 13.8 g/dL      Hematocrit 40.3 %      MCV 88.2 fL      MCH 30.2 pg      MCHC 34.2 g/dL      RDW 12.6 %      RDW-SD 39.7 fl      MPV 12.2 fL      Platelets 174 10*3/mm3      Neutrophil % 69.8 %      Lymphocyte % 21.2 %      Monocyte % 7.0 %      Eosinophil % 0.4 %      Basophil % 1.1 %      Immature Grans % 0.5 %      Neutrophils, Absolute 7.59 10*3/mm3      Lymphocytes, Absolute 2.30 10*3/mm3      Monocytes, Absolute 0.76 10*3/mm3      Eosinophils, Absolute 0.04 10*3/mm3      Basophils, Absolute 0.12 10*3/mm3      Immature Grans, Absolute 0.05 10*3/mm3      nRBC 0.0 /100 WBC           Assessment:    Pregnancy      1.  Intrauterine pregnancy at 37-0/7 weeks  2.  Chronic hypertension with  superimposed severe range pressures  3.  Diabetes requiring insulin  4.  Breech presentation  5.  Prior  delivery x 2  6.  Desires permanent sterility.    Plan:  Repeat  delivery with tubal sterilization.  I counseled the patient regarding the procedure itself as well as its benefits, risks and alternatives.  I answered the patient's questions and she would like to proceed.  Because of severe range pressures delivery is performed at 37 weeks.  Because of 2 prior cesareans and breech presentation, this will be a repeat .    Jose Elias Cooley MD  2024

## 2024-08-29 PROBLEM — Z98.891 S/P CESAREAN SECTION: Status: ACTIVE | Noted: 2024-08-29

## 2024-08-29 PROBLEM — I10 CHRONIC HYPERTENSION: Status: ACTIVE | Noted: 2024-08-29

## 2024-08-29 LAB
BASOPHILS # BLD AUTO: 0.1 10*3/MM3 (ref 0–0.2)
BASOPHILS NFR BLD AUTO: 0.9 % (ref 0–1.5)
DEPRECATED RDW RBC AUTO: 41.3 FL (ref 37–54)
EOSINOPHIL # BLD AUTO: 0.02 10*3/MM3 (ref 0–0.4)
EOSINOPHIL NFR BLD AUTO: 0.2 % (ref 0.3–6.2)
ERYTHROCYTE [DISTWIDTH] IN BLOOD BY AUTOMATED COUNT: 12.7 % (ref 12.3–15.4)
GLUCOSE BLDC GLUCOMTR-MCNC: 78 MG/DL (ref 70–130)
HCT VFR BLD AUTO: 31.3 % (ref 34–46.6)
HGB BLD-MCNC: 10.7 G/DL (ref 12–15.9)
IMM GRANULOCYTES # BLD AUTO: 0.04 10*3/MM3 (ref 0–0.05)
IMM GRANULOCYTES NFR BLD AUTO: 0.4 % (ref 0–0.5)
LYMPHOCYTES # BLD AUTO: 2.4 10*3/MM3 (ref 0.7–3.1)
LYMPHOCYTES NFR BLD AUTO: 22.7 % (ref 19.6–45.3)
MCH RBC QN AUTO: 31 PG (ref 26.6–33)
MCHC RBC AUTO-ENTMCNC: 34.2 G/DL (ref 31.5–35.7)
MCV RBC AUTO: 90.7 FL (ref 79–97)
MONOCYTES # BLD AUTO: 0.92 10*3/MM3 (ref 0.1–0.9)
MONOCYTES NFR BLD AUTO: 8.7 % (ref 5–12)
NEUTROPHILS NFR BLD AUTO: 67.1 % (ref 42.7–76)
NEUTROPHILS NFR BLD AUTO: 7.09 10*3/MM3 (ref 1.7–7)
NRBC BLD AUTO-RTO: 0 /100 WBC (ref 0–0.2)
PLATELET # BLD AUTO: 134 10*3/MM3 (ref 140–450)
PMV BLD AUTO: 12.4 FL (ref 6–12)
RBC # BLD AUTO: 3.45 10*6/MM3 (ref 3.77–5.28)
WBC NRBC COR # BLD AUTO: 10.57 10*3/MM3 (ref 3.4–10.8)

## 2024-08-29 PROCEDURE — 25010000002 ENOXAPARIN PER 10 MG: Performed by: OBSTETRICS & GYNECOLOGY

## 2024-08-29 PROCEDURE — 82948 REAGENT STRIP/BLOOD GLUCOSE: CPT

## 2024-08-29 PROCEDURE — 25010000002 KETOROLAC TROMETHAMINE PER 15 MG: Performed by: OBSTETRICS & GYNECOLOGY

## 2024-08-29 PROCEDURE — 85025 COMPLETE CBC W/AUTO DIFF WBC: CPT | Performed by: OBSTETRICS & GYNECOLOGY

## 2024-08-29 PROCEDURE — 0503F POSTPARTUM CARE VISIT: CPT

## 2024-08-29 RX ORDER — NIFEDIPINE 60 MG/1
60 TABLET, EXTENDED RELEASE ORAL ONCE
Status: COMPLETED | OUTPATIENT
Start: 2024-08-29 | End: 2024-08-29

## 2024-08-29 RX ORDER — OXYCODONE HYDROCHLORIDE 10 MG/1
10 TABLET ORAL EVERY 4 HOURS PRN
Status: DISCONTINUED | OUTPATIENT
Start: 2024-08-29 | End: 2024-08-30 | Stop reason: HOSPADM

## 2024-08-29 RX ORDER — ACETAMINOPHEN 325 MG/1
650 TABLET ORAL EVERY 6 HOURS
Status: DISCONTINUED | OUTPATIENT
Start: 2024-08-29 | End: 2024-08-30 | Stop reason: HOSPADM

## 2024-08-29 RX ORDER — ONDANSETRON 2 MG/ML
4 INJECTION INTRAMUSCULAR; INTRAVENOUS EVERY 6 HOURS PRN
Status: DISCONTINUED | OUTPATIENT
Start: 2024-08-29 | End: 2024-08-30 | Stop reason: HOSPADM

## 2024-08-29 RX ORDER — HYDROMORPHONE HYDROCHLORIDE 1 MG/ML
0.5 INJECTION, SOLUTION INTRAMUSCULAR; INTRAVENOUS; SUBCUTANEOUS
Status: DISCONTINUED | OUTPATIENT
Start: 2024-08-29 | End: 2024-08-30 | Stop reason: HOSPADM

## 2024-08-29 RX ORDER — ENOXAPARIN SODIUM 100 MG/ML
40 INJECTION SUBCUTANEOUS NIGHTLY
Status: DISCONTINUED | OUTPATIENT
Start: 2024-08-29 | End: 2024-08-30 | Stop reason: HOSPADM

## 2024-08-29 RX ORDER — ACETAMINOPHEN 500 MG
1000 TABLET ORAL EVERY 6 HOURS
Status: DISPENSED | OUTPATIENT
Start: 2024-08-29 | End: 2024-08-29

## 2024-08-29 RX ORDER — NALOXONE HCL 0.4 MG/ML
0.1 VIAL (ML) INJECTION
Status: DISCONTINUED | OUTPATIENT
Start: 2024-08-29 | End: 2024-08-30 | Stop reason: HOSPADM

## 2024-08-29 RX ORDER — HYDROXYZINE HYDROCHLORIDE 50 MG/1
50 TABLET, FILM COATED ORAL EVERY 6 HOURS PRN
Status: DISCONTINUED | OUTPATIENT
Start: 2024-08-29 | End: 2024-08-30 | Stop reason: HOSPADM

## 2024-08-29 RX ORDER — KETOROLAC TROMETHAMINE 15 MG/ML
15 INJECTION, SOLUTION INTRAMUSCULAR; INTRAVENOUS EVERY 6 HOURS
Status: COMPLETED | OUTPATIENT
Start: 2024-08-29 | End: 2024-08-29

## 2024-08-29 RX ORDER — NIFEDIPINE 60 MG/1
60 TABLET, EXTENDED RELEASE ORAL EVERY 12 HOURS
Status: DISCONTINUED | OUTPATIENT
Start: 2024-08-29 | End: 2024-08-30 | Stop reason: HOSPADM

## 2024-08-29 RX ORDER — MISOPROSTOL 200 UG/1
600 TABLET ORAL ONCE AS NEEDED
Status: DISCONTINUED | OUTPATIENT
Start: 2024-08-29 | End: 2024-08-30 | Stop reason: HOSPADM

## 2024-08-29 RX ORDER — DOCUSATE SODIUM 100 MG/1
100 CAPSULE, LIQUID FILLED ORAL 2 TIMES DAILY PRN
Status: DISCONTINUED | OUTPATIENT
Start: 2024-08-29 | End: 2024-08-30 | Stop reason: HOSPADM

## 2024-08-29 RX ORDER — OXYCODONE HYDROCHLORIDE 5 MG/1
5 TABLET ORAL EVERY 4 HOURS PRN
Status: DISCONTINUED | OUTPATIENT
Start: 2024-08-29 | End: 2024-08-30 | Stop reason: HOSPADM

## 2024-08-29 RX ORDER — LEVOTHYROXINE SODIUM 75 UG/1
75 TABLET ORAL DAILY
Status: DISCONTINUED | OUTPATIENT
Start: 2024-08-29 | End: 2024-08-30 | Stop reason: HOSPADM

## 2024-08-29 RX ORDER — IBUPROFEN 600 MG/1
600 TABLET, FILM COATED ORAL EVERY 6 HOURS
Status: DISCONTINUED | OUTPATIENT
Start: 2024-08-30 | End: 2024-08-30 | Stop reason: HOSPADM

## 2024-08-29 RX ADMIN — ACETAMINOPHEN 325MG 650 MG: 325 TABLET ORAL at 21:58

## 2024-08-29 RX ADMIN — ENOXAPARIN SODIUM 40 MG: 100 INJECTION SUBCUTANEOUS at 20:41

## 2024-08-29 RX ADMIN — NIFEDIPINE 60 MG: 60 TABLET, FILM COATED, EXTENDED RELEASE ORAL at 08:33

## 2024-08-29 RX ADMIN — DOCUSATE SODIUM 100 MG: 100 CAPSULE, LIQUID FILLED ORAL at 20:41

## 2024-08-29 RX ADMIN — NIFEDIPINE 60 MG: 60 TABLET, FILM COATED, EXTENDED RELEASE ORAL at 20:41

## 2024-08-29 RX ADMIN — KETOROLAC TROMETHAMINE 15 MG: 15 INJECTION, SOLUTION INTRAMUSCULAR; INTRAVENOUS at 08:33

## 2024-08-29 RX ADMIN — ACETAMINOPHEN 1000 MG: 500 TABLET ORAL at 15:59

## 2024-08-29 RX ADMIN — ACETAMINOPHEN 1000 MG: 500 TABLET ORAL at 10:33

## 2024-08-29 RX ADMIN — ACETAMINOPHEN 1000 MG: 500 TABLET ORAL at 04:21

## 2024-08-29 RX ADMIN — KETOROLAC TROMETHAMINE 15 MG: 15 INJECTION, SOLUTION INTRAMUSCULAR; INTRAVENOUS at 01:17

## 2024-08-29 RX ADMIN — NIFEDIPINE 60 MG: 60 TABLET, FILM COATED, EXTENDED RELEASE ORAL at 01:16

## 2024-08-29 RX ADMIN — KETOROLAC TROMETHAMINE 15 MG: 15 INJECTION, SOLUTION INTRAMUSCULAR; INTRAVENOUS at 14:00

## 2024-08-29 RX ADMIN — LEVOTHYROXINE SODIUM 75 MCG: 75 TABLET ORAL at 06:20

## 2024-08-29 RX ADMIN — KETOROLAC TROMETHAMINE 15 MG: 15 INJECTION, SOLUTION INTRAMUSCULAR; INTRAVENOUS at 20:40

## 2024-08-29 NOTE — PLAN OF CARE
Goal Outcome Evaluation:  Plan of Care Reviewed With: patient        Progress: improving       Vital signs stable. Procardia 60 XL given for increased blood pressure. Fundus firm, lochia scant. Incision dressing clean, dry, and intact. No drainage noted. Pt tolerating fluids. Ambulated to bathroom well with assistance.  Pain is controlled with scheduled medication. Andrade catheter to bedside drainage. Pt was set up with hospital grade breast pump while infant was transitioning in NICU.

## 2024-08-29 NOTE — OP NOTE
Operative Note      Callie Parks  33 y.o.  1990  female  [id number]      2024    Surgeons and Role:     * Jose Elias Cooley MD - Primary     * Vanda Flowers MD - Assisting     Pre-op Diagnosis:   1.  Intrauterine pregnancy at 37-0/7 weeks  2.  Hypertension with severe range pressures  3.  Insulin requiring diabetes  4.  Breech presentation  5.  Prior  delivery x 2  6.  Desires permanent sterility.        Post-operative Diagnosis: Same                  Assist= Dr. Flowers.  Her responsibilities included exposure, assistance with delivery of the infant, knot-tying, assistance with clinical judgment.  Her participation was integral to the performance of the procedure.  Findings/Complications:  1.  Vigorous male  in claudia breech presentation.  Weight 7 pounds 8 ounces.  Apgars 8, 8.  2.  Placenta accreta with a cleavage plane present.    Description of procedure:  Repeat  delivery with bilateral salpingectomy        The patient was taken to the operating room where spinal anesthesia was induced.  She was placed in a comfortable supine position on the operating room table.  The abdomen was prepped and draped in the usual sterile fashion and adequacy of anesthesia was confirmed using the Allis test.  The patient's existing Pfannenstiel incision was opened.  Dissection was carried sharply down to the fascia.  There were significant adhesions of subcutaneous tissue to the fascia.  These were carefully taken down to expose the fascia.  The fascia was incised in the midline.  Dissection was carried laterally in both directions.  The superior fascial flap was created.  Rectus muscles were  and the peritoneal cavity was entered.  The uterovesical fold of peritoneum was grasped and incised.  Bladder flap was then taken down.      A transverse incision was made in the lower uterine segment.  This was extended laterally in both directions.  The infant was presenting as a claudia  breech.  The breech was delivered along with the after coming the legs.  Both after coming arms were carefully swept and the head was delivered without difficulty.  The mouth and naris were suctioned with a bulb while on the abdomen.      After delay of 30 seconds, the cord was doubly clamped and divided.  The infant was then handed to the waiting delivery team who assigned Apgars of 8 and 8.      Attempts were made to express the placenta.  It did not respond to these attempts.  For this reason, the uterus was exteriorized from the abdominal wound and the placental site was carefully examined.  The placenta was posterior.  It was densely adherent to the lower uterine segment.  I was able to find a cleavage plane by exploring the edges of the placenta.  I was able to develop this plane manually and remove the placenta.  It was sent to pathology as a specimen.  Moist laparotomy sponges were used to assess the endometrium and remove any remaining clots and debris.  There was no evidence of retained placenta.      Uterine tone was suboptimal.  For this reason, uterine massage was employed along with 0.2 mg of Methergine.  This brought about resumption of appropriate uterine tone.  Attention was turned to the uterine incision.  This was approximated with a locked running suture of 0 Vicryl.  A second layer was placed, imbricating the first.  The incision was examined.  There was a bleeding point near the left margin of the incision.  This was isolated and controlled with a 0 Vicryl suture in a figure-of-eight fashion.  There was an additional bleeding point closer to the center.  This was also controlled with 0 Vicryl.  The incision was reexamined and it was hemostatic.      The uterine incision was packed and attention was turned to salpingectomy.  The patient's right fallopian tube was run from the uterine cornu to its fimbriated end.  The tube was elevated into the operative field using Mansfield clamps x 2.  The  Enseal harmonic scalpel was then used to divide the mesosalpinx.  This was then brought across the fallopian tube proximally.  The tube was sent to pathology as a specimen.  The salpingectomy site was examined.  It was hemostatic.  Attention was then turned to the left side.  The left tube was elevated into the operative field using Michael clamps x 2.  There was an adhesion of the fimbriated end of the tube to the mesovarium and ovary.  This was taken down using the Enseal harmonic scalpel.  The harmonic scalpel was then used to to come across the tube proximally and across the mesosalpinx.  The left fallopian tube was sent to pathology as a specimen.  The salpingectomy site was examined.  There was a small bleeding point near the adhesion of fimbriated end to ovary.  This was grasped with a hemostat and controlled with the Bovie electrocautery.  This brought about complete hemostasis.      The pelvis was irrigated with large amounts of warm water and the uterus was reduced back into the abdomen.  The uterine incision was reexamined without tension.  It was hemostatic.  There were 2 small bleeding points along the anterior surface of the uterus.  These were isolated and controlled with electrocautery.  The uterus was reexamined without tension.  It was completely hemostatic.  Both tubal sites were also hemostatic.      All laparotomy sponges and instruments were removed and all counts were correct.  Attention was turned to closure.  The muscle was approximated with interrupted Vicryl sutures.  The fascia was approximated with a running 0 Vicryl.  Subcutaneous tissue was examined and small bleeding points were controlled with electrocautery.  The subcutaneous was approximated with 3-0 plain gut.  The skin was approximated with 4-0 Vicryl in a running subcuticular fashion.  The patient was taken to recovery in stable condition.  Anesthesia= Spinal    Estimated Blood Loss:  550cc    Specimens:   Order Name Source  Comment Collection Info Order Time   TISSUE PATHOLOGY EXAM Fallopian Tubes, Bilateral  Collected By: Jose Elias Cooley MD 8/28/2024  8:25 PM     Release to patient   Routine Release            [unfilled]      Jose Elias Cooley MD  8/28/2024

## 2024-08-29 NOTE — PLAN OF CARE
Problem: Adult Inpatient Plan of Care  Goal: Plan of Care Review  Outcome: Ongoing, Progressing  Flowsheets (Taken 2024 2250)  Outcome Evaluation: Pt delviered  sewction on  at . Apgars were 8 and 8. Blood loss was 550 mL. Methergine and TXA were given while in OR. Pt is firm and scant to light bleeding. OK to continue plan of care.  Goal: Patient-Specific Goal (Individualized)  Outcome: Ongoing, Progressing  Goal: Absence of Hospital-Acquired Illness or Injury  Outcome: Ongoing, Progressing  Intervention: Identify and Manage Fall Risk  Recent Flowsheet Documentation  Taken 2024 by Vi Benites RN  Safety Promotion/Fall Prevention: safety round/check completed  Intervention: Prevent and Manage VTE (Venous Thromboembolism) Risk  Recent Flowsheet Documentation  Taken 2024 by Vi Benites RN  Activity Management: bedrest  VTE Prevention/Management: sequential compression devices on  Goal: Optimal Comfort and Wellbeing  Outcome: Ongoing, Progressing  Intervention: Provide Person-Centered Care  Recent Flowsheet Documentation  Taken 2024 by Vi Benites RN  Trust Relationship/Rapport:   thoughts/feelings acknowledged   reassurance provided   questions encouraged   questions answered   empathic listening provided   emotional support provided   choices provided   care explained  Goal: Readiness for Transition of Care  Outcome: Ongoing, Progressing     Problem: Skin Injury Risk Increased  Goal: Skin Health and Integrity  Outcome: Ongoing, Progressing     Problem: Device-Related Complication Risk (Anesthesia/Analgesia, Neuraxial)  Goal: Safe Infusion Delivery Completion  Outcome: Ongoing, Progressing     Problem: Infection (Anesthesia/Analgesia, Neuraxial)  Goal: Absence of Infection Signs and Symptoms  Outcome: Ongoing, Progressing     Problem: Nausea and Vomiting (Anesthesia/Analgesia, Neuraxial)  Goal: Nausea and Vomiting Relief  Outcome: Ongoing, Progressing      Problem: Pain (Anesthesia/Analgesia, Neuraxial)  Goal: Effective Pain Control  Outcome: Ongoing, Progressing     Problem: Respiratory Compromise (Anesthesia/Analgesia, Neuraxial)  Goal: Effective Oxygenation and Ventilation  Outcome: Ongoing, Progressing     Problem: Sensorimotor Impairment (Anesthesia/Analgesia, Neuraxial)  Goal: Baseline Motor Function  Outcome: Ongoing, Progressing  Intervention: Optimize Sensorimotor Function  Recent Flowsheet Documentation  Taken 2024 by Vi Benites RN  Safety Promotion/Fall Prevention: safety round/check completed     Problem: Urinary Retention (Anesthesia/Analgesia, Neuraxial)  Goal: Effective Urinary Elimination  Outcome: Ongoing, Progressing     Problem: Bleeding ( Delivery)  Goal: Bleeding is Controlled  Outcome: Ongoing, Progressing     Problem: Change in Fetal Wellbeing ( Delivery)  Goal: Stable Fetal Wellbeing  Outcome: Ongoing, Progressing     Problem: Infection ( Delivery)  Goal: Absence of Infection Signs and Symptoms  Outcome: Ongoing, Progressing     Problem: Respiratory Compromise ( Delivery)  Goal: Effective Oxygenation and Ventilation  Outcome: Ongoing, Progressing   Goal Outcome Evaluation:              Outcome Evaluation: Pt delviered  sewction on  at . Apgars were 8 and 8. Blood loss was 550 mL. Methergine and TXA were given while in OR. Pt is firm and scant to light bleeding. OK to continue plan of care. Toradol was held due to some extra bleeding. Pt is stable.

## 2024-08-29 NOTE — PROGRESS NOTES
PROGRESS NOTE:   POSTOP DAY 1      PATIENT: Callie Parsk        MR#:[id number]  LOCATION: [facility]  DATE OF ADMISSION: 2024  ADMISSION  DIAGNOSIS:   Pregnancy    Single delivery by  section    Chronic hypertension    Gestational diabetes mellitus (GDM), postpartum     CURRENT DIAGNOSIS: No notes have been filed under this hospital service.  Service: Hospitalist    SERVICE: Obstetrics      Pregnancy    Single delivery by  section    Chronic hypertension    Gestational diabetes mellitus (GDM), postpartum        PROCEDURES:  , Low Transverse     2024    8:26 PM      ASSESSMENT/PLAN  Status Post  Delivery Day 1:   Postpartum care immediately following  delivery: Doing well postoperatively. Continue routine postoperative and supportive care. Discontinue IV, advance diet, may shower.  DVT Prophylaxis: BMI 37.43. SCD's while at rest. Encouraged ambulation. On lovenox for DVT prevention.   Hgb: 10.7.  Chronic hypertension: slightly elevated on postpartum. She is currently on 60 mg of nifedipine XL daily.   GDMA2: Blood sugar this morning of 78 fasting.      RH STATUS: O positive  SYPHILIS SCREEN DELIVERY ADMIT: treponemal antibody non-reactive upon admission  RUBELLA: non-immune, MMR vaccine ordered to be given on postpartum prior to discharge  VARICELLA: immune  INFANT GENDER: male, desires circumcision when infant clinical picture allows      Subjective    33 y.o. yo Female  status post  section day 1 at 37w0d doing well. Callie denies any emotional concerns. Pain well controlled with current medications. Lochia appropriate. She is tolerating a regular diet and passing flatus. Urinary output has been adequate.         Objective   Vitals  Temp:  Temp:  [97.6 °F (36.4 °C)-98.6 °F (37 °C)] 98 °F (36.7 °C)  Temp src: Oral  BP:  BP: ()/() 142/86  Pulse:  Heart Rate:  [] 56  RR:   Resp:  [15-20] 18    General:   Awake, alert, no acute distress   Cardiac: Regular rate and rhythm    Respiratory: Lungs clear bilaterally, normal respiratory effort    Abdomen: Soft, non-distended, fundus firm, below umbilicus, appropriately tender   Incision: Healing well, no dehiscence, no significant drainage, no erythema or exudate   Pelvis: deferred   Extremities: Calves NT bilaterally, DTR 2+, no clonus noted, trace edema     I/O last 3 completed shifts:  In: 2150 [I.V.:2150]  Out: 1850 [Urine:1300; Blood:550]  Lab Results   Component Value Date    WBC 10.57 08/29/2024    HGB 10.7 (L) 08/29/2024    HCT 31.3 (L) 08/29/2024    MCV 90.7 08/29/2024     (L) 08/29/2024     (H) 05/01/2021    POCGLU 78 08/29/2024    CREATININE 0.57 08/28/2024    URICACID 5.7 07/24/2024    AST 59 (H) 08/28/2024    ALT 72 (H) 08/28/2024     07/24/2024    HEPBSAG Non-Reactive 04/30/2024     Results from last 7 days   Lab Units 08/28/24  1701   ABO TYPING  O   RH TYPING  Positive   ANTIBODY SCREEN  Negative       Chanell Stanton CNM  8/29/2024   09:45 EDT

## 2024-08-29 NOTE — LACTATION NOTE
"This note was copied from a baby's chart.  P3 37w0d baby. Mom reports baby has been sleepy with breast feeding and she has been pumping with [facility] giving baby EBM. She pumped 2mls this am, gave to baby and formula was given per request at present.  Mom reports she exclusively pumped with her other two children and the last baby she pumped for one year. She plans to feeding formula only to current baby if, \"he does not figure breast feeding out\". She has personal breast pump at home.  Mom denies any concerns or questions regarding breast feeding or pumping and encouraged to call for any assistance.  "

## 2024-08-29 NOTE — ANESTHESIA PROCEDURE NOTES
Spinal Block      Patient reassessed immediately prior to procedure    Patient location during procedure: OR  Start Time: 8/28/2024 8:00 PM  Stop Time: 8/28/2024 8:06 PM  Indication:at surgeon's request and post-op pain management  Performed By  Anesthesiologist: Juan C Grier MD  Preanesthetic Checklist  Completed: patient identified, IV checked, site marked, risks and benefits discussed, surgical consent, monitors and equipment checked, pre-op evaluation and timeout performed  Spinal Block Prep:  Patient Position:sitting  Sterile Tech:cap, gown, mask and sterile barriers  Prep:Chloraprep  Patient Monitoring:blood pressure monitoring, continuous pulse oximetry and EKG    Spinal Block Procedure  Approach:midline  Guidance:landmark technique and palpation technique  Location:L3-L4  Needle Type:Pencan  Needle Gauge:25 G  Placement of Spinal needle event:cerebrospinal fluid aspirated  Paresthesia: no  Fluid Appearance:clear  Medications: fentaNYL citrate (PF) (SUBLIMAZE) injection - Intrathecal   20 mcg - 8/28/2024 8:06:00 PM  Morphine sulfate (PF) injection - Spinal   150 mcg - 8/28/2024 8:06:00 PM  bupivacaine PF (MARCAINE) 0.75 % injection - Spinal   1.6 mL - 8/28/2024 8:06:00 PM   Post Assessment  Patient Tolerance:patient tolerated the procedure well with no apparent complications  Complications no

## 2024-08-29 NOTE — ANESTHESIA POSTPROCEDURE EVALUATION
"Patient: Callie Parks    Procedure Summary       Date: 24 Room / Location:  [facility]    Anesthesia Start:  Anesthesia Stop:     Procedure:  SECTION REPEAT (Abdomen) Diagnosis: (HTN)    Surgeons: Jose Elias Cooley MD Provider: Brigido Stephen MD    Anesthesia Type: spinal ASA Status: 2            Anesthesia Type: spinal    Vitals  Vitals Value Taken Time   /77 240   Temp 36.4 °C (97.6 °F) 24   Pulse 55 24   Resp 16 24   SpO2 100 % 24   Vitals shown include unfiled device data.        Post Anesthesia Care and Evaluation    Level of consciousness: awake and alert  Pain management: adequate    Airway patency: patent  Anesthetic complications: No anesthetic complications  PONV Status: none  Cardiovascular status: acceptable  Respiratory status: acceptable  Hydration status: acceptable    Comments: /89   Pulse 55   Temp 36.4 °C (97.6 °F) (Oral)   Resp 16   Ht 170.2 cm (67\")   Wt 108 kg (239 lb)   LMP 2023 (Approximate)   SpO2 100%   Breastfeeding Yes   BMI 37.43 kg/m²       "

## 2024-08-30 VITALS
RESPIRATION RATE: 16 BRPM | BODY MASS INDEX: 37.51 KG/M2 | TEMPERATURE: 98.3 F | SYSTOLIC BLOOD PRESSURE: 129 MMHG | WEIGHT: 239 LBS | HEIGHT: 67 IN | HEART RATE: 94 BPM | OXYGEN SATURATION: 96 % | DIASTOLIC BLOOD PRESSURE: 85 MMHG

## 2024-08-30 LAB
CYTO UR: NORMAL
GLUCOSE BLDC GLUCOMTR-MCNC: 79 MG/DL (ref 70–130)
LAB AP CASE REPORT: NORMAL
PATH REPORT.FINAL DX SPEC: NORMAL
PATH REPORT.GROSS SPEC: NORMAL

## 2024-08-30 PROCEDURE — 82948 REAGENT STRIP/BLOOD GLUCOSE: CPT

## 2024-08-30 PROCEDURE — 0503F POSTPARTUM CARE VISIT: CPT | Performed by: NURSE PRACTITIONER

## 2024-08-30 RX ORDER — IBUPROFEN 600 MG/1
600 TABLET, FILM COATED ORAL EVERY 6 HOURS
Qty: 90 TABLET | Refills: 1 | Status: SHIPPED | OUTPATIENT
Start: 2024-08-30

## 2024-08-30 RX ORDER — PSEUDOEPHEDRINE HCL 30 MG
100 TABLET ORAL 2 TIMES DAILY PRN
Qty: 30 CAPSULE | Refills: 0 | Status: SHIPPED | OUTPATIENT
Start: 2024-08-30

## 2024-08-30 RX ADMIN — IBUPROFEN 600 MG: 600 TABLET, FILM COATED ORAL at 02:23

## 2024-08-30 RX ADMIN — IBUPROFEN 600 MG: 600 TABLET, FILM COATED ORAL at 08:14

## 2024-08-30 RX ADMIN — LEVOTHYROXINE SODIUM 75 MCG: 75 TABLET ORAL at 06:12

## 2024-08-30 RX ADMIN — ACETAMINOPHEN 325MG 650 MG: 325 TABLET ORAL at 04:28

## 2024-08-30 RX ADMIN — NIFEDIPINE 60 MG: 60 TABLET, FILM COATED, EXTENDED RELEASE ORAL at 08:14

## 2024-08-30 RX ADMIN — DOCUSATE SODIUM 100 MG: 100 CAPSULE, LIQUID FILLED ORAL at 08:14

## 2024-08-30 NOTE — PLAN OF CARE
Goal Outcome Evaluation:  Plan of Care Reviewed With: patient, spouse        Progress: improving     Vital signs stable. Fundus firm, lochia light. Pain is controlled with po medication. Up ad dina in room. Voiding without difficulty. Tolerating regular diet.

## 2024-08-30 NOTE — LACTATION NOTE
This note was copied from a baby's chart.  Mom reports she continues to try breast feeding baby but he only stays awake for a few minutes and she is pumping with [facility]. Baby is being supplemented with formula.

## 2024-08-30 NOTE — PLAN OF CARE
Problem: Adult Inpatient Plan of Care  Goal: Plan of Care Review  Outcome: Met  Goal: Patient-Specific Goal (Individualized)  Outcome: Met  Goal: Absence of Hospital-Acquired Illness or Injury  Outcome: Met  Intervention: Identify and Manage Fall Risk  Recent Flowsheet Documentation  Taken 8/30/2024 1200 by Isidra Duncan RN  Safety Promotion/Fall Prevention: safety round/check completed  Taken 8/30/2024 0814 by Isidra Duncan RN  Safety Promotion/Fall Prevention: safety round/check completed  Intervention: Prevent Skin Injury  Recent Flowsheet Documentation  Taken 8/30/2024 0814 by Isidra Duncan RN  Body Position: sitting up in bed  Intervention: Prevent and Manage VTE (Venous Thromboembolism) Risk  Recent Flowsheet Documentation  Taken 8/30/2024 0814 by Isidra Duncan RN  Activity Management: up ad dina  Intervention: Prevent Infection  Recent Flowsheet Documentation  Taken 8/30/2024 0814 by Isidra Duncan RN  Infection Prevention: hand hygiene promoted  Goal: Optimal Comfort and Wellbeing  Outcome: Met  Intervention: Monitor Pain and Promote Comfort  Recent Flowsheet Documentation  Taken 8/30/2024 0814 by Isidra Duncan RN  Pain Management Interventions: see MAR  Intervention: Provide Person-Centered Care  Recent Flowsheet Documentation  Taken 8/30/2024 0814 by Isidra Duncan RN  Trust Relationship/Rapport:   care explained   choices provided   questions answered  Goal: Readiness for Transition of Care  Outcome: Met     Problem: Skin Injury Risk Increased  Goal: Skin Health and Integrity  Outcome: Met  Intervention: Optimize Skin Protection  Recent Flowsheet Documentation  Taken 8/30/2024 0814 by Isidra Duncan RN  Head of Bed (HOB) Positioning: HOB elevated     Problem: Device-Related Complication Risk (Anesthesia/Analgesia, Neuraxial)  Goal: Safe Infusion Delivery Completion  Outcome: Met     Problem: Infection (Anesthesia/Analgesia, Neuraxial)  Goal: Absence of Infection Signs and  Symptoms  Outcome: Met  Intervention: Prevent or Manage Infection  Recent Flowsheet Documentation  Taken 2024 0814 by Isidra Duncan RN  Infection Prevention: hand hygiene promoted     Problem: Nausea and Vomiting (Anesthesia/Analgesia, Neuraxial)  Goal: Nausea and Vomiting Relief  Outcome: Met     Problem: Pain (Anesthesia/Analgesia, Neuraxial)  Goal: Effective Pain Control  Outcome: Met  Intervention: Prevent or Manage Pain  Recent Flowsheet Documentation  Taken 2024 0814 by Isidra Duncan RN  Pain Management Interventions: see MAR     Problem: Respiratory Compromise (Anesthesia/Analgesia, Neuraxial)  Goal: Effective Oxygenation and Ventilation  Outcome: Met  Intervention: Optimize Oxygenation and Ventilation  Recent Flowsheet Documentation  Taken 2024 0814 by Isidra Duncan RN  Head of Bed (HOB) Positioning: HOB elevated     Problem: Sensorimotor Impairment (Anesthesia/Analgesia, Neuraxial)  Goal: Baseline Motor Function  Outcome: Met  Intervention: Optimize Sensorimotor Function  Recent Flowsheet Documentation  Taken 2024 1200 by Isidra Duncan RN  Safety Promotion/Fall Prevention: safety round/check completed  Taken 2024 0814 by Isidra Duncan RN  Safety Promotion/Fall Prevention: safety round/check completed     Problem: Urinary Retention (Anesthesia/Analgesia, Neuraxial)  Goal: Effective Urinary Elimination  Outcome: Met     Problem: Bleeding ( Delivery)  Goal: Bleeding is Controlled  Outcome: Met     Problem: Change in Fetal Wellbeing ( Delivery)  Goal: Stable Fetal Wellbeing  Outcome: Met  Intervention: Promote and Monitor Fetal Wellbeing  Recent Flowsheet Documentation  Taken 2024 0814 by Isidra Duncan RN  Body Position: sitting up in bed     Problem: Infection ( Delivery)  Goal: Absence of Infection Signs and Symptoms  Outcome: Met  Intervention: Minimize Infection Risk  Recent Flowsheet Documentation  Taken 2024 0814 by Dakota  CHARLIE Miner  Infection Prevention: hand hygiene promoted     Problem: Respiratory Compromise ( Delivery)  Goal: Effective Oxygenation and Ventilation  Outcome: Met     Problem: Adjustment to Role Transition (Postpartum  Delivery)  Goal: Successful Maternal Role Transition  Outcome: Met  Intervention: Support Maternal Role Transition  Recent Flowsheet Documentation  Taken 2024 by Isidra Duncan RN  Supportive Measures: active listening utilized  Parent/Child Attachment Promotion:   caring behavior modeled   rooming-in promoted     Problem: Bleeding (Postpartum  Delivery)  Goal: Hemostasis  Outcome: Met     Problem: Infection (Postpartum  Delivery)  Goal: Absence of Infection Signs and Symptoms  Outcome: Met     Problem: Pain (Postpartum  Delivery)  Goal: Acceptable Pain Control  Outcome: Met  Intervention: Prevent or Manage Pain  Recent Flowsheet Documentation  Taken 2024 by Isidra Duncan RN  Pain Management Interventions: see MAR     Problem: Postoperative Nausea and Vomiting (Postpartum  Delivery)  Goal: Nausea and Vomiting Relief  Outcome: Met     Problem: Postoperative Urinary Retention (Postpartum  Delivery)  Goal: Effective Urinary Elimination  Outcome: Met     Problem: Breastfeeding  Goal: Effective Breastfeeding  Outcome: Met  Intervention: Support Exclusive Breastfeeding Success  Recent Flowsheet Documentation  Taken 2024 by Isidra Duncan RN  Supportive Measures: active listening utilized  Intervention: Promote Effective Breastfeeding  Recent Flowsheet Documentation  Taken 2024 by Isidra Duncan RN  Parent/Child Attachment Promotion:   caring behavior modeled   rooming-in promoted     Problem: Hypertensive Disorders in Pregnancy  Goal: Maternal-Fetal Stabilization  Outcome: Met  Intervention: Monitor and Manage Symptom Progression  Recent Flowsheet Documentation  Taken 2024 by Isidra Duncan  RN  Medication Review/Management: medications reviewed   Goal Outcome Evaluation:

## 2024-08-30 NOTE — PROGRESS NOTES
Section Progress Note    Assessment & Plan     Status post  section: Doing well postoperatively.   Continue current care. Desires discharge home today. Reviewed discharge instructions in detail     2.  DVT Prophylaxis: BMI 37.43.  SCD's while at rest. Encouraged ambulation. On Lovenox.     3. CHTN with superimposed pre-eclampsia with severe features: BP improved with BID procardia XL 60 mg. Reports she was taking BID antepartum, however after delivery this was initially ordered daily. Doing better with BID use and will plan to continue. BP normal to mild range. She is asymptomatic. She will RTO in one week for BP check. Reviewed preeclampsia precautions in detail    4. GDMA2: FBS 79. Follow up postpartum for further testing    5. Postpartum anemia: Mild. Hgb 13.8-->10.7. Asymptomatic.     6. Hypothyroidism: Stable on synthroid    Rh status: O+  Syphilis screen in hospital: non reactive  Rubella: Not immune, MMR ordered  Varicella: immune  Gender: male, desires circumcision     Subjective     Postpartum Day 2:  Delivery    The patient feels well. The patient denies emotional concerns. Pain is well controlled with current medications. The baby is well.Urinary output is adequate. The patient is ambulating well. The patient is tolerating a normal diet. Patient reports passing flatus.    Objective     Vital signs in last 24 hours:  Temp:  [97.8 °F (36.6 °C)-98.3 °F (36.8 °C)] 97.8 °F (36.6 °C)  Heart Rate:  [67-81] 80  Resp:  [16-18] 16  BP: (103-148)/(65-88) 129/85    General:    alert, appears stated age, and cooperative   CV: RRR, no m/r/g   Lungs: CTAB, no wheezes, no respiratory distress   Bowel Sounds:  active   Lochia:  appropriate   Uterine Fundus:   firm   Incision:  healing well, no significant drainage, no dehiscence, no significant erythema   DVT Evaluation:  No evidence of DVT seen on physical exam.     Lab Results   Component Value Date    WBC 10.57 2024    HGB 10.7 (L)  08/29/2024    HCT 31.3 (L) 08/29/2024    MCV 90.7 08/29/2024     (L) 08/29/2024         KERA Vuong  8/30/2024  09:00 EDT

## 2024-08-30 NOTE — DISCHARGE SUMMARY
Date of Discharge:  2024    Discharge Diagnosis: s/p repeat  section    Presenting Problem/History of Present Illness  Pregnancy [Z34.90]  Pregnancy [Z34.90]     Hospital Course  Patient is a 33 y.o. female presented from the office for hypertension.  Her pregnancy was complicated by chronic hypertension with superimposed preeclampsia, insulin requiring gestational diabetes and hypothyroidism.  She delivered a viable male infant weighing 7lb 8.3oz with apgars of 8&8. For further information surrounding this procedure please seen operative note. Her postpartum course has been uncomplicated, her BP is mild to normal range with procardia XL 60 mg BID. She is voiding adequately, passing gas, tolerating a regular diet, incision is intact, and she is ambulating without difficulty or assistance. Her pain is well controlled. We have reviewed discharge instructions in detail.     Procedures Performed  Procedure(s):   SECTION REPEAT       Consults:   Consults       No orders found from 2024 to 2024.            Pertinent Test Results:   WBC   Date Value Ref Range Status   2024 10.57 3.40 - 10.80 10*3/mm3 Final     RBC   Date Value Ref Range Status   2024 3.45 (L) 3.77 - 5.28 10*6/mm3 Final     Hemoglobin   Date Value Ref Range Status   2024 10.7 (L) 12.0 - 15.9 g/dL Final     Hematocrit   Date Value Ref Range Status   2024 31.3 (L) 34.0 - 46.6 % Final     MCV   Date Value Ref Range Status   2024 90.7 79.0 - 97.0 fL Final     MCH   Date Value Ref Range Status   2024 31.0 26.6 - 33.0 pg Final     MCHC   Date Value Ref Range Status   2024 34.2 31.5 - 35.7 g/dL Final     RDW   Date Value Ref Range Status   2024 12.7 12.3 - 15.4 % Final     RDW-SD   Date Value Ref Range Status   2024 41.3 37.0 - 54.0 fl Final     MPV   Date Value Ref Range Status   2024 12.4 (H) 6.0 - 12.0 fL Final     Platelets   Date Value Ref Range Status   2024  134 (L) 140 - 450 10*3/mm3 Final     Neutrophil %   Date Value Ref Range Status   08/29/2024 67.1 42.7 - 76.0 % Final     Lymphocyte %   Date Value Ref Range Status   08/29/2024 22.7 19.6 - 45.3 % Final     Monocyte %   Date Value Ref Range Status   08/29/2024 8.7 5.0 - 12.0 % Final     Eosinophil %   Date Value Ref Range Status   08/29/2024 0.2 (L) 0.3 - 6.2 % Final     Basophil %   Date Value Ref Range Status   08/29/2024 0.9 0.0 - 1.5 % Final     Immature Grans %   Date Value Ref Range Status   08/29/2024 0.4 0.0 - 0.5 % Final     Neutrophils, Absolute   Date Value Ref Range Status   08/29/2024 7.09 (H) 1.70 - 7.00 10*3/mm3 Final     Lymphocytes, Absolute   Date Value Ref Range Status   08/29/2024 2.40 0.70 - 3.10 10*3/mm3 Final     Monocytes, Absolute   Date Value Ref Range Status   08/29/2024 0.92 (H) 0.10 - 0.90 10*3/mm3 Final     Eosinophils, Absolute   Date Value Ref Range Status   08/29/2024 0.02 0.00 - 0.40 10*3/mm3 Final     Basophils, Absolute   Date Value Ref Range Status   08/29/2024 0.10 0.00 - 0.20 10*3/mm3 Final     Immature Grans, Absolute   Date Value Ref Range Status   08/29/2024 0.04 0.00 - 0.05 10*3/mm3 Final     nRBC   Date Value Ref Range Status   08/29/2024 0.0 0.0 - 0.2 /100 WBC Final       Condition on Discharge:  Stable    Vital Signs  Temp:  [97.8 °F (36.6 °C)-98.3 °F (36.8 °C)] 97.8 °F (36.6 °C)  Heart Rate:  [67-81] 80  Resp:  [16-18] 16  BP: (103-148)/(65-88) 129/85    Physical Exam:   See Progress Note    Discharge Disposition  Home or Self Care    Discharge Medications     Discharge Medications        New Medications        Instructions Start Date   docusate sodium 100 MG capsule   100 mg, Oral, 2 Times Daily PRN      ibuprofen 600 MG tablet  Commonly known as: ADVIL,MOTRIN   600 mg, Oral, Every 6 Hours             Continue These Medications        Instructions Start Date   glucose monitor monitoring kit   1 each, Does not apply, As Needed, Take fasting and 2 hours after start of  breakfast, lunch, dinner      hydrOXYzine 25 MG tablet  Commonly known as: ATARAX   25 mg, Oral, 3 Times Daily PRN      Lancet Device misc   Take blood sugar fasting and 2 hours after breakfast, lunch, dinner      NIFEdipine XL 60 MG 24 hr tablet  Commonly known as: PROCARDIA XL   60 mg, Oral, 2 Times Daily      prenatal vitamin 27-0.8 27-0.8 MG tablet tablet   Oral, Daily      Synthroid 50 MCG tablet  Generic drug: levothyroxine   75 mcg, Oral, Daily      valACYclovir 1000 MG tablet  Commonly known as: Valtrex   Take 2000mg BID x 1 day PRN symptoms      vitamin B-12 1000 MCG tablet  Commonly known as: CYANOCOBALAMIN   1,000 mcg, Oral, Daily      VITAMIN D PO   Oral      vitamin E 1000 UNIT capsule   1,000 Units, Oral, Daily             Stop These Medications      aspirin 81 MG EC tablet     glucose blood test strip     Lantus SoloStar 100 UNIT/ML injection pen  Generic drug: Insulin Glargine              Discharge Diet: Regular    Activity at Discharge: Pelvic rest X6 weeks    Education: Warning signs and symptoms given, no tub baths, nothing in the vagina for 6 weeks, no driving for 2 weeks or while talking narcotics     Follow-up Appointments  No future appointments.  Additional Instructions for the Follow-ups that You Need to Schedule       Discharge Follow-up with Specified Provider: Elbert; 1 Week   As directed      To: Elbert   Follow Up: 1 Week   Follow Up Details: BP check                Test Results Pending at Discharge  Pending Labs       Order Current Status    Tissue Pathology Exam Collected (08/28/24 2049)    Tissue Pathology Exam In process             KERA Vuong  08/30/24  09:11 EDT    Time: 09:11 EDT

## 2024-09-04 ENCOUNTER — POSTPARTUM VISIT (OUTPATIENT)
Dept: OBSTETRICS AND GYNECOLOGY | Facility: CLINIC | Age: 34
End: 2024-09-04
Payer: OTHER GOVERNMENT

## 2024-09-04 VITALS
BODY MASS INDEX: 34.93 KG/M2 | SYSTOLIC BLOOD PRESSURE: 126 MMHG | WEIGHT: 223 LBS | DIASTOLIC BLOOD PRESSURE: 86 MMHG | HEART RATE: 109 BPM

## 2024-09-04 DIAGNOSIS — O24.419 GDM, CLASS A2: ICD-10-CM

## 2024-09-04 DIAGNOSIS — O11.9 CHRONIC HYPERTENSION WITH SUPERIMPOSED PREECLAMPSIA: ICD-10-CM

## 2024-09-04 NOTE — PROGRESS NOTES
Chief Complaint   Patient presents with    Postpartum Care     Bp check         SUBJECTIVE:   Callie Parks is a 33 y.o.  who presents s/p  Repeat Low Transverse  Section and bilateral salpingectomy on 24 due to superimposed preeclampsia with severe features.  Reports no major issues. BP is well controlled with Procardia 60XL daily. No HA/vision changes/RUQ pain  Bowel and bladder function have returned to normal  Bleeding scant  Mood changes none  Breastfeeding    OB History    Para Term  AB Living   3 3 2 1 0 3   SAB IAB Ectopic Molar Multiple Live Births   0 0 0 0 0 3      # Outcome Date GA Lbr Supa/2nd Weight Sex Type Anes PTL Lv   3 Term 24 37w0d  3410 g (7 lb 8.3 oz) M CS-LTranv Spinal N JANELLE      Birth Comments: LDOR1 panda   2  21 36w0d  2900 g (6 lb 6.3 oz) M CS-LTranv Spinal Y JANELLE   1 Term 12/15/18 37w2d  2740 g (6 lb 0.7 oz) F CS-LTranv EPI N JANELLE      Birth Comments: panda 1       Complications: Failure to Progress in First Stage        Past Medical History:   Diagnosis Date    Circadian rhythm sleep disorder, shift work type 2017    Elevated liver enzymes     Gestational diabetes     Gestational hypertension     Hypertension     CHTN    Hypothyroidism      Past Surgical History:   Procedure Laterality Date    ADENOIDECTOMY       SECTION N/A 12/15/2018    Procedure:  SECTION PRIMARY;  Surgeon: Felicia Boswell MD;  [place] [facility] [place] [facility];  Service: Obstetrics/Gynecology     SECTION N/A 2024    Procedure:  SECTION REPEAT;  Surgeon: Jose Elias Cooley MD;  [place] [facility] [place] [facility];  Service: Obstetrics/Gynecology;  Laterality: N/A;    WISDOM TOOTH EXTRACTION       Social History     Tobacco Use    Smoking status: Never    Smokeless tobacco: Never   Vaping Use    Vaping status: Never Used   Substance Use Topics    Alcohol use: No    Drug use: No     Family History   Problem Relation Age of Onset     Deep vein thrombosis Mother 45    Pulmonary embolism Mother 45    Deep vein thrombosis Maternal Grandfather         30's    Breast cancer Neg Hx     Ovarian cancer Neg Hx     Uterine cancer Neg Hx     Colon cancer Neg Hx        Patient Active Problem List   Diagnosis    Supervision of high risk pregnancy, unspecified, unspecified trimester    Chronic hypertension affecting pregnancy    Hypothyroid in pregnancy, antepartum, third trimester    Transaminitis    Abnormal glucose affecting pregnancy    Hypertension complicating pregnancy    Preeclampsia    Atonic postpartum hemorrhage    Acute kidney failure, unspecified    Status post  delivery    Pleural effusion, fetal, affecting care of mother, antepartum    History of pre-eclampsia    Pregnancy    Maternal chronic hypertension    S/P  section    Chronic hypertension    Gestational diabetes mellitus (GDM), postpartum        OBJECTIVE:   /86   Pulse 109   Wt 101 kg (223 lb)   LMP 2023 (Approximate)   BMI 34.93 kg/m²    Physical Examination:   General appearance - alert, well appearing, and in no distress  Breasts - deferred  Chest - no tachypnea, retractions or cyanosis  Heart - normal rate and regular rhythm  Abdomen - soft, nontender, nondistended, no masses or organomegaly; Incision healing well, no drainage, no erythema, no hernia, no seroma, no swelling, well approximated, steri strips in place  Pelvic - deferred  Neurological - screening mental status exam normal  Musculoskeletal - no joint tenderness, deformity or swelling  Psychiatric - normal mood and affect    Lab Results   Component Value Date    WBC 10.57 2024    HGB 10.7 (L) 2024    HCT 31.3 (L) 2024    MCV 90.7 2024     (L) 2024      Placental and tubal pathology reviewed    ASSESSMENT/PLAN:  (Z39.2) Postpartum care and examination    (O11.9) Chronic hypertension with superimposed preeclampsia    (O24.419) GDM, class A2    Doing well  postpartum. Will remove steri strips  Baby doing well  Contraception s/p tubal ligation, reviewed pathology  Reviewed placental pathology and implications (myometrial fibers, possibility of accreta).  Bleeding precautions reviewed  At this time, continue pelvic rest and lifting precautions.  Reviewed last pap smear Dec 2023 NIL, HPV negative  Preeclampsia precautions reviewed  GDM- plan for A1C

## 2024-10-18 ENCOUNTER — POSTPARTUM VISIT (OUTPATIENT)
Dept: OBSTETRICS AND GYNECOLOGY | Facility: CLINIC | Age: 34
End: 2024-10-18
Payer: OTHER GOVERNMENT

## 2024-10-18 VITALS
BODY MASS INDEX: 33.59 KG/M2 | WEIGHT: 214 LBS | DIASTOLIC BLOOD PRESSURE: 93 MMHG | HEIGHT: 67 IN | SYSTOLIC BLOOD PRESSURE: 144 MMHG

## 2024-10-18 DIAGNOSIS — O24.419 GDM, CLASS A2: ICD-10-CM

## 2024-10-18 DIAGNOSIS — O11.9 CHRONIC HYPERTENSION WITH SUPERIMPOSED PREECLAMPSIA: ICD-10-CM

## 2024-10-18 NOTE — PROGRESS NOTES
Chief Complaint   Patient presents with    Postpartum Care        SUBJECTIVE:   Callie Parks is a 34 y.o.  who presents s/p  Repeat Low Transverse  Section and bilateral salpingectomy on 24 due to superimposed preeclampsia with severe features.  Reports no major issues. BP is well controlled with Procardia 60XL daily. No HA/vision changes/RUQ pain  Bowel and bladder function have returned to normal  Bleeding scant  Mood changes none  Breastfeeding, EPDS 2    OB History    Para Term  AB Living   3 3 2 1 0 3   SAB IAB Ectopic Molar Multiple Live Births   0 0 0 0 0 3      # Outcome Date GA Lbr Supa/2nd Weight Sex Type Anes PTL Lv   3 Term 24 37w0d  3410 g (7 lb 8.3 oz) M CS-LTranv Spinal N JANELLE      Birth Comments: LDOR1 panda   2  21 36w0d  2900 g (6 lb 6.3 oz) M CS-LTranv Spinal Y JANELLE   1 Term 12/15/18 37w2d  2740 g (6 lb 0.7 oz) F CS-LTranv EPI N JANELLE      Birth Comments: panda 1       Complications: Failure to Progress in First Stage        Past Medical History:   Diagnosis Date    Circadian rhythm sleep disorder, shift work type 2017    Elevated liver enzymes     Gestational diabetes     Gestational hypertension     Hypertension     CHTN    Hypothyroidism      Past Surgical History:   Procedure Laterality Date    ADENOIDECTOMY       SECTION N/A 12/15/2018    Procedure:  SECTION PRIMARY;  Surgeon: Felicia Boswell MD;  Location: [facility];  Service: Obstetrics/Gynecology     SECTION N/A 2024    Procedure:  SECTION REPEAT;  Surgeon: Jose Elias Cooley MD;  Location: [facility] DELIV[facility];  Service: Obstetrics/Gynecology;  Laterality: N/A;    WISDOM TOOTH EXTRACTION       Social History     Tobacco Use    Smoking status: Never    Smokeless tobacco: Never   Vaping Use    Vaping status: Never Used   Substance Use Topics    Alcohol use: No    Drug use: No     Family History   Problem Relation Age of Onset    Deep  "vein thrombosis Mother 45    Pulmonary embolism Mother 45    Deep vein thrombosis Maternal Grandfather         30's    Breast cancer Neg Hx     Ovarian cancer Neg Hx     Uterine cancer Neg Hx     Colon cancer Neg Hx        Patient Active Problem List   Diagnosis    Supervision of high risk pregnancy, unspecified, unspecified trimester    Chronic hypertension affecting pregnancy    Hypothyroid in pregnancy, antepartum, third trimester    Transaminitis    Abnormal glucose affecting pregnancy    Hypertension complicating pregnancy    Preeclampsia    Atonic postpartum hemorrhage    Acute kidney failure, unspecified    Status post  delivery    Pleural effusion, fetal, affecting care of mother, antepartum    History of pre-eclampsia    Pregnancy    Maternal chronic hypertension    S/P  section    Chronic hypertension    Gestational diabetes mellitus (GDM), postpartum        OBJECTIVE:   /93   Ht 170.2 cm (67.01\")   Wt 97.1 kg (214 lb)   BMI 33.51 kg/m²    Physical Examination:   General appearance - alert, well appearing, and in no distress  Breasts - deferred  Chest - no tachypnea, retractions or cyanosis  Heart - normal rate and regular rhythm  Abdomen - soft, nontender, nondistended, no masses or organomegaly; Incision healing well, no drainage, no erythema, no hernia, no seroma, no swelling, well approximated,   Pelvic - deferred  Neurological - screening mental status exam normal  Musculoskeletal - no joint tenderness, deformity or swelling  Psychiatric - normal mood and affect    Lab Results   Component Value Date    WBC 10.57 2024    HGB 10.7 (L) 2024    HCT 31.3 (L) 2024    MCV 90.7 2024     (L) 2024          ASSESSMENT/PLAN:  (Z39.2) Postpartum care and examination    (O11.9) Chronic hypertension with superimposed preeclampsia    (O24.419) GDM, class A2    Doing well postpartum.   Baby doing well  Contraception s/p tubal ligation,  Bleeding has " improved. Had heavy menses before pregnancy. Will call if menses are heavy  At this time, continue pelvic rest and lifting precautions.  Reviewed last pap smear Dec 2023 NIL, HPV negative  GDM- plan for A1C - plans to get in November  Plans to see PCP for management of CHTN

## 2024-12-05 ENCOUNTER — PATIENT MESSAGE (OUTPATIENT)
Dept: OBSTETRICS AND GYNECOLOGY | Facility: CLINIC | Age: 34
End: 2024-12-05
Payer: OTHER GOVERNMENT

## 2024-12-06 RX ORDER — CEPHALEXIN 500 MG/1
500 CAPSULE ORAL 4 TIMES DAILY
Qty: 40 CAPSULE | Refills: 0 | Status: SHIPPED | OUTPATIENT
Start: 2024-12-06 | End: 2024-12-16

## 2024-12-23 ENCOUNTER — LAB (OUTPATIENT)
Dept: LAB | Facility: HOSPITAL | Age: 34
End: 2024-12-23
Payer: OTHER GOVERNMENT

## 2024-12-23 DIAGNOSIS — O10.919: Primary | ICD-10-CM

## 2024-12-23 LAB
ALBUMIN SERPL-MCNC: 4.4 G/DL (ref 3.5–5.2)
ALBUMIN/GLOB SERPL: 1.4 G/DL
ALP SERPL-CCNC: 119 U/L (ref 39–117)
ALT SERPL W P-5'-P-CCNC: 71 U/L (ref 1–33)
ANION GAP SERPL CALCULATED.3IONS-SCNC: 11.3 MMOL/L (ref 5–15)
AST SERPL-CCNC: 40 U/L (ref 1–32)
BILIRUB SERPL-MCNC: 0.4 MG/DL (ref 0–1.2)
BUN SERPL-MCNC: 17 MG/DL (ref 6–20)
BUN/CREAT SERPL: 25.8 (ref 7–25)
CALCIUM SPEC-SCNC: 9.8 MG/DL (ref 8.6–10.5)
CHLORIDE SERPL-SCNC: 102 MMOL/L (ref 98–107)
CO2 SERPL-SCNC: 26.7 MMOL/L (ref 22–29)
CREAT SERPL-MCNC: 0.66 MG/DL (ref 0.57–1)
EGFRCR SERPLBLD CKD-EPI 2021: 119.7 ML/MIN/1.73
GLOBULIN UR ELPH-MCNC: 3.2 GM/DL
GLUCOSE SERPL-MCNC: 108 MG/DL (ref 65–99)
POTASSIUM SERPL-SCNC: 4.1 MMOL/L (ref 3.5–5.2)
PROT SERPL-MCNC: 7.6 G/DL (ref 6–8.5)
SODIUM SERPL-SCNC: 140 MMOL/L (ref 136–145)

## 2024-12-23 PROCEDURE — 36415 COLL VENOUS BLD VENIPUNCTURE: CPT

## 2024-12-23 PROCEDURE — 80053 COMPREHEN METABOLIC PANEL: CPT

## 2024-12-26 ENCOUNTER — LAB (OUTPATIENT)
Dept: LAB | Facility: HOSPITAL | Age: 34
End: 2024-12-26
Payer: OTHER GOVERNMENT

## 2024-12-26 DIAGNOSIS — O24.419 GDM, CLASS A2: Primary | ICD-10-CM

## 2024-12-26 LAB — HBA1C MFR BLD: 5.2 % (ref 4.8–5.6)

## 2024-12-26 PROCEDURE — 36415 COLL VENOUS BLD VENIPUNCTURE: CPT | Performed by: OBSTETRICS & GYNECOLOGY

## 2024-12-26 PROCEDURE — 83036 HEMOGLOBIN GLYCOSYLATED A1C: CPT | Performed by: OBSTETRICS & GYNECOLOGY

## (undated) DEVICE — SUT MNCRYL 3/0 CT1 36 IN Y944H

## (undated) DEVICE — GLV SURG BIOGEL LTX PF 7 1/2

## (undated) DEVICE — ENSEAL X1 STRAIGHT 25CM SHAFT: Brand: HARMONIC

## (undated) DEVICE — SUT VIC 0 CT1 36IN J946H

## (undated) DEVICE — KENDALL SCD EXPRESS SLEEVES, KNEE LENGTH, MEDIUM: Brand: KENDALL SCD

## (undated) DEVICE — SUT MNCRYL 0/0 CTX 36IN Y398H

## (undated) DEVICE — GLV SURG BIOGEL LTX PF 6

## (undated) DEVICE — SOL IRR NACL 0.9PCT BT 1000ML

## (undated) DEVICE — SUT VIC 3/0 CTI 36IN J944H

## (undated) DEVICE — ANTIBACTERIAL UNDYED BRAIDED (POLYGLACTIN 910), SYNTHETIC ABSORBABLE SUTURE: Brand: COATED VICRYL

## (undated) DEVICE — SUT PLAIN  3/0 CT1 27IN 842H

## (undated) DEVICE — SOL IRR H2O BTL 1000ML STRL

## (undated) DEVICE — SUT MNCRYL 3/0 27L Y523H BX/36

## (undated) DEVICE — 3M(TM) TEGADERM(TM) TRANSPARENT FILM DRESSING FRAME STYLE 1627: Brand: 3M™ TEGADERM™